# Patient Record
Sex: FEMALE | Race: BLACK OR AFRICAN AMERICAN | NOT HISPANIC OR LATINO | URBAN - METROPOLITAN AREA
[De-identification: names, ages, dates, MRNs, and addresses within clinical notes are randomized per-mention and may not be internally consistent; named-entity substitution may affect disease eponyms.]

---

## 2018-11-14 ENCOUNTER — EMERGENCY (EMERGENCY)
Facility: HOSPITAL | Age: 53
LOS: 1 days | Discharge: HOME | End: 2018-11-14
Attending: EMERGENCY MEDICINE
Payer: SELF-PAY

## 2018-11-14 VITALS
OXYGEN SATURATION: 100 % | TEMPERATURE: 98 F | SYSTOLIC BLOOD PRESSURE: 137 MMHG | RESPIRATION RATE: 17 BRPM | DIASTOLIC BLOOD PRESSURE: 83 MMHG | HEART RATE: 81 BPM

## 2018-11-14 VITALS
SYSTOLIC BLOOD PRESSURE: 128 MMHG | OXYGEN SATURATION: 100 % | RESPIRATION RATE: 20 BRPM | WEIGHT: 201.06 LBS | DIASTOLIC BLOOD PRESSURE: 78 MMHG | HEART RATE: 72 BPM | TEMPERATURE: 98 F

## 2018-11-14 PROCEDURE — 73610 X-RAY EXAM OF ANKLE: CPT | Mod: 26,RT

## 2018-11-14 PROCEDURE — 99283 EMERGENCY DEPT VISIT LOW MDM: CPT

## 2018-11-14 PROCEDURE — 73610 X-RAY EXAM OF ANKLE: CPT

## 2018-11-14 RX ORDER — IBUPROFEN 200 MG
600 TABLET ORAL ONCE
Qty: 0 | Refills: 0 | Status: COMPLETED | OUTPATIENT
Start: 2018-11-14 | End: 2018-11-14

## 2018-11-14 RX ADMIN — Medication 600 MILLIGRAM(S): at 17:17

## 2018-11-14 NOTE — ED PROVIDER NOTE - SKIN, MLM
Skin normal color for race, warm, dry and intact. No evidence of rash. Edema over right medial ankle

## 2018-11-14 NOTE — ED PROVIDER NOTE - CARE PLAN
Principal Discharge DX:	Ankle sprain  Assessment and plan of treatment:	You were seen in the Emergency Department for ankle pain. Your examination and xrays were reassuring. Use the brace and crutches as discussed. Please follow up with orthopedic surgery as soon as possible for further evaluation. Take motrin and tylenol as needed. Please return to the Emergency Department if you have any new concerning symptoms such as severe pain, weakness, or any other concerning symptoms. Principal Discharge DX:	Ankle sprain  Goal:	R lateral  Assessment and plan of treatment:	You were seen in the Emergency Department for ankle pain. Your examination and xrays were reassuring. Use the brace and crutches as discussed. Please follow up with orthopedic surgery as soon as possible for further evaluation. Take motrin and tylenol as needed. Please return to the Emergency Department if you have any new concerning symptoms such as severe pain, weakness, or any other concerning symptoms.

## 2018-11-14 NOTE — ED PROVIDER NOTE - PLAN OF CARE
You were seen in the Emergency Department for ankle pain. Your examination and xrays were reassuring. Use the brace and crutches as discussed. Please follow up with orthopedic surgery as soon as possible for further evaluation. Take motrin and tylenol as needed. Please return to the Emergency Department if you have any new concerning symptoms such as severe pain, weakness, or any other concerning symptoms. R lateral

## 2018-11-14 NOTE — ED ADULT NURSE NOTE - NSIMPLEMENTINTERV_GEN_ALL_ED
Implemented All Universal Safety Interventions:  [place] to call system. Call bell, personal items and telephone within reach. Instruct patient to call for assistance. Room bathroom lighting operational. Non-slip footwear when patient is off stretcher. Physically safe environment: no spills, clutter or unnecessary equipment. Stretcher in lowest position, wheels locked, appropriate side rails in place.
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
## 2018-11-14 NOTE — ED PROVIDER NOTE - MUSCULOSKELETAL MINIMAL EXAM
tenderness to palpation right posterior lateral malleolus, ATFL, no tenderness over base of 5th, midfoot, medial ankle
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
## 2020-06-16 ENCOUNTER — TRANSCRIPTION ENCOUNTER (OUTPATIENT)
Age: 55
End: 2020-06-16

## 2023-07-25 ENCOUNTER — INPATIENT (INPATIENT)
Facility: HOSPITAL | Age: 58
LOS: 9 days | Discharge: HOME | End: 2023-08-04
Attending: INTERNAL MEDICINE | Admitting: INTERNAL MEDICINE
Payer: COMMERCIAL

## 2023-07-25 VITALS
TEMPERATURE: 98 F | SYSTOLIC BLOOD PRESSURE: 103 MMHG | OXYGEN SATURATION: 98 % | RESPIRATION RATE: 18 BRPM | DIASTOLIC BLOOD PRESSURE: 66 MMHG | HEART RATE: 90 BPM

## 2023-07-25 LAB
ALBUMIN SERPL ELPH-MCNC: 5 G/DL — SIGNIFICANT CHANGE UP (ref 3.3–5)
ALP SERPL-CCNC: 88 U/L — SIGNIFICANT CHANGE UP (ref 40–120)
ALT FLD-CCNC: 12 U/L — SIGNIFICANT CHANGE UP (ref 4–33)
ANION GAP SERPL CALC-SCNC: 13 MMOL/L — SIGNIFICANT CHANGE UP (ref 7–14)
APPEARANCE UR: ABNORMAL
APTT BLD: 22.1 SEC — LOW (ref 24.5–35.6)
AST SERPL-CCNC: 14 U/L — SIGNIFICANT CHANGE UP (ref 4–32)
B PERT DNA SPEC QL NAA+PROBE: SIGNIFICANT CHANGE UP
B PERT+PARAPERT DNA PNL SPEC NAA+PROBE: SIGNIFICANT CHANGE UP
BACTERIA # UR AUTO: ABNORMAL /HPF
BASE EXCESS BLDV CALC-SCNC: 1.1 MMOL/L — SIGNIFICANT CHANGE UP (ref -2–3)
BASOPHILS # BLD AUTO: 0.03 K/UL — SIGNIFICANT CHANGE UP (ref 0–0.2)
BASOPHILS NFR BLD AUTO: 0.2 % — SIGNIFICANT CHANGE UP (ref 0–2)
BILIRUB SERPL-MCNC: 0.5 MG/DL — SIGNIFICANT CHANGE UP (ref 0.2–1.2)
BILIRUB UR-MCNC: ABNORMAL
BLOOD GAS VENOUS COMPREHENSIVE RESULT: SIGNIFICANT CHANGE UP
BORDETELLA PARAPERTUSSIS (RAPRVP): SIGNIFICANT CHANGE UP
BUN SERPL-MCNC: 20 MG/DL — SIGNIFICANT CHANGE UP (ref 7–23)
C PNEUM DNA SPEC QL NAA+PROBE: SIGNIFICANT CHANGE UP
CALCIUM SERPL-MCNC: 10.6 MG/DL — HIGH (ref 8.4–10.5)
CAST: 5 /LPF — SIGNIFICANT CHANGE UP (ref 0–4)
CHLORIDE BLDV-SCNC: 103 MMOL/L — SIGNIFICANT CHANGE UP (ref 96–108)
CHLORIDE SERPL-SCNC: 101 MMOL/L — SIGNIFICANT CHANGE UP (ref 98–107)
CO2 BLDV-SCNC: 29.1 MMOL/L — HIGH (ref 22–26)
CO2 SERPL-SCNC: 23 MMOL/L — SIGNIFICANT CHANGE UP (ref 22–31)
COD CRY URNS QL: PRESENT
COLOR SPEC: SIGNIFICANT CHANGE UP
CREAT SERPL-MCNC: 3.81 MG/DL — HIGH (ref 0.5–1.3)
DIFF PNL FLD: ABNORMAL
EGFR: 13 ML/MIN/1.73M2 — LOW
EOSINOPHIL # BLD AUTO: 0 K/UL — SIGNIFICANT CHANGE UP (ref 0–0.5)
EOSINOPHIL NFR BLD AUTO: 0 % — SIGNIFICANT CHANGE UP (ref 0–6)
FLUAV SUBTYP SPEC NAA+PROBE: SIGNIFICANT CHANGE UP
FLUBV RNA SPEC QL NAA+PROBE: SIGNIFICANT CHANGE UP
GAS PNL BLDV: 135 MMOL/L — LOW (ref 136–145)
GLUCOSE BLDV-MCNC: 180 MG/DL — HIGH (ref 70–99)
GLUCOSE SERPL-MCNC: 168 MG/DL — HIGH (ref 70–99)
GLUCOSE UR QL: NEGATIVE MG/DL — SIGNIFICANT CHANGE UP
HADV DNA SPEC QL NAA+PROBE: SIGNIFICANT CHANGE UP
HCO3 BLDV-SCNC: 28 MMOL/L — SIGNIFICANT CHANGE UP (ref 22–29)
HCOV 229E RNA SPEC QL NAA+PROBE: SIGNIFICANT CHANGE UP
HCOV HKU1 RNA SPEC QL NAA+PROBE: SIGNIFICANT CHANGE UP
HCOV NL63 RNA SPEC QL NAA+PROBE: SIGNIFICANT CHANGE UP
HCOV OC43 RNA SPEC QL NAA+PROBE: SIGNIFICANT CHANGE UP
HCT VFR BLD CALC: 44.8 % — SIGNIFICANT CHANGE UP (ref 34.5–45)
HCT VFR BLDA CALC: 44 % — SIGNIFICANT CHANGE UP (ref 34.5–46.5)
HGB BLD CALC-MCNC: 14.5 G/DL — SIGNIFICANT CHANGE UP (ref 11.7–16.1)
HGB BLD-MCNC: 14.1 G/DL — SIGNIFICANT CHANGE UP (ref 11.5–15.5)
HMPV RNA SPEC QL NAA+PROBE: SIGNIFICANT CHANGE UP
HPIV1 RNA SPEC QL NAA+PROBE: SIGNIFICANT CHANGE UP
HPIV2 RNA SPEC QL NAA+PROBE: SIGNIFICANT CHANGE UP
HPIV3 RNA SPEC QL NAA+PROBE: SIGNIFICANT CHANGE UP
HPIV4 RNA SPEC QL NAA+PROBE: SIGNIFICANT CHANGE UP
IANC: 10.65 K/UL — HIGH (ref 1.8–7.4)
IMM GRANULOCYTES NFR BLD AUTO: 2.7 % — HIGH (ref 0–0.9)
INR BLD: 1.1 RATIO — SIGNIFICANT CHANGE UP (ref 0.85–1.18)
KETONES UR-MCNC: 15 MG/DL
LACTATE BLDV-MCNC: 1.9 MMOL/L — SIGNIFICANT CHANGE UP (ref 0.5–2)
LEUKOCYTE ESTERASE UR-ACNC: ABNORMAL
LITHIUM SERPL-MCNC: 5.3 MMOL/L — CRITICAL HIGH (ref 0.6–1.2)
LYMPHOCYTES # BLD AUTO: 1.97 K/UL — SIGNIFICANT CHANGE UP (ref 1–3.3)
LYMPHOCYTES # BLD AUTO: 14 % — SIGNIFICANT CHANGE UP (ref 13–44)
M PNEUMO DNA SPEC QL NAA+PROBE: SIGNIFICANT CHANGE UP
MCHC RBC-ENTMCNC: 30.9 PG — SIGNIFICANT CHANGE UP (ref 27–34)
MCHC RBC-ENTMCNC: 31.5 GM/DL — LOW (ref 32–36)
MCV RBC AUTO: 98 FL — SIGNIFICANT CHANGE UP (ref 80–100)
MONOCYTES # BLD AUTO: 1 K/UL — HIGH (ref 0–0.9)
MONOCYTES NFR BLD AUTO: 7.1 % — SIGNIFICANT CHANGE UP (ref 2–14)
NEUTROPHILS # BLD AUTO: 10.65 K/UL — HIGH (ref 1.8–7.4)
NEUTROPHILS NFR BLD AUTO: 76 % — SIGNIFICANT CHANGE UP (ref 43–77)
NITRITE UR-MCNC: NEGATIVE — SIGNIFICANT CHANGE UP
NRBC # BLD: 0 /100 WBCS — SIGNIFICANT CHANGE UP (ref 0–0)
NRBC # FLD: 0 K/UL — SIGNIFICANT CHANGE UP (ref 0–0)
PCO2 BLDV: 50 MMHG — SIGNIFICANT CHANGE UP (ref 39–52)
PH BLDV: 7.35 — SIGNIFICANT CHANGE UP (ref 7.32–7.43)
PH UR: 5.5 — SIGNIFICANT CHANGE UP (ref 5–8)
PLATELET # BLD AUTO: 368 K/UL — SIGNIFICANT CHANGE UP (ref 150–400)
PO2 BLDV: 29 MMHG — SIGNIFICANT CHANGE UP (ref 25–45)
POTASSIUM BLDV-SCNC: 3.5 MMOL/L — SIGNIFICANT CHANGE UP (ref 3.5–5.1)
POTASSIUM SERPL-MCNC: 3.5 MMOL/L — SIGNIFICANT CHANGE UP (ref 3.5–5.3)
POTASSIUM SERPL-SCNC: 3.5 MMOL/L — SIGNIFICANT CHANGE UP (ref 3.5–5.3)
PROT SERPL-MCNC: 8.2 G/DL — SIGNIFICANT CHANGE UP (ref 6–8.3)
PROT UR-MCNC: 100 MG/DL
PROTHROM AB SERPL-ACNC: 12.3 SEC — SIGNIFICANT CHANGE UP (ref 9.5–13)
RAPID RVP RESULT: SIGNIFICANT CHANGE UP
RBC # BLD: 4.57 M/UL — SIGNIFICANT CHANGE UP (ref 3.8–5.2)
RBC # FLD: 14.7 % — HIGH (ref 10.3–14.5)
RBC CASTS # UR COMP ASSIST: 24 /HPF — HIGH (ref 0–4)
REVIEW: SIGNIFICANT CHANGE UP
RSV RNA SPEC QL NAA+PROBE: SIGNIFICANT CHANGE UP
RV+EV RNA SPEC QL NAA+PROBE: SIGNIFICANT CHANGE UP
SAO2 % BLDV: 55.3 % — LOW (ref 67–88)
SARS-COV-2 RNA SPEC QL NAA+PROBE: SIGNIFICANT CHANGE UP
SODIUM SERPL-SCNC: 137 MMOL/L — SIGNIFICANT CHANGE UP (ref 135–145)
SP GR SPEC: 1.03 — SIGNIFICANT CHANGE UP (ref 1–1.03)
SQUAMOUS # UR AUTO: 44 /HPF — HIGH (ref 0–5)
UROBILINOGEN FLD QL: 1 MG/DL — SIGNIFICANT CHANGE UP (ref 0.2–1)
WBC # BLD: 14.03 K/UL — HIGH (ref 3.8–10.5)
WBC # FLD AUTO: 14.03 K/UL — HIGH (ref 3.8–10.5)
WBC UR QL: 62 /HPF — HIGH (ref 0–5)

## 2023-07-25 PROCEDURE — 99285 EMERGENCY DEPT VISIT HI MDM: CPT

## 2023-07-25 PROCEDURE — 70450 CT HEAD/BRAIN W/O DYE: CPT | Mod: 26,MA

## 2023-07-25 PROCEDURE — 71045 X-RAY EXAM CHEST 1 VIEW: CPT | Mod: 26

## 2023-07-25 RX ORDER — SODIUM CHLORIDE 9 MG/ML
1000 INJECTION INTRAMUSCULAR; INTRAVENOUS; SUBCUTANEOUS ONCE
Refills: 0 | Status: COMPLETED | OUTPATIENT
Start: 2023-07-25 | End: 2023-07-25

## 2023-07-25 RX ADMIN — SODIUM CHLORIDE 1000 MILLILITER(S): 9 INJECTION INTRAMUSCULAR; INTRAVENOUS; SUBCUTANEOUS at 23:45

## 2023-07-25 RX ADMIN — SODIUM CHLORIDE 1000 MILLILITER(S): 9 INJECTION INTRAMUSCULAR; INTRAVENOUS; SUBCUTANEOUS at 23:56

## 2023-07-25 RX ADMIN — SODIUM CHLORIDE 1000 MILLILITER(S): 9 INJECTION INTRAMUSCULAR; INTRAVENOUS; SUBCUTANEOUS at 17:15

## 2023-07-25 NOTE — ED ADULT NURSE NOTE - CHIEF COMPLAINT QUOTE
Pt arrives in wheelchair to triage from [facility] outpatient office, sent by NP for N/V since Sunday. Pt appears lethargic in triage, minimally verbal. Pt hasn't been able to take meds since Sunday. PMHx: Bipolar disorder 1.

## 2023-07-25 NOTE — ED PROVIDER NOTE - PHYSICAL EXAMINATION
Physical Exam:  Gen: NAD, AO2  Head: NCAT  HEENT: EOMI, PEERLA, normal conjunctiva, tongue midline, oral mucosa moist  Lung: CTAB, no respiratory distress, no wheezes/rhonchi/rales B/L, speaking in full sentences  CV: RRR  Abd: soft, NT, ND, no guarding, no rigidity, no rebound tenderness, no CVA tenderness   MSK: no visible deformities, ROM normal in UE/LE, no back pain  Neuro: No focal sensory or motor deficits  Skin: Warm, well perfused, no rash, no leg swelling  Psych: normal affect, calm

## 2023-07-25 NOTE — ED PROVIDER NOTE - OBJECTIVE STATEMENT
Patient is a 58-year-old female with a past medical history of bipolar on lithium who presents emergency department for 1 week of lethargy and fatigue..  Per patient's daughter who is bedside, the patient has not been responsive or talking to her since earlier today.  Patient went to outpatient [facility] and was referred here to get evaluated.  Per patient's family member, the the patient has not taken her medication since Sunday due to nausea, vomiting.  Patient's family member states she has been nauseous and having multiple episodes of diarrhea for the last week.  Patient has not had a p.o. intake in over 24 hours.  Patient denies any other recent illnesses.

## 2023-07-25 NOTE — ED ADULT NURSE NOTE - OBJECTIVE STATEMENT
58 yr old female aox2 pmh bipolar 1, referred from [facility] clinic for evaluation of worsening lethargy. daughter present at bedside states pt has been under a lot of stress lately and began to enter a "manic" phase not being able to eat or sleep more than 30 mins at a time since sunday. when awake pt states she has been having N/V/D since yesterday. denies fever, chills, urinary sx, syncope, falls, sick contact/ travel. pt unable to stay awake for more than 2 mins at a time during assessment. Lungs clear, +BSX4 soft nontender, +radial pulse equal BL, no injuries noted, placed on cardiac monitor, labs sent, NS bolus infusing, in no acute distress

## 2023-07-25 NOTE — ED PROVIDER NOTE - CLINICAL SUMMARY MEDICAL DECISION MAKING FREE TEXT BOX
Patient presents emergency department complaining of lethargy and fatigue.  Patient is hemodynamically stable and afebrile on presentation.  However patient altered and able to.  This is a change from her baseline we will obtain infectious etiology work-up.  We will also obtain CT head to evaluate.  Pending labs and imaging for further disposition. Patient presents emergency department complaining of lethargy and fatigue.  Patient is hemodynamically stable and afebrile on presentation.  However patient altered and able to.  This is a change from her baseline we will obtain infectious etiology work-up.  We will also obtain CT head to evaluate.  Pending labs and imaging for further disposition.    saeed:Patient is a 58-year-old woman who is being assessed at [facility] outpatient was found to be lethargic so the patient was sent to the emergency department.  She has had nausea and vomiting the last 3 days..  Here patient is mildly lethargic.  As per the daughter she does not believe he has been able to take her medication over the last couple days.  She is supposed to take lithium.  Abdomen is soft no rebound no guarding patient is mildly lethargic vital signs are reasonable although a temp of 99.2    Labs reveal a white count of 14,000 a creatinine which is a new bump of 3.81 with a glucose of 168 calcium of 10.6 the creatinine is of a significant bump up from May 2023 when it was normal BNP is 366 her urine reveals moderate leuk esterase but negative nitrates with red cells of 62 and white cells of 60 to with 44 epithelial cells her RVP is negative chest x-ray does not reveal any pneumonia we await a lithium and the CT of her head

## 2023-07-25 NOTE — ED ADULT NURSE NOTE - NSFALLRISKINTERV_ED_ALL_ED
Assistance OOB with selected safe patient handling equipment if applicable/Assistance with ambulation/Communicate fall risk and risk factors to all staff, patient, and family/Monitor gait and stability/Monitor for mental status changes and reorient to person, place, and time, as needed/Move patient closer to nursing station/within visual sight of ED staff/Provide visual cue: yellow wristband, yellow gown, etc/Reinforce activity limits and safety measures with patient and family/Toileting schedule using arm’s reach rule for commode and bathroom/Use of alarms - bed, stretcher, chair and/or video monitoring/Call bell, personal items and telephone in reach/Instruct patient to call for assistance before getting out of bed/chair/stretcher/Non-slip footwear applied when patient is off stretcher/[place] to call system/Physically safe environment - no spills, clutter or unnecessary equipment/Purposeful Proactive Rounding/Room/bathroom lighting operational, light cord in reach

## 2023-07-25 NOTE — ED PROVIDER NOTE - PROGRESS NOTE DETAILS
Drew Lynn MD (PGY3): Lithium level 5, creatinine elevated, baseline ~1. c/f acute on chronic lihtium tox. will give extra 2L NS bolus. will place sierra. tox paged. Drew Lynn MD (PGY3): tox and nephrology conulted. will set up for shiley placement. micu to be consulted. Drew Lynn MD (PGY3): micu conulted, aware Drew Lynn MD (PGY3): patient admitted to MICU.

## 2023-07-25 NOTE — ED PROVIDER NOTE - ATTENDING CONTRIBUTION TO CARE
saeed:Patient is a 58-year-old woman who is being assessed at [facility] outpatient was found to be lethargic so the patient was sent to the emergency department.  She has had nausea and vomiting the last 3 days..  Here patient is mildly lethargic.  As per the daughter she does not believe he has been able to take her medication over the last couple days.  She is supposed to take lithium.  Abdomen is soft no rebound no guarding patient is mildly lethargic vital signs are reasonable although a temp of 99.2    Labs reveal a white count of 14,000 a creatinine which is a new bump of 3.81 with a glucose of 168 calcium of 10.6 the creatinine is of a significant bump up from May 2023 when it was normal BNP is 366 her urine reveals moderate leuk esterase but negative nitrates with red cells of 62 and white cells of 60 to with 44 epithelial cells her RVP is negative chest x-ray does not reveal any pneumonia we await a lithium and the CT of her head    I performed a history and physical exam of the patient and discussed their management with the resident and /or advanced care provider. I reviewed the resident and /or ACP's note and agree with the documented findings and plan of care. My medical decison making and observations are found above.

## 2023-07-25 NOTE — ED ADULT NURSE REASSESSMENT NOTE - NS ED NURSE REASSESS COMMENT FT1
Pt admitted to MICU. MD Jean-Baptiste at pt bedside to place shiley for HD. Pt A&Ox2, alert but minimal responses. Pt able to follow some commands at this time, repeats name, unsure of location, lifts upper extremities. Pt b/l upper extremities sensory/motor intact, complete ROM. Right 20g placed, additional labs collected and sent. Pt pending transfer to ICU. Safety maintained.

## 2023-07-25 NOTE — ED ADULT NURSE REASSESSMENT NOTE - NS ED NURSE REASSESS COMMENT FT1
Received report from kareem Manrique. Pt A&Ox4, ambulatory at baseline, on room air coming to ER for lip laceration. Pt upper lip swollen, painful. Airway patent, pt speaking in full sentences, no drooling, no s/s of respiratory distress. Pt currently denies chest pain, HA, SOB, dizziness, vision changes. NSR on cardiac monitor. Respirations even and unlabored, chest rise and fall equal b/l. Abdomen soft and nontender. Right 20g patent. Pt pending CT. Safety maintained. Received report from kareem Manrique. Pt A&Ox4, ambulatory at baseline, on room air coming to ER for N/V/D, poor PO intake. Pt history of bipolar. Pt not responding to questions. Pt arouses when spoken to. Pt  with no s/s of distress, respiratory distress, pain. NSR on cardiac monitor. Respirations even and unlabored, chest rise and fall equal b/l. Abdomen soft and nontender. Right 20g patent. Pt pending CT. Safety maintained.

## 2023-07-25 NOTE — ED ADULT TRIAGE NOTE - CHIEF COMPLAINT QUOTE
Pt arrives in wheelchair to triage from [facility] outpatient office, sent by NP for N/V since Sunday. Pt appears lethargic in triage, minimally verbal. Pt hasn't been able to take meds since Sunday. PMHx: Bipolar disorder 1.

## 2023-07-26 DIAGNOSIS — T56.891A TOXIC EFFECT OF OTHER METALS, ACCIDENTAL (UNINTENTIONAL), INITIAL ENCOUNTER: ICD-10-CM

## 2023-07-26 DIAGNOSIS — N17.9 ACUTE KIDNEY FAILURE, UNSPECIFIED: ICD-10-CM

## 2023-07-26 LAB
AMMONIA BLD-MCNC: 23 UMOL/L — SIGNIFICANT CHANGE UP (ref 11–55)
AMPHET UR-MCNC: NEGATIVE — SIGNIFICANT CHANGE UP
ANION GAP SERPL CALC-SCNC: 10 MMOL/L — SIGNIFICANT CHANGE UP (ref 7–14)
ANION GAP SERPL CALC-SCNC: 11 MMOL/L — SIGNIFICANT CHANGE UP (ref 7–14)
ANION GAP SERPL CALC-SCNC: 11 MMOL/L — SIGNIFICANT CHANGE UP (ref 7–14)
ANION GAP SERPL CALC-SCNC: 12 MMOL/L — SIGNIFICANT CHANGE UP (ref 7–14)
APAP SERPL-MCNC: <10 UG/ML — LOW (ref 15–25)
APPEARANCE UR: ABNORMAL
BACTERIA # UR AUTO: ABNORMAL /HPF
BARBITURATES UR SCN-MCNC: NEGATIVE — SIGNIFICANT CHANGE UP
BENZODIAZ UR-MCNC: NEGATIVE — SIGNIFICANT CHANGE UP
BILIRUB UR-MCNC: NEGATIVE — SIGNIFICANT CHANGE UP
BUN SERPL-MCNC: 17 MG/DL — SIGNIFICANT CHANGE UP (ref 7–23)
BUN SERPL-MCNC: 18 MG/DL — SIGNIFICANT CHANGE UP (ref 7–23)
BUN SERPL-MCNC: 22 MG/DL — SIGNIFICANT CHANGE UP (ref 7–23)
BUN SERPL-MCNC: 25 MG/DL — HIGH (ref 7–23)
CALCIUM SERPL-MCNC: 8.3 MG/DL — LOW (ref 8.4–10.5)
CALCIUM SERPL-MCNC: 8.4 MG/DL — SIGNIFICANT CHANGE UP (ref 8.4–10.5)
CALCIUM SERPL-MCNC: 8.6 MG/DL — SIGNIFICANT CHANGE UP (ref 8.4–10.5)
CALCIUM SERPL-MCNC: 9 MG/DL — SIGNIFICANT CHANGE UP (ref 8.4–10.5)
CAST: 178 /LPF — HIGH (ref 0–4)
CHLORIDE SERPL-SCNC: 105 MMOL/L — SIGNIFICANT CHANGE UP (ref 98–107)
CHLORIDE SERPL-SCNC: 107 MMOL/L — SIGNIFICANT CHANGE UP (ref 98–107)
CHLORIDE SERPL-SCNC: 107 MMOL/L — SIGNIFICANT CHANGE UP (ref 98–107)
CHLORIDE SERPL-SCNC: 108 MMOL/L — HIGH (ref 98–107)
CO2 SERPL-SCNC: 19 MMOL/L — LOW (ref 22–31)
CO2 SERPL-SCNC: 23 MMOL/L — SIGNIFICANT CHANGE UP (ref 22–31)
CO2 SERPL-SCNC: 23 MMOL/L — SIGNIFICANT CHANGE UP (ref 22–31)
CO2 SERPL-SCNC: 24 MMOL/L — SIGNIFICANT CHANGE UP (ref 22–31)
COCAINE METAB.OTHER UR-MCNC: NEGATIVE — SIGNIFICANT CHANGE UP
COLOR SPEC: SIGNIFICANT CHANGE UP
CREAT SERPL-MCNC: 2.09 MG/DL — HIGH (ref 0.5–1.3)
CREAT SERPL-MCNC: 2.16 MG/DL — HIGH (ref 0.5–1.3)
CREAT SERPL-MCNC: 2.19 MG/DL — HIGH (ref 0.5–1.3)
CREAT SERPL-MCNC: 3.6 MG/DL — HIGH (ref 0.5–1.3)
CREATININE URINE RESULT, DAU: 235 MG/DL — SIGNIFICANT CHANGE UP
DIALYSIS INSTRUMENT RESULT - HEPATITIS B SURFACE ANTIGEN: NEGATIVE — SIGNIFICANT CHANGE UP
DIFF PNL FLD: ABNORMAL
EGFR: 14 ML/MIN/1.73M2 — LOW
EGFR: 25 ML/MIN/1.73M2 — LOW
EGFR: 26 ML/MIN/1.73M2 — LOW
EGFR: 27 ML/MIN/1.73M2 — LOW
GLUCOSE SERPL-MCNC: 102 MG/DL — HIGH (ref 70–99)
GLUCOSE SERPL-MCNC: 113 MG/DL — HIGH (ref 70–99)
GLUCOSE SERPL-MCNC: 114 MG/DL — HIGH (ref 70–99)
GLUCOSE SERPL-MCNC: 116 MG/DL — HIGH (ref 70–99)
GLUCOSE UR QL: NEGATIVE MG/DL — SIGNIFICANT CHANGE UP
HBV CORE AB SER-ACNC: SIGNIFICANT CHANGE UP
HBV CORE IGM SER-ACNC: SIGNIFICANT CHANGE UP
HBV SURFACE AB SER-ACNC: 17.2 MIU/ML — SIGNIFICANT CHANGE UP
HBV SURFACE AG SER-ACNC: SIGNIFICANT CHANGE UP
HYALINE CASTS # UR AUTO: PRESENT
KETONES UR-MCNC: 15 MG/DL
LEUKOCYTE ESTERASE UR-ACNC: ABNORMAL
LITHIUM SERPL-MCNC: 3.2 MMOL/L — CRITICAL HIGH (ref 0.6–1.2)
LITHIUM SERPL-MCNC: 3.5 MMOL/L — CRITICAL HIGH (ref 0.6–1.2)
LITHIUM SERPL-MCNC: 3.5 MMOL/L — CRITICAL HIGH (ref 0.6–1.2)
LITHIUM SERPL-MCNC: 5.2 MMOL/L — CRITICAL HIGH (ref 0.6–1.2)
MAGNESIUM SERPL-MCNC: 1.8 MG/DL — SIGNIFICANT CHANGE UP (ref 1.6–2.6)
MAGNESIUM SERPL-MCNC: 1.8 MG/DL — SIGNIFICANT CHANGE UP (ref 1.6–2.6)
MAGNESIUM SERPL-MCNC: 2.5 MG/DL — SIGNIFICANT CHANGE UP (ref 1.6–2.6)
MAGNESIUM SERPL-MCNC: 3.1 MG/DL — HIGH (ref 1.6–2.6)
METHADONE UR-MCNC: NEGATIVE — SIGNIFICANT CHANGE UP
MRSA PCR RESULT.: SIGNIFICANT CHANGE UP
NITRITE UR-MCNC: NEGATIVE — SIGNIFICANT CHANGE UP
OPIATES UR-MCNC: NEGATIVE — SIGNIFICANT CHANGE UP
OXYCODONE UR-MCNC: NEGATIVE — SIGNIFICANT CHANGE UP
PCP SPEC-MCNC: SIGNIFICANT CHANGE UP
PCP UR-MCNC: NEGATIVE — SIGNIFICANT CHANGE UP
PH UR: 5 — SIGNIFICANT CHANGE UP (ref 5–8)
PHOSPHATE SERPL-MCNC: 1.4 MG/DL — LOW (ref 2.5–4.5)
PHOSPHATE SERPL-MCNC: 1.8 MG/DL — LOW (ref 2.5–4.5)
PHOSPHATE SERPL-MCNC: 2 MG/DL — LOW (ref 2.5–4.5)
PHOSPHATE SERPL-MCNC: 2.9 MG/DL — SIGNIFICANT CHANGE UP (ref 2.5–4.5)
POTASSIUM SERPL-MCNC: 2.9 MMOL/L — CRITICAL LOW (ref 3.5–5.3)
POTASSIUM SERPL-MCNC: 2.9 MMOL/L — CRITICAL LOW (ref 3.5–5.3)
POTASSIUM SERPL-MCNC: 3.2 MMOL/L — LOW (ref 3.5–5.3)
POTASSIUM SERPL-MCNC: 4 MMOL/L — SIGNIFICANT CHANGE UP (ref 3.5–5.3)
POTASSIUM SERPL-SCNC: 2.9 MMOL/L — CRITICAL LOW (ref 3.5–5.3)
POTASSIUM SERPL-SCNC: 2.9 MMOL/L — CRITICAL LOW (ref 3.5–5.3)
POTASSIUM SERPL-SCNC: 3.2 MMOL/L — LOW (ref 3.5–5.3)
POTASSIUM SERPL-SCNC: 4 MMOL/L — SIGNIFICANT CHANGE UP (ref 3.5–5.3)
PROT UR-MCNC: 30 MG/DL
RBC CASTS # UR COMP ASSIST: 8 /HPF — HIGH (ref 0–4)
REVIEW: SIGNIFICANT CHANGE UP
S AUREUS DNA NOSE QL NAA+PROBE: SIGNIFICANT CHANGE UP
SALICYLATES SERPL-MCNC: <0.3 MG/DL — LOW (ref 15–30)
SODIUM SERPL-SCNC: 137 MMOL/L — SIGNIFICANT CHANGE UP (ref 135–145)
SODIUM SERPL-SCNC: 140 MMOL/L — SIGNIFICANT CHANGE UP (ref 135–145)
SODIUM SERPL-SCNC: 141 MMOL/L — SIGNIFICANT CHANGE UP (ref 135–145)
SODIUM SERPL-SCNC: 142 MMOL/L — SIGNIFICANT CHANGE UP (ref 135–145)
SP GR SPEC: 1.02 — SIGNIFICANT CHANGE UP (ref 1–1.03)
SQUAMOUS # UR AUTO: 22 /HPF — HIGH (ref 0–5)
THC UR QL: NEGATIVE — SIGNIFICANT CHANGE UP
UROBILINOGEN FLD QL: 1 MG/DL — SIGNIFICANT CHANGE UP (ref 0.2–1)
WBC UR QL: 89 /HPF — HIGH (ref 0–5)

## 2023-07-26 PROCEDURE — 99254 IP/OBS CNSLTJ NEW/EST MOD 60: CPT | Mod: GC

## 2023-07-26 PROCEDURE — 76770 US EXAM ABDO BACK WALL COMP: CPT | Mod: 26

## 2023-07-26 PROCEDURE — 76604 US EXAM CHEST: CPT | Mod: 26,GC

## 2023-07-26 PROCEDURE — 99291 CRITICAL CARE FIRST HOUR: CPT | Mod: GC

## 2023-07-26 PROCEDURE — 99255 IP/OBS CONSLTJ NEW/EST HI 80: CPT

## 2023-07-26 PROCEDURE — 99292 CRITICAL CARE ADDL 30 MIN: CPT | Mod: GC

## 2023-07-26 PROCEDURE — 93308 TTE F-UP OR LMTD: CPT | Mod: 26,GC

## 2023-07-26 PROCEDURE — 71045 X-RAY EXAM CHEST 1 VIEW: CPT | Mod: 26

## 2023-07-26 RX ORDER — HALOPERIDOL DECANOATE 100 MG/ML
5 INJECTION INTRAMUSCULAR EVERY 6 HOURS
Refills: 0 | Status: DISCONTINUED | OUTPATIENT
Start: 2023-07-26 | End: 2023-07-26

## 2023-07-26 RX ORDER — POTASSIUM PHOSPHATE, MONOBASIC POTASSIUM PHOSPHATE, DIBASIC 236; 224 MG/ML; MG/ML
15 INJECTION, SOLUTION INTRAVENOUS ONCE
Refills: 0 | Status: COMPLETED | OUTPATIENT
Start: 2023-07-26 | End: 2023-07-26

## 2023-07-26 RX ORDER — HEPARIN SODIUM 5000 [USP'U]/ML
5000 INJECTION INTRAVENOUS; SUBCUTANEOUS EVERY 12 HOURS
Refills: 0 | Status: DISCONTINUED | OUTPATIENT
Start: 2023-07-26 | End: 2023-07-26

## 2023-07-26 RX ORDER — HALOPERIDOL DECANOATE 100 MG/ML
2.5 INJECTION INTRAMUSCULAR EVERY 6 HOURS
Refills: 0 | Status: DISCONTINUED | OUTPATIENT
Start: 2023-07-26 | End: 2023-07-28

## 2023-07-26 RX ORDER — SODIUM CHLORIDE 9 MG/ML
1000 INJECTION, SOLUTION INTRAVENOUS ONCE
Refills: 0 | Status: COMPLETED | OUTPATIENT
Start: 2023-07-26 | End: 2023-07-26

## 2023-07-26 RX ORDER — CEFTRIAXONE 500 MG/1
1000 INJECTION, POWDER, FOR SOLUTION INTRAMUSCULAR; INTRAVENOUS EVERY 24 HOURS
Refills: 0 | Status: COMPLETED | OUTPATIENT
Start: 2023-07-26 | End: 2023-07-28

## 2023-07-26 RX ORDER — HEPARIN SODIUM 5000 [USP'U]/ML
5000 INJECTION INTRAVENOUS; SUBCUTANEOUS EVERY 8 HOURS
Refills: 0 | Status: DISCONTINUED | OUTPATIENT
Start: 2023-07-26 | End: 2023-08-04

## 2023-07-26 RX ORDER — CHLORHEXIDINE GLUCONATE 213 G/1000ML
1 SOLUTION TOPICAL
Refills: 0 | Status: DISCONTINUED | OUTPATIENT
Start: 2023-07-26 | End: 2023-07-26

## 2023-07-26 RX ORDER — TRAZODONE HCL 50 MG
0 TABLET ORAL
Qty: 0 | Refills: 0 | DISCHARGE

## 2023-07-26 RX ORDER — NOREPINEPHRINE BITARTRATE/D5W 8 MG/250ML
0.05 PLASTIC BAG, INJECTION (ML) INTRAVENOUS
Qty: 8 | Refills: 0 | Status: DISCONTINUED | OUTPATIENT
Start: 2023-07-26 | End: 2023-07-27

## 2023-07-26 RX ORDER — HALOPERIDOL DECANOATE 100 MG/ML
2.5 INJECTION INTRAMUSCULAR ONCE
Refills: 0 | Status: COMPLETED | OUTPATIENT
Start: 2023-07-26 | End: 2023-07-26

## 2023-07-26 RX ORDER — CHLORHEXIDINE GLUCONATE 213 G/1000ML
1 SOLUTION TOPICAL DAILY
Refills: 0 | Status: DISCONTINUED | OUTPATIENT
Start: 2023-07-26 | End: 2023-08-04

## 2023-07-26 RX ORDER — HALOPERIDOL DECANOATE 100 MG/ML
5 INJECTION INTRAMUSCULAR ONCE
Refills: 0 | Status: DISCONTINUED | OUTPATIENT
Start: 2023-07-26 | End: 2023-07-26

## 2023-07-26 RX ORDER — POTASSIUM CHLORIDE 20 MEQ
10 PACKET (EA) ORAL
Refills: 0 | Status: COMPLETED | OUTPATIENT
Start: 2023-07-26 | End: 2023-07-26

## 2023-07-26 RX ORDER — MAGNESIUM SULFATE 500 MG/ML
2 VIAL (ML) INJECTION ONCE
Refills: 0 | Status: COMPLETED | OUTPATIENT
Start: 2023-07-26 | End: 2023-07-26

## 2023-07-26 RX ORDER — DEXMEDETOMIDINE HYDROCHLORIDE IN 0.9% SODIUM CHLORIDE 4 UG/ML
0.2 INJECTION INTRAVENOUS
Qty: 400 | Refills: 0 | Status: DISCONTINUED | OUTPATIENT
Start: 2023-07-26 | End: 2023-07-27

## 2023-07-26 RX ORDER — SODIUM CHLORIDE 9 MG/ML
1000 INJECTION INTRAMUSCULAR; INTRAVENOUS; SUBCUTANEOUS
Refills: 0 | Status: DISCONTINUED | OUTPATIENT
Start: 2023-07-26 | End: 2023-07-28

## 2023-07-26 RX ADMIN — CHLORHEXIDINE GLUCONATE 1 APPLICATION(S): 213 SOLUTION TOPICAL at 12:33

## 2023-07-26 RX ADMIN — SODIUM CHLORIDE 1000 MILLILITER(S): 9 INJECTION, SOLUTION INTRAVENOUS at 19:30

## 2023-07-26 RX ADMIN — HEPARIN SODIUM 5000 UNIT(S): 5000 INJECTION INTRAVENOUS; SUBCUTANEOUS at 21:35

## 2023-07-26 RX ADMIN — HEPARIN SODIUM 5000 UNIT(S): 5000 INJECTION INTRAVENOUS; SUBCUTANEOUS at 06:34

## 2023-07-26 RX ADMIN — POTASSIUM PHOSPHATE, MONOBASIC POTASSIUM PHOSPHATE, DIBASIC 62.5 MILLIMOLE(S): 236; 224 INJECTION, SOLUTION INTRAVENOUS at 14:43

## 2023-07-26 RX ADMIN — SODIUM CHLORIDE 100 MILLILITER(S): 9 INJECTION INTRAMUSCULAR; INTRAVENOUS; SUBCUTANEOUS at 09:32

## 2023-07-26 RX ADMIN — DEXMEDETOMIDINE HYDROCHLORIDE IN 0.9% SODIUM CHLORIDE 3.87 MICROGRAM(S)/KG/HR: 4 INJECTION INTRAVENOUS at 20:21

## 2023-07-26 RX ADMIN — HEPARIN SODIUM 5000 UNIT(S): 5000 INJECTION INTRAVENOUS; SUBCUTANEOUS at 14:13

## 2023-07-26 RX ADMIN — Medication 25 GRAM(S): at 10:53

## 2023-07-26 RX ADMIN — CEFTRIAXONE 100 MILLIGRAM(S): 500 INJECTION, POWDER, FOR SOLUTION INTRAMUSCULAR; INTRAVENOUS at 06:33

## 2023-07-26 RX ADMIN — SODIUM CHLORIDE 1000 MILLILITER(S): 9 INJECTION, SOLUTION INTRAVENOUS at 18:45

## 2023-07-26 RX ADMIN — Medication 7.25 MICROGRAM(S)/KG/MIN: at 20:21

## 2023-07-26 RX ADMIN — Medication 100 MILLIEQUIVALENT(S): at 12:35

## 2023-07-26 NOTE — CONSULT NOTE ADULT - ATTENDING COMMENTS
ALICJA  Lithium toxicity  AMS    Pt with AMS in the setting of high supratherapeutic lithium levels, again elevated on repeat  Will plan for another dialysis today with larger dialyzer and UF  Pt with less urine output now, will cont to monitor labs and Is/Os closely

## 2023-07-26 NOTE — H&P ADULT - HISTORY OF PRESENT ILLNESS
Dede Flores is a 58-year-old female with a past medical history of bipolar on lithium who presents emergency department on 7/25 for 1 week of lethargy and fatigue..  Per patient's daughter who is bedside, the patient has not been responsive or talking to her since earlier in the day. Patient went to outpatient [facility] and was referred to [facility] for evaluation. Per patient's family member, the the patient has not taken her medications since two days prior to admission due to nausea, vomiting.  Patient's family member states she has been nauseous and having multiple episodes of diarrhea for the last week.  Patient has not had a p.o. intake in over 24 hours.  Patient denies any other recent illnesses.  Blood work at ED significant for Lithium level 5.3 (0.6-1.2 normal), Cr. 3.81, WBC 14, blood glucose 168 UA showing leukocytes, LE, Nitrites negative, Bacteria, Ketones, Calcium Oxalate Crystals.  CT head and CXR WNL.   Admit to MICU for urgent hemodialysis

## 2023-07-26 NOTE — PROVIDER CONTACT NOTE (OTHER) - BACKGROUND
cont..- subsequently rinsed back once blood pressure hit 79/40 with multiple fluid boluses given prior to termination of dialysis treatment. See provider handoff.

## 2023-07-26 NOTE — CONSULT NOTE ADULT - ASSESSMENT
Assessment	  Concern for Lithium toxicity    Toxicologic Context: Lithium is a monovalent cation used as a mood stabilizer. Toxicity can be from acute ingestion, acute-on-chronic, or chronic. Lithium is mainly a neurotoxin. Mild-moderate symptoms include lethargy, weakness, slurred speech, incoordination, tremors, myoclonic jerks, rigidity, and/or extrapyramidal effects. Severe symptoms include agitated delirium, coma, seizures, and hyperthermia Recovery may be slow, and patients may develop delayed neurological sequale of persistent cerebellar and cognitive dysfunction (sometimes referred to as SILENT or syndrome of irreversible lithium-effectuated neurotoxicity). Sometimes, dysrhythmias and leukocytosis may be seen. Chronic toxicity may cause nephrogenic diabetes insipidus, hyperparaythyroidism and/or hypothyroidism/hyperthyroidism.    The patient is presenting with significant AMS, nausea, vomiting, speech latency, ataxia, and myoclonus with elevated creatinine and a lithium level >5, definitively a candidate for dialysis to expedite removal of Lithium      Recommendations:  Treatment:   1)  Fluid rehydration with NS, no acute ingestion per discussion with patient and daughter, likely in setting of dehydration precipitating lithium toxicity, patient clinically dry on exam, complete rehydration  2)  Urgent nephrology consultation for dialysis, Shiley placement per primary team for dialysis  3)  ECG monitoring in setting of QTC prolongation, improve with fluid rehydration if continued or worsening give magnesium and treat with dialysis  4)  Further primary assessment for precipitating factors    All plans discussed with primary managing team.    Thank you for the consultation. Please reach out via Teams with any questions.  Estiven Rivera MD, Medical Toxicology Fellow   Assessment	  Concern for Lithium toxicity    Toxicologic Context: Lithium is a monovalent cation used as a mood stabilizer. Toxicity can be from acute ingestion, acute-on-chronic, or chronic. Lithium is mainly a neurotoxin. Mild-moderate symptoms include lethargy, weakness, slurred speech, incoordination, tremors, myoclonic jerks, rigidity, and/or extrapyramidal effects. Severe symptoms include agitated delirium, coma, seizures, and hyperthermia Recovery may be slow, and patients may develop delayed neurological sequale of persistent cerebellar and cognitive dysfunction (sometimes referred to as SILENT or syndrome of irreversible lithium-effectuated neurotoxicity). Sometimes, dysrhythmias and leukocytosis may be seen. Chronic toxicity may cause nephrogenic diabetes insipidus, hyperparaythyroidism and/or hypothyroidism/hyperthyroidism.    The patient is presenting with significant AMS, nausea, vomiting, speech latency, ataxia, and myoclonus with elevated creatinine and a lithium level >5, definitively a candidate for dialysis to expedite removal of Lithium      Recommendations:  Treatment:   1)  Fluid rehydration with NS, no acute ingestion per discussion with patient and daughter, likely in setting of dehydration precipitating lithium toxicity, patient clinically dry on exam, complete rehydration  2)  Urgent nephrology consultation for dialysis, Shiley placement per primary team for dialysis  3)  ECG monitoring in setting of QTC prolongation, improve with fluid rehydration if continued or worsening give magnesium and treat with dialysis  4)  Further primary assessment for precipitating factors    All plans discussed with primary managing team.    Thank you for the consultation. Please reach out via Teams with any questions.  Estiven Rivera MD, Medical Toxicology Fellow    Attending Marissa:  58-year-old female history of bipolar disorder on lithium, presents to the emergency department with 1 week of fatigue and lethargy.  Patient has had decreased responsiveness over the past day.  She has not taken any medications anything p.o. in the past 24 hours.  She has been having multiple episodes of nausea and diarrhea.  There is no known report of overdose on lithium.  Patient's vitals have been stable.  As per report patient is alert and oriented x3.  No focal deficits.  Follow determining if having any findings consistent such as nystagmus, dysmetria dysarthria, ataxia.  Patient found to have elevated creatinine of 3.  81 which is up from baseline of 1.2 on prior.  She was also found to have a lithium level of 5.3.  CT head was negative.  Given the degree of altered mental status, elevated creatinine and elevated lithium level this patient can be consistent with lithium toxicity and would be considered a severe case.   Would recommend urgent nephrology consult as well as placing a dialysis catheter and dialysis.   Needs acetaminophen and salicylate level  Monitor for seizures and worsening encephalopathy   Assessment	  Concern for Lithium toxicity    Toxicologic Context: Lithium is a monovalent cation used as a mood stabilizer. Toxicity can be from acute ingestion, acute-on-chronic, or chronic. Lithium is mainly a neurotoxin. Mild-moderate symptoms include lethargy, weakness, slurred speech, incoordination, tremors, myoclonic jerks, rigidity, and/or extrapyramidal effects. Severe symptoms include agitated delirium, coma, seizures, and hyperthermia Recovery may be slow, and patients may develop delayed neurological sequale of persistent cerebellar and cognitive dysfunction (sometimes referred to as SILENT or syndrome of irreversible lithium-effectuated neurotoxicity). Sometimes, dysrhythmias and leukocytosis may be seen. Chronic toxicity may cause nephrogenic diabetes insipidus, hyperparaythyroidism and/or hypothyroidism/hyperthyroidism.    The patient is presenting with significant AMS, nausea, vomiting, speech latency, ataxia, and myoclonus with elevated creatinine and a lithium level >5, definitively a candidate for dialysis to expedite removal of Lithium      Recommendations:  Treatment:   1)  Fluid rehydration with NS, no acute ingestion per discussion with patient and daughter, likely in setting of dehydration precipitating lithium toxicity, patient clinically dry on exam, complete rehydration  2)  Urgent nephrology consultation for Hemodialysis, Shiley placement per primary team for dialysis  3)  ECG monitoring in setting of QTC prolongation, improve with fluid rehydration if continued or worsening give magnesium and treat with dialysis  4)  Further primary assessment for precipitating factors  5) Symptomatic management for AMS, airway, seizures.    All plans discussed with primary managing team.    Thank you for the consultation. Please reach out via Teams with any questions.  Estiven Rivera MD, Medical Toxicology Fellow    Attending Marissa:  58-year-old female history of bipolar disorder on lithium, presents to the emergency department with 1 week of fatigue and lethargy.  Patient has had decreased responsiveness over the past day.  She has not taken any medications anything p.o. in the past 24 hours.  She has been having multiple episodes of nausea and diarrhea.  There is no known report of overdose on lithium.  Patient's vitals have been stable.  As per report patient is alert and oriented x3.  No focal deficits.  Follow determining if having any findings consistent such as nystagmus, dysmetria dysarthria, ataxia.  Patient found to have elevated creatinine of 3.  81 which is up from baseline of 1.2 on prior.  She was also found to have a lithium level of 5.3.  CT head was negative.  Given the degree of altered mental status, elevated creatinine and elevated lithium level this patient can be consistent with lithium toxicity and would be considered a severe case.   Would recommend urgent nephrology consult as well as placing a dialysis catheter and dialysis.   Needs acetaminophen and salicylate level  Monitor for seizures and worsening encephalopathy   Assessment	  Concern for Lithium toxicity    Toxicologic Context: Lithium is a monovalent cation used as a mood stabilizer. Toxicity can be from acute ingestion, acute-on-chronic, or chronic. Lithium is mainly a neurotoxin. Mild-moderate symptoms include lethargy, weakness, slurred speech, incoordination, tremors, myoclonic jerks, rigidity, and/or extrapyramidal effects. Severe symptoms include agitated delirium, coma, seizures, and hyperthermia Recovery may be slow, and patients may develop delayed neurological sequale of persistent cerebellar and cognitive dysfunction (sometimes referred to as SILENT or syndrome of irreversible lithium-effectuated neurotoxicity). Sometimes, dysrhythmias and leukocytosis may be seen. Chronic toxicity may cause nephrogenic diabetes insipidus, hyperparaythyroidism and/or hypothyroidism/hyperthyroidism.    The patient is presenting with significant AMS, nausea, vomiting, speech latency, ataxia, and myoclonus with elevated creatinine and a lithium level >5, definitively a candidate for dialysis to expedite removal of Lithium      Recommendations:  Treatment:   1)  Fluid rehydration with NS, no acute ingestion per discussion with patient and daughter, likely in setting of dehydration precipitating lithium toxicity, patient clinically dry on exam, complete rehydration  2)  Urgent nephrology consultation for Hemodialysis, Shiley placement per primary team for dialysis  3)  ECG monitoring in setting of QTC prolongation, improve with fluid rehydration if continued or worsening give magnesium and treat with dialysis  4)  Further primary assessment for precipitating factors  5) Symptomatic management for AMS, airway, seizures.    All plans discussed with primary managing team.    Thank you for the consultation. Please reach out via Teams with any questions.  Estiven Rivera MD, Medical Toxicology Fellow    Attending Marissa:  58-year-old female history of bipolar disorder on lithium, presents to the emergency department with 1 week of fatigue and lethargy.  Patient has had decreased responsiveness over the past day.  She has not taken any medications anything p.o. in the past 24 hours.  She has been having multiple episodes of nausea and diarrhea.  There is no known report of overdose on lithium.  Patient's vitals have been stable.  As per report patient is alert and oriented x3.  No focal deficits.  On fellow evaluation pt with truncal ataxia, speech latency, nystagmus, tremors.  Patient found to have elevated creatinine of 3.  81 which is up from baseline of 1.2 on prior.  She was also found to have a lithium level of 5.3.  CT head was negative.  Given the degree of altered mental status, elevated creatinine and elevated lithium level this patient can be consistent with lithium toxicity and would be considered a moderate case.   Would recommend urgent nephrology consult as well as placing a dialysis catheter and dialysis.   Fluid hydration  Needs acetaminophen and salicylate level  Monitor for seizures and worsening encephalopathy

## 2023-07-26 NOTE — BH CONSULTATION LIAISON ASSESSMENT NOTE - RISK ASSESSMENT
Pt has the following risk factors for suicide: hx of bipolar d/o and medication non-compliance. Acute risk factors include: insomnia. Protective factors include ability to care for self, no psychosis/depression/montserrat/anxiety, no suicide attempt hx, compliant with medications and follow up, caring for self, employment, having dependents, and supportive family.   At this time pt is not an acute danger to herself or others. She has not voiced any SI/HI and patient's daughter also does not believe there are any safety concerns. Pt has the following risk factors for suicide: hx of bipolar, increased stress  Protective factors include ability to care for self, no psychosis/depression/montserrat/anxiety, no suicide attempt hx, compliant with medications and follow up, caring for self, employment, having dependents, and supportive family.

## 2023-07-26 NOTE — H&P ADULT - ASSESSMENT
BERTA FLORIAN is a 57yo Female with a PMH significant for Bipolar 1 disorder who is here for urgent HD for [place] toxicity.     Neuro   -    CV    Resp    GI    Metabolic    Renal  -    Endocrine    Heme    ID    Ethics  - Full code BERTA FLORIAN is a 59yo Female with a PMH significant for Bipolar 1 disorder who is here for urgent HD for Lithium toxicity.       ==========================================================  ==========================================================  ============INCOMPLETE NOTE; PATIENT TO BE SEEN TODAY=============  ==========================================================  ==========================================================      Neuro   #Bipolar disorder  - Obtunded, baseline AOx4   - Lithium and haldol on hold; awaiting Hemodialysis    CV  - stable, s/p 3L bolus fluids  - Fluid resuscitation as needed  - Monitor ekg     Resp  - sat >98% on RA    GI  - Diarrhea    Metabolic  - monitor electrolytes and replenish    Renal  - shiley placed at ED, start HD for Lithium tox   - Cr. 3.8, BUN 20     Endocrine  - Blood Glucose 168  - TSH pending     Heme  - DVT Prophylaxis Heparin 5000U     ID  # Bacteruria  - UA positive for bacteruria LE positive nitrite negative  - Leukocytosis on admission 14.03  - Cr. elevated to 3.8, BUN 20  - Ucx Bcx pending  - Ceftriaxone 1g Daily 7/16-    Ethics  - Full code BERTA FLORIAN is a 59yo Female with a PMH significant for Bipolar 1 disorder who is here for urgent HD for Lithium toxicity.       ==========================================================  ==========================================================  ============INCOMPLETE NOTE; PATIENT TO BE SEEN TODAY=============  ==========================================================  ==========================================================      Neuro   #Bipolar disorder  #Lithium Toxicity  - Obtunded, baseline AOx4   - Lithium and haldol on hold; awaiting Hemodialysis  - C    CV  - stable, s/p 3L bolus fluids  - Fluid resuscitation as needed  - Monitor ekg     Resp  - sat >98% on RA    GI  - Diarrhea    Metabolic  - monitor electrolytes and replenish    Renal  - shiley placed at ED, start HD for Lithium tox   - Cr. 3.8, BUN 20     Endocrine  - Blood Glucose 168  - TSH pending     Heme  - DVT Prophylaxis Heparin 5000U     ID  # Bacteruria  - UA positive for bacteruria LE positive nitrite negative  - Leukocytosis on admission 14.03  - Cr. elevated to 3.8, BUN 20  - Ucx Bcx pending  - Ceftriaxone 1g Daily 7/16-    Ethics  - Full code Continue Regimen: Over the counter benzoyl peroxide wash BERTA FLORIAN is a 59yo Female with a PMH significant for Bipolar 1 disorder who is here for urgent HD for [place] toxicity.     Neuro   #Bipolar disorder  #Lithium Toxicity  - Obtunded AOx1 only to person, baseline AOx4   - Lithium and haldol on hold; awaiting Hemodialysis  - Continue w/ Neuro checks    CV  - stable, s/p 3L bolus fluids  - Fluid resuscitation as needed  - Monitor on tele    Resp  - sat >98% on RA    GI  - Diarrhea, nausea and vomiting in setting of Lithium toxicity     Metabolic  - monitor electrolytes and replenish    Renal  - ICU team consulted Nephrology for urgent dialysis  - Cr. 3.8, BUN 20, producing 25cc/hr via sierra  - shiley placed at ED, start HD for Lithium tox     Endocrine  - Blood Glucose 168  - TSH pending     Heme  - DVT Prophylaxis Heparin 5000U     ID  # UTI  - Patient endorses buring w/ urination  - UA positive for bacteruria LE positive nitrite negative  - Leukocytosis on admission 14.03  - Cr. elevated to 3.8, BUN 20  - Ucx Bcx pending  - Ceftriaxone 1g Daily 7/16-    Ethics  - Full code Detail Level: Simple BERTA FLORIAN is a 57yo Female with a PMH significant for Bipolar 1 disorder who is here for urgent HD for [place] toxicity.     Neuro   #Bipolar disorder  #Lithium Toxicity  - Obtunded AOx1 only to person, baseline AOx4   - Lithium and haldol on hold; awaiting Hemodialysis  - Continue w/ Neuro checks for lithium toxicity    CV  - stable, s/p 3L bolus fluids  - Fluid resuscitation as needed  - Monitor on tele    Resp  - sat >98% on RA    GI  - Diarrhea, nausea and vomiting in setting of Lithium toxicity   - NPO    Metabolic  - monitor electrolytes and replenish after Dialysis   - Obtain BMP Mg Phos immediately after dialysis and then 6-hours post-dialysis     Renal  - ICU team consulted Nephrology for urgent dialysis  - Cr. 3.8, BUN 20, producing 25cc/hr via sierra  - shiley placed at ED, start HD for Lithium tox   - NS 100mL/hr until HD  - Obtain Blood Lithium levels immediately post dialysis and then 6-hours post dialysis    Endocrine  - Blood Glucose 168  - TSH pending     Heme  - DVT Prophylaxis Heparin 5000U     ID  # UTI  - Patient endorses buring w/ urination  - UA positive for bacteruria LE positive nitrite negative  - Leukocytosis on admission 14.03  - Cr. elevated to 3.8, BUN 20  - Ucx Bcx pending  - Ceftriaxone 1g Daily 7/16-    Ethics  - Full code

## 2023-07-26 NOTE — BH CONSULTATION LIAISON ASSESSMENT NOTE - HPI (INCLUDE ILLNESS QUALITY, SEVERITY, DURATION, TIMING, CONTEXT, MODIFYING FACTORS, ASSOCIATED SIGNS AND SYMPTOMS)
Patient is a 58F w Bipolar 1 who is here for urgent HD for Lithium toxicity a/w ALICJA/ATN c/b possible UTI. Patient is  but in a relationship, domiciled w/  daughter, her boyfriend, employed as a transport dispatch at [employer], w/ PPHx of bipolar d/o followed in [facility], w/ 4 prior psychiatric hospitalizations most recent one in 2016 at [facility] for "psychotic break" as per daughter, no known suicide attempt or violence hx, no known legal hx, no known substance abuse hx.   Spoke with daughter at bedside. Patient is sedated in ICU.  As per daughter, patient has a dx of bipolar disorder. she has a hx of inpatient admissions but has been doing well since 2016. Patient has been following outpatient with BLAYNE Castorena and compliant with medications.  Patient was recently on haldol and tapered off ( not for s/e , rather not longer psychotic). Has a previous bad s/e from a different antipsychotic but daughter cannot recall name. Daughter states patient has been stressed lately with her other daughter and having the responsibility of taking care of her grandchild, and still working. States patient is independent at baseline.     As per ICu collateral note:   "Spoke with outpatient psych BLAYNE Castorena and confirmed patient has hx of bipolar montserrat with psychosis. Patient was stable on 600 mg of lithium until May 2023 where she came to an appointment in a hypomanic state. At the time, patient complained of headaches and was taking Advil (unsure how much). Lithium levels drawn were 0.2. Lithium levels were rechecked on 5/26 and found to be 0.7. Patient reported dry mouth and no other complaints at the time. Patient was prescribed haldol 2.5 mg for paranoia.    Patient returned to clinic on 6/6 and was noted to be anxious. Denied any other sx's besides dry mouth and was observed to have returned to baseline. Lithium dosing was unchanged. Haldol dosing decreased to 1.25 mg daily.     Per patient's daughter, patient was not taking any medications besides the lithium, flovent, and albuterol. Patient stopped taking haldol about 3 months ago."

## 2023-07-26 NOTE — BH CONSULTATION LIAISON ASSESSMENT NOTE - NSBHCHARTREVIEWVS_PSY_A_CORE FT
Vital Signs Last 24 Hrs  T(C): 35.8 (26 Jul 2023 16:00), Max: 37.6 (26 Jul 2023 01:34)  T(F): 96.5 (26 Jul 2023 16:00), Max: 99.7 (26 Jul 2023 01:34)  HR: 82 (26 Jul 2023 17:00) (78 - 102)  BP: 111/54 (26 Jul 2023 17:00) (102/49 - 129/62)  BP(mean): 61 (26 Jul 2023 17:00) (59 - 93)  RR: 25 (26 Jul 2023 17:00) (13 - 30)  SpO2: 97% (26 Jul 2023 17:00) (88% - 100%)    Parameters below as of 26 Jul 2023 16:00  Patient On (Oxygen Delivery Method): nasal cannula

## 2023-07-26 NOTE — PATIENT PROFILE ADULT - NSFALLSECTIONLABEL_GEN_A_CORE
At [facility], one important tool we use to improve our patient services is our Patient Survey.  Following your visit you may receive our survey in the mail.    Please take the time to complete the survey.    If your visit with us was great, we want to hear about it.    If we can improve, please let us know how.     Medicare Wellness Visit  Plan for Preventive Care    A good way for you to stay healthy is to use preventive care.  Medicare covers many services that can help you stay healthy.* The goal of these services is to find any health problems as quickly as possible. Finding problems early can help make them easier to treat.  Your personal plan below lists the services you may need and when they are due.     Health Maintenance Summary     Topic Due On Due Status Completed On    MAMMOGRAM - BREAST CANCER SCREENING Sep 29, 2018 Not Due Sep 29, 2016    Colorectal Cancer Screening - Colonoscopy Dec 5, 2021 Not Due Dec 5, 2011    Osteoporosis Screening  Completed Nov 16, 2010    Immunization-Zoster  Completed Nov 16, 2010    Immunization - Pneumococcal  Completed Feb 10, 2016    Diabetes Eye Exam Mar 21, 2016 Overdue Mar 21, 2015    Glycohemoglobin A1C  (Diabetes Sugar)  Oct 28, 2017 Due Soon Apr 28, 2017    GFR  (Kidney Function Test)  Apr 28, 2018 Not Due Apr 28, 2017    Immunization - TDAP Pregnancy  Hidden     Diabetes Foot Exam  Mar 18, 2017 Overdue Mar 18, 2016    Medicare Wellness Visit Feb 16, 2018 Due Soon Feb 16, 2017    IMMUNIZATION - DTaP/Tdap/Td Mar 16, 2027 Not Due Mar 16, 2017    Immunization-Influenza Sep 1, 2017 Due On Sep 23, 2016           Preventive Care for Women and Men    Heart Screenings (Cardiovascular):  · Blood tests are used to check your cholesterol, lipid and triglyceride levels. High levels can increase your risk for heart disease and stroke. High levels can be treated with medications, diet and exercise. Lowering your levels can help keep your heart and blood vessels  Please schedule surgery when possible healthy.  Your provider will order these tests if they are needed.    · An ultrasound is done to see if you have an abdominal aortic aneurysm (AAA).  This is an enlargement of one of the main blood vessels that delivers blood to the body.   In the [place], 9,000 deaths are caused by AAA.  You may not even know you have this problem and as many as 1 in 3 people will have a serious problem if it is not treated.  Early diagnosis allows for more effective treatment and cure.  If you have a family history of AAA or are a male age 65-75 who has smoked, you are at higher risk of an AAA.  Your provider can order this test, if needed.    Colorectal Screening:  · There are many tests that are used to check for cancer of your colon and rectum. You and your provider should discuss what test is best for you and when to have it done.  Options include:  · Screening Colonoscopy: exam of the entire colon, seen through a flexible lighted tube.  · Flexible Sigmoidoscopy: exam of the last third (sigmoid portion) of the colon and rectum, seen through a flexible lighted tube.  · Cologuard DNA stool test: a sample of your stool is used to screen for cancer and unseen blood in your stool.  · Fecal Occult Blood Test: a sample of your stool is studied to find any unseen blood    Flu Shot:  · An immunization that helps to prevent influenza (the flu). You should get this every year. The best time to get the shot is in the fall.    Pneumococcal Shot:  • Vaccines are available that can help prevent pneumococcal disease, which is any type of infection caused by Streptococcus pneumoniae bacteria.   Their use can prevent some cases of pneumonia, meningitis, and sepsis. There are two types of pneumococcal vaccines:   o Conjugate vaccines (PCV-13 or Prevnar 13®) - helps protect against the 13 types of pneumococcal bacteria that are the most common causes of serious infections in children and adults.    o Polysaccharide vaccine (PPSV23 or  . Kepigqesw14®) - helps protect against 23 types of pneumococcal bacteria for patients who are recommended to get it.  These vaccines should be given at least 12 months apart.  A booster is usually not needed.     Hepatitis B Shot:  · An immunization that helps to protect people from getting Hepatitis B. Hepatitis B is a virus that spreads through contact with infected blood or body fluids. Many people with the virus do not have symptoms.  The virus can lead to serious problems, such as liver disease. Some people are at higher risk than others. Your doctor will tell you if you need this shot.     Diabetes Screening:  · A test to measure sugar (glucose) in your blood is called a fasting blood sugar. Fasting means you cannot have food or drink for at least 8 hours before the test. This test can detect diabetes long before you may notice symptoms.    Glaucoma Screening:  · Glaucoma screening is performed by your eye doctor. The test measures the fluid pressure inside your eyes to determine if you have glaucoma.     Hepatitis C Screening:  · A blood test to see if you have the hepatitis C virus.  Hepatitis C attacks the liver and is a major cause of chronic liver disease.  Medicare will cover a single screening for all adults born between 1945 & 1965, or high risk patients (people who have injected illegal drugs or people who have had blood transfusions).  High risk patients who continue to inject illegal drugs can be screened for Hepatitis C every year.    Smoking and Tobacco-Use Cessation Counseling:  · Tobacco is the single greatest cause of disease and early death in our country today. Medication and counseling together can increase a person’s chance of quitting for good.   · Medicare covers two quitting attempts per year, with four counseling sessions per attempt (eight sessions in a 12 month period)    Preventive Screening tests for Women    Screening Mammograms and Breast Exams:  · An x-ray of your breasts to check  for breast cancer before you or your doctor may be able to feel it.  If breast cancer is found early it can usually be treated with success.    Pelvic Exams and Pap Tests:  · An exam to check for cervical and vaginal cancer. A Pap test is a lab test in which cells are taken from your cervix and sent to the lab to look for signs of cervical cancer. If cancer of the cervix is found early, chances for a cure are good. Testing can generally end at age 65, or if a woman has a hysterectomy for a benign condition. Your provider may recommend more frequent testing if certain abnormal results are found.    Bone Mass Measurements:  · A painless x-ray of your bone density to see if you are at risk for a broken bone. Bone density refers to the thickness of bones or how tightly the bone tissue is packed.    Preventive Screening tests for Men    Prostate Screening:  · PSA - Prostate Cancer blood test.  Experts do not recommend routine screening of healthy men with no signs or symptoms of prostate disease.  However, men should not ignore urinary symptoms, and should discuss their family history with their doctor.    *Medicare pays for many preventive services to keep you healthy. For some of these services, you might have to pay a deductible, coinsurance, and / or copayment.  The amounts vary depending on the type of services you need and the kind of Medicare health plan you have.

## 2023-07-26 NOTE — CONSULT NOTE ADULT - SUBJECTIVE AND OBJECTIVE BOX
**********NOTE INCOMPLETE AT THIS TIME**********   MEDICAL TOXICOLOGY CONSULT    HPI:  Pt is 58 Y f H/O Bipolar on chronic lithium, p/w lethargy, weakness, N/V/AMS. Per daughter states for the past 1 week patient has had new weakness, N/V, over the past 3 days experiencing new AMs, confusion. Last night developed speech latency and myoclonic jerking. States significant dehydration with decreased PO intake. Evaluation in ED positive for new ALICJA with Cr >3 baseline ~1, elevated Lithium level at 5..3      ONSET / TIME of exposure(s): Chronic    QUANTITY of exposure(s): Consistent 600 mg QD    ROUTE of exposure:  Ingestion    CONTEXT of exposure: Brought in by daughter from psych office    ASSOCIATED symptoms: N/V/confusion, weakness    PAST MEDICAL & SURGICAL HISTORY:  Bipolar 1 disorder      No significant past surgical history          MEDICATION HISTORY:  heparin   Injectable 5000 Unit(s) SubCutaneous every 12 hours      FAMILY HISTORY:  No pertinent family history in first degree relatives        REVIEW OF SYSTEMS:  GENERAL: No fever or chills  EYES: No change in vision  HEENT: No trouble swallowing or speaking  CARDIAC: No chest pain  PULMONARY: No cough or SOB  GI: No abdominal pain, + nausea + vomiting, no diarrhea or constipation  SKIN: No rashes  NEURO: + confusion, + myoclonic jerking motions  Otherwise as HPI or negative.        Vital Signs Last 24 Hrs  T(C): 36.6 (2023 20:50), Max: 37.3 (2023 18:43)  T(F): 97.8 (2023 20:50), Max: 99.2 (2023 18:43)  HR: 89 (2023 23:50) (82 - 95)  BP: 129/62 (2023 23:50) (103/66 - 129/62)  BP(mean): --  RR: 16 (2023 23:50) (16 - 20)  SpO2: 100% (2023 23:50) (96% - 100%)    Parameters below as of 2023 23:50  Patient On (Oxygen Delivery Method): room air    Physical Exam:  General: NAD, Conversive with prolonged episodes of speech latency  Eyes: EOMI, Conjunctiva and sclera clear, pupils b/L reactive 3-4 mm, + R hernandez nystagmus.  Neck: No JVD  Lungs: Clear to auscultation bilaterally, no wheeze, no rhonchi  Heart: Normal S1, S2, no murmurs  Abdomen: Soft, nontender, nondistended  Extremities: 2+ peripheral pulses, no edema, + episodes of myoclonus  Psych: AAO X3  Neurologic: + central ataxia with disdiadokinesia of UE, tongue tremors      SIGNIFICANT LABORATORY STUDIES:                        14.1   14.03 )-----------( 368      ( 2023 17:53 )             44.8           137  |  107  |  25<H>  ----------------------------<  116<H>  4.0   |  19<L>  |  3.60<H>    Ca    9.0      2023 00:13  Phos  2.0       Mg     1.80         TPro  8.2  /  Alb  5.0  /  TBili  0.5  /  DBili  x   /  AST  14  /  ALT  12  /  AlkPhos  88        PT/INR - ( 2023 18:00 )   PT: 12.3 sec;   INR: 1.10 ratio         PTT - ( 2023 18:00 )  PTT:22.1 sec    Urinalysis Basic - ( 2023 00:13 )    Color: x / Appearance: x / SG: x / pH: x  Gluc: 116 mg/dL / Ketone: x  / Bili: x / Urobili: x   Blood: x / Protein: x / Nitrite: x   Leuk Esterase: x / RBC: x / WBC x   Sq Epi: x / Non Sq Epi: x / Bacteria: x        Anion Gap: 11  @ 00:13  Anion Gap: 13  @ 17:53  VB   @ 17:53  Lithium level 5.3  APAP, ASA, negative

## 2023-07-26 NOTE — H&P ADULT - ATTENDING COMMENTS
pt is a 59 yo female with hx bipolar disorder , [place] [facility] [place]  the last 10 years, presents with weakness, lethargy  nausea and vomiting, per daughter, no hx cough, sob, chest pain,  headache,  pt noted to have lithium level 5.  seen by toxicology for  urgent hemodialysis.    PE neuro, opens eyes,   bp 130/76 rr 18 heent no jvd, lungs clear  abdomen nontender ext no edema    labs as above   wbc 14 hgb14  hct 44  bicarb 19 cr 3.60    A/P  59 yo female with lithium toxicity, will need urgent hemodialysis  -monitor urine output  -f/u lithium level  -serum tox  -continue ivf   -psych follow up  -panculture, blood,urine  -check urine osm, serum osm  -dvt prophylaxis  -check echo to evaluate lv function  -critically ill with lithium toxicity

## 2023-07-26 NOTE — PROGRESS NOTE ADULT - ASSESSMENT
Assessment	  Severe lithium toxicity    Toxicologic Context: Lithium is a monovalent cation used as a mood stabilizer. Toxicity can be from acute ingestion, acute-on-chronic, or chronic. Lithium is mainly a neurotoxin. Mild-moderate symptoms include lethargy, weakness, slurred speech, incoordination, tremors, myoclonic jerks, rigidity, and/or extrapyramidal effects. Severe symptoms include agitated delirium, coma, seizures, and hyperthermia Recovery may be slow, and patients may develop delayed neurological sequale of persistent cerebellar and cognitive dysfunction (sometimes referred to as SILENT or syndrome of irreversible lithium-effectuated neurotoxicity). Sometimes, dysrhythmias and leukocytosis may be seen. Chronic toxicity may cause nephrogenic diabetes insipidus, hyperparaythyroidism and/or hypothyroidism/hyperthyroidism.    The patient is presenting with significant AMS, nausea, vomiting, speech latency, ataxia, and myoclonus with elevated creatinine and a lithium level >5, definitively a candidate for dialysis to expedite removal of Lithium.    The patient remains significantly symptomatic for lithium toxicity. Initially experienced 2 hr Dialysis session, with inadequate removal of lithium. Pt remains with significant CNS toxicity      Recommendations:  Treatment:   1)  Fluid rehydration with LR, no acute ingestion per discussion with patient and daughter, likely in setting of dehydration precipitating lithium toxicity, patient clinically dry on exam, complete rehydration  2)  Inadequate clearance of Lithium with 2 hr session of HD, While attempting second session patient became significantly hypotensive, with some fluid responsiveness. After discussion with nephrology likely 2/2 HD set with fluid removal in the setting of significant hypovolemic status and dehydration.  3) ESSENTIAL to continue HD, please continue fluid resuscitation, administor vasopressor agents such as norepinephrine to sustain BP during euvolemic HD. If unable to tolerate HD, can convert to CRRT; however HD will be significantly better for clearence.  4) After HD session (Ideally 6 hr) please recheck lithium level 1-2 hours after session's completion.  5) Symptomatic management for AMS, airway, seizures.    6) Discussed with Nephrology fellow, Patient is currently (2300) 3 hours into HD session, plan for continued 6 hour Dialysis session please draw lithium level 1-2 hours post completion of HD. Will review results and provide recommendations after repeat level.    All plans discussed with primary managing team.    Thank you for the consultation. Please reach out via Teams with any questions.  Estiven Rivera MD, Medical Toxicology Fellow

## 2023-07-26 NOTE — PROGRESS NOTE ADULT - ASSESSMENT
BERTA FLORIAN is a 57yo Female with a PMH significant for Bipolar 1 disorder who is here for urgent HD for [place] toxicity.     Neuro   #Bipolar disorder  #Lithium Toxicity  - Obtunded AOx1 only to person, baseline AOx4   - Lithium and haldol on hold; awaiting Hemodialysis  - Continue w/ Neuro checks for lithium toxicity    CV  - stable, s/p 3L bolus fluids  - Fluid resuscitation as needed  - Monitor on tele    Resp  - sat >98% on RA    GI  - Diarrhea, nausea and vomiting in setting of Lithium toxicity   - NPO    Metabolic  - monitor electrolytes and replenish after Dialysis   - Obtain BMP Mg Phos immediately after dialysis and then 6-hours post-dialysis     Renal  - ICU team consulted Nephrology for urgent dialysis  - Cr. 3.8, BUN 20, producing 25cc/hr via sierra  - shiley placed at ED, start HD for Lithium tox   - NS 100mL/hr until HD  - Obtain Blood Lithium levels immediately post dialysis and then 6-hours post dialysis    Endocrine  - Blood Glucose 168  - TSH pending     Heme  - DVT Prophylaxis Heparin 5000U     ID  # UTI  - Patient endorses buring w/ urination  - UA positive for bacteruria LE positive nitrite negative  - Leukocytosis on admission 14.03  - Cr. elevated to 3.8, BUN 20  - Ucx Bcx pending  - Ceftriaxone 1g Daily 7/16-    Ethics  - Full code BERTA FLORIAN is a 57yo Female with a PMH significant for Bipolar 1 disorder who is here for urgent HD for [place] toxicity.     Neuro   #Bipolar disorder  #Lithium Toxicity  - Obtunded AOx1 only to person, baseline AOx4   - Lithium and haldol on hold; awaiting Hemodialysis  - Continue w/ Neuro checks for lithium toxicity    CV  - stable, s/p 3L bolus fluids  - Fluid resuscitation as needed  - Monitor on tele    Resp  - sat >98% on RA    GI  - Diarrhea, nausea and vomiting in setting of Lithium toxicity   - NPO    Metabolic  - monitor electrolytes and replenish after Dialysis   - Obtain BMP Mg Phos immediately after dialysis and then 6-hours post-dialysis     Renal  - ICU team consulted Nephrology for urgent dialysis  - Cr. 3.8, BUN 20, producing 25cc/hr via sierra  - shiley placed at ED  - NS 100mL/hr until HD  - repeat lithium 3.2 post-dialysis  - per nephro and toxicology will plan for dialysis at 3:30 PM today    Endocrine  - Blood Glucose 168  - TSH pending     Heme  - DVT Prophylaxis Heparin 5000U     ID  # UTI  - Patient endorses buring w/ urination  - UA positive for bacteruria LE positive nitrite negative  - Leukocytosis on admission 14.03  - Cr. elevated to 3.8, BUN 20  - Ucx Bcx pending  - Ceftriaxone 1g Daily 7/16-    Ethics  - Full code

## 2023-07-26 NOTE — H&P ADULT - NSHPLABSRESULTS_GEN_ALL_CORE
ICU Vital Signs Last 24 Hrs  T(C): 37.6 (26 Jul 2023 01:34), Max: 37.6 (26 Jul 2023 01:34)  T(F): 99.7 (26 Jul 2023 01:34), Max: 99.7 (26 Jul 2023 01:34)  HR: 89 (26 Jul 2023 02:00) (82 - 95)  BP: 120/56 (26 Jul 2023 02:00) (103/66 - 129/62)  BP(mean): 70 (26 Jul 2023 02:00) (70 - 93)  ABP: --  ABP(mean): --  RR: 16 (26 Jul 2023 02:00) (15 - 20)  SpO2: 99% (26 Jul 2023 02:00) (96% - 100%)    O2 Parameters below as of 26 Jul 2023 02:00  Patient On (Oxygen Delivery Method): room air                          14.1   14.03 )-----------( 368      ( 25 Jul 2023 17:53 )             44.8     137  |  107  |  25<H>  ----------------------------<  116<H>  4.0   |  19<L>  |  3.60<H>    Ca    9.0      26 Jul 2023 00:13  Phos  2.0     07-26  Mg     1.80     07-26    TPro  8.2  /  Alb  5.0  /  TBili  0.5  /  DBili  x   /  AST  14  /  ALT  12  /  AlkPhos  88  07-25    Lithium Level, Serum: 5.2
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
## 2023-07-26 NOTE — PROGRESS NOTE ADULT - SUBJECTIVE AND OBJECTIVE BOX
MEDICAL TOXICOLOGY PROGRESS NOTE    Overnight events: The patient remains significantly symptomatic for lithium toxicity with AMS, disdiadokinesia, myoclonus. Initially experienced 2 hr Dialysis session, with inadequate removal of lithium. Pt remains with significant CNS toxicity, s/p 2nd dialysis session which lasted for ~40 minutes with cessation due to hypotension.    MEDICATIONS  (STANDING):  cefTRIAXone   IVPB 1000 milliGRAM(s) IV Intermittent every 24 hours  chlorhexidine 2% Cloths 1 Application(s) Topical daily  dexMEDEtomidine Infusion 0.2 MICROgram(s)/kG/Hr (3.87 mL/Hr) IV Continuous <Continuous>  heparin   Injectable 5000 Unit(s) SubCutaneous every 8 hours  norepinephrine Infusion 0.05 MICROgram(s)/kG/Min (7.25 mL/Hr) IV Continuous <Continuous>  sodium chloride 0.9%. 1000 milliLiter(s) (100 mL/Hr) IV Continuous <Continuous>    MEDICATIONS  (PRN):  haloperidol    Injectable 2.5 milliGRAM(s) IntraMuscular every 6 hours PRN Agitation      Vital Signs Last 24 Hrs  T(C): 35.6 (26 Jul 2023 20:40), Max: 37.6 (26 Jul 2023 01:34)  T(F): 96 (26 Jul 2023 20:40), Max: 99.7 (26 Jul 2023 01:34)  HR: 69 (26 Jul 2023 22:00) (67 - 102)  BP: 119/55 (26 Jul 2023 22:00) (82/52 - 139/68)  BP(mean): 70 (26 Jul 2023 22:00) (53 - 93)  RR: 15 (26 Jul 2023 22:00) (11 - 30)  SpO2: 98% (26 Jul 2023 22:00) (88% - 100%)    Parameters below as of 26 Jul 2023 16:00  Patient On (Oxygen Delivery Method): nasal cannula        Allergies    penicillins (Unknown)  amoxicillin (Unknown)  penicillin (Hives)    Intolerances          REVIEW OF SYSTEMS:   Primary ROS complaint of dizziness, confusion, unable to interact with ROS furterh     PHYSICAL EXAM  Vital Signs Last 24 Hrs  T(C): 35.6 (26 Jul 2023 20:40), Max: 37.6 (26 Jul 2023 01:34)  T(F): 96 (26 Jul 2023 20:40), Max: 99.7 (26 Jul 2023 01:34)  HR: 69 (26 Jul 2023 22:00) (67 - 102)  BP: 119/55 (26 Jul 2023 22:00) (82/52 - 139/68)  BP(mean): 70 (26 Jul 2023 22:00) (53 - 93)  RR: 15 (26 Jul 2023 22:00) (11 - 30)  SpO2: 98% (26 Jul 2023 22:00) (88% - 100%)    Parameters below as of 26 Jul 2023 16:00  Patient On (Oxygen Delivery Method): nasal cannula      General:    Exam at 1300  Physical Exam:  General: NAD, Conversive with minimal speech latency  Eyes: EOMI, Conjunctia and sclera clear, mild nystagmus R. hernandez, improved from 13 hours prior  Neck: No JVD  Lungs: Breathing well on RA  Heart: Normal S1, S2, no murmurs  Abdomen: Soft, nontender, nondistended, no CVA tenderness  Extremities: 2+ peripheral pulses, no edema, + inducible myoclonus on UE and LE  Psych: AAO X1  Neurologic: + central ataxia with disdiadokinesia of UE, mild tongue tremors      SIGNIFICANT LABORATORY STUDIES:                        14.1   14.03 )-----------( 368      ( 25 Jul 2023 17:53 )             44.8       07-26    142  |  108<H>  |  22  ----------------------------<  114<H>  3.2<L>   |  23  |  2.16<H>    Ca    8.4      26 Jul 2023 17:10  Phos  2.9     07-26  Mg     2.50     07-26    TPro  8.2  /  Alb  5.0  /  TBili  0.5  /  DBili  x   /  AST  14  /  ALT  12  /  AlkPhos  88  07-25      PT/INR - ( 25 Jul 2023 18:00 )   PT: 12.3 sec;   INR: 1.10 ratio         PTT - ( 25 Jul 2023 18:00 )  PTT:22.1 sec    Urinalysis Basic - ( 26 Jul 2023 17:10 )    Color: x / Appearance: x / SG: x / pH: x  Gluc: 114 mg/dL / Ketone: x  / Bili: x / Urobili: x   Blood: x / Protein: x / Nitrite: x   Leuk Esterase: x / RBC: x / WBC x   Sq Epi: x / Non Sq Epi: x / Bacteria: x        Anion Gap: 11 07-26 @ 17:10  Anion Gap: 10 07-26 @ 13:14  CK: -- 07-26 @ 13:14  Anion Gap: 12 07-26 @ 09:22  Aspirin Level: <0.3<L>  07-26 @ 09:22  Acetaminophen Level:  <10<L>  07-26 @ 09:22  Anion Gap: 11 07-26 @ 00:13  Anion Gap: 13 07-25 @ 17:53    Lithium level 00:15 5.3  Lithium level 09:22 3.2  Lithium level 13:14 3.5  Lithium level 17:10 3.5

## 2023-07-26 NOTE — PROVIDER CONTACT NOTE (OTHER) - SITUATION
Early termination of dialysis treatment due to hypotension. ( see dialysis flowsheet in patients chart for blood pressure readings) Patient didn't have another line for pressors to be started, Patient

## 2023-07-26 NOTE — BH CONSULTATION LIAISON ASSESSMENT NOTE - SUMMARY
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, w/ PPHx of bipolar d/o followed in [facility], w/ 4 prior psychiatric hospitalizations most recent one 7/30-8/4 at [facility], denied suicide attempt or violence hx, denied legal hx, denied substance abuse hx, denied PMHx BIB daughter for insomnia since discharge from hospital and hours of pacing throughout the day.     Patient denies feeling depressed or struggling anhedonia, anxious or having any psychotic or montserrat sx.  Patient's daughter confirmed this.  No safety concerns are voiced.  No hx of suicide attempts.  Patient seems to be struggling with insomnia and EPS from Haldol, likely akathisia.  We gave Ativan 1mg and Benadryl 50mg in the ER.      Patient does not meet criteria for inpatient treatment as patient does not appear to be a danger to herself or others and patient is continuing to care for herself. She has an appt with her psychiatrist, Dr. Garcia, on 8/16/16 (Tuesday) at 3:30 pm who she reports she has a good relationship with. Patient and daughter are agreeable to discharage home with starting Benadryl 50mg at bedtime for EPS/insomnia, MDD2 and seeing Dr. Garcia on Tuesday for further med management.  They understand to get this over the counter. Patient is a 58F w Bipolar 1 who is here for urgent HD for Lithium toxicity a/w ALICJA/ATN c/b possible UTI. 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   Spoke with daughter at bedside. Patient is sedated in ICU.  As per daughter, patient has a dx of bipolar disorder. she has a hx of inpatient admissions but has been doing well since 2016. Patient has been following outpatient with BLAYNE Castorena and compliant with medications.  Patient was recently on haldol and tapered off ( not for s/e , rather not longer psychotic). Has a previous bad s/e from a different antipsychotic but daughter cannot recall name. Daughter states patient has been stressed lately with her other daughter and having the responsibility of taking care of her grandchild, and still working. States patient is independent at baseline.     PLAN  - in icu on precedex  - defer level of observation to primary team  - EKG  - agree with primary and tox - HD in progress - monitor lithium levels, hydration   - HOLD Psychotropic medications  - CL will follow Patient is a 58F w Bipolar 1 who is here for urgent HD for Lithium toxicity a/w ALICJA/ATN c/b possible UTI. 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 [place] for "psychotic break" as per daughter, no known suicide attempt or violence hx, no known legal hx, no known substance abuse hx.   Spoke with daughter at bedside. Patient is sedated in ICU.  As per daughter, patient has a dx of bipolar disorder. she has a hx of inpatient admissions but has been doing well since 2016. Patient has been following outpatient with BLAYNE Castorena and compliant with medications.  Patient was recently on haldol and tapered off ( not for s/e , rather not longer psychotic). Has a previous bad s/e from a different antipsychotic but daughter cannot recall name. Daughter states patient has been stressed lately with her other daughter and having the responsibility of taking care of her grandchild, and still working. States patient is independent at baseline.     PLAN  - in icu on precedex  - defer level of observation to primary team  - EKG  - agree with primary and tox - HD in progress - monitor lithium levels, hydration   - HOLD Psychotropic medications  -May continue Haldol PRN as written if qtc <500  -Management of Lithium toxicity as per MICU team   - CL will follow

## 2023-07-26 NOTE — CONSULT NOTE ADULT - SUBJECTIVE AND OBJECTIVE BOX
[facility] DIVISION OF KIDNEY DISEASES AND HYPERTENSION -- [phone]  -- INITIAL CONSULT NOTE  --------------------------------------------------------------------------------  HPI: 57 yo F with h/o bipolar disorder (on lithium) presenting with lethargy for 1 week with nausea, vomiting, diarrhea, and decreased PO intake. Nephrology was consulted for ALICJA and elevated lithium level. On review of [place], Scr was WNL at 1.02 on 5/26/23. Scr was elevated to 3.81 on ER labs performed on 7/24. Pt. received IVF. Scr remains elevated at 3.60 today. Lithium level was elevated to 5.3 on admission (7/25), and remains elevated at 5.2 today despite IV fluids. RIJ non-tunneled HD catheter was placed in the ER.    Pt. was seen and evaluated in the MICU this morning. Pt. with AMS, non-conversive. History was obtained from the chart. Unable to obtain ROS.    PAST HISTORY  --------------------------------------------------------------------------------  PAST MEDICAL & SURGICAL HISTORY:  Bipolar 1 disorder      No significant past surgical history        FAMILY HISTORY:    PAST SOCIAL HISTORY:    ALLERGIES & MEDICATIONS  --------------------------------------------------------------------------------  Allergies    penicillins (Unknown)  amoxicillin (Unknown)  penicillin (Hives)    Intolerances      Standing Inpatient Medications  cefTRIAXone   IVPB 1000 milliGRAM(s) IV Intermittent every 24 hours  chlorhexidine 4% Liquid 1 Application(s) Topical <User Schedule>  heparin   Injectable 5000 Unit(s) SubCutaneous every 12 hours  sodium chloride 0.9%. 1000 milliLiter(s) IV Continuous <Continuous>    PRN Inpatient Medications      REVIEW OF SYSTEMS  --------------------------------------------------------------------------------  See HPI    VITALS/PHYSICAL EXAM  --------------------------------------------------------------------------------  T(C): 37.6 (07-26-23 @ 01:34), Max: 37.6 (07-26-23 @ 01:34)  HR: 89 (07-26-23 @ 02:00) (82 - 95)  BP: 120/56 (07-26-23 @ 02:00) (103/66 - 129/62)  RR: 16 (07-26-23 @ 02:00) (15 - 20)  SpO2: 99% (07-26-23 @ 02:00) (96% - 100%)  Wt(kg): --  Height (cm): 175.3 (07-26-23 @ 01:34)  Weight (kg): 77.3 (07-26-23 @ 01:34)  BMI (kg/m2): 25.2 (07-26-23 @ 01:34)  BSA (m2): 1.93 (07-26-23 @ 01:34)      Physical Exam:  Gen: AMS, non-conversive  HEENT: MMM  Pulm: CTA B/L  CV: S1S2  Abd: Soft, +BS   Ext: No LE edema B/L  Neuro: AMS  Skin: Warm and dry  Vascular access: +R IJ non-tunneled HD catheter    LABS/STUDIES  --------------------------------------------------------------------------------              14.1   14.03 >-----------<  368      [07-25-23 @ 17:53]              44.8     137  |  107  |  25  ----------------------------<  116      [07-26-23 @ 00:13]  4.0   |  19  |  3.60        Ca     9.0     [07-26-23 @ 00:13]      Mg     1.80     [07-26-23 @ 00:13]      Phos  2.0     [07-26-23 @ 00:13]    TPro  8.2  /  Alb  5.0  /  TBili  0.5  /  DBili  x   /  AST  14  /  ALT  12  /  AlkPhos  88  [07-25-23 @ 17:53]    PT/INR: PT 12.3 , INR 1.10       [07-25-23 @ 18:00]  PTT: 22.1       [07-25-23 @ 18:00]    Creatinine Trend:  SCr 3.60 [07-26 @ 00:13]  SCr 3.81 [07-25 @ 17:53]    HbA1c 5.6      [05-16-19 @ 12:00]  TSH 0.48      [07-25-23 @ 17:53]  Lipid: chol 159, , HDL 33, LDL --      [05-26-23 @ 13:30] [facility] DIVISION OF KIDNEY DISEASES AND HYPERTENSION -- [phone]  -- INITIAL CONSULT NOTE  --------------------------------------------------------------------------------  HPI: 57 yo F with h/o bipolar disorder (on lithium) presenting with lethargy for 1 week with nausea, vomiting, diarrhea, and decreased PO intake. Nephrology was consulted for ALICJA and elevated lithium level. On review of [place], Scr was WNL at 1.02 on 5/26/23. Scr was elevated to 3.81 on ER labs performed on 7/24. Pt. received IVF. Scr remains elevated at 3.60 today. Lithium level was elevated to 5.3 on admission (7/25), and remains elevated at 5.2 today despite IV fluids. RIJ non-tunneled HD catheter was placed in the ER.    Pt. was seen and evaluated in the MICU this morning. Pt. with AMS, non-conversive. History was obtained from the chart. Unable to obtain ROS.    PAST HISTORY  --------------------------------------------------------------------------------  PAST MEDICAL & SURGICAL HISTORY:  Bipolar 1 disorder      No significant past surgical history        FAMILY HISTORY: Unable to attain    PAST SOCIAL HISTORY: Unable to attain    ALLERGIES & MEDICATIONS  --------------------------------------------------------------------------------  Allergies    penicillins (Unknown)  amoxicillin (Unknown)  penicillin (Hives)    Intolerances      Standing Inpatient Medications  cefTRIAXone   IVPB 1000 milliGRAM(s) IV Intermittent every 24 hours  chlorhexidine 4% Liquid 1 Application(s) Topical <User Schedule>  heparin   Injectable 5000 Unit(s) SubCutaneous every 12 hours  sodium chloride 0.9%. 1000 milliLiter(s) IV Continuous <Continuous>    PRN Inpatient Medications      REVIEW OF SYSTEMS  --------------------------------------------------------------------------------  See HPI    VITALS/PHYSICAL EXAM  --------------------------------------------------------------------------------  T(C): 37.6 (07-26-23 @ 01:34), Max: 37.6 (07-26-23 @ 01:34)  HR: 89 (07-26-23 @ 02:00) (82 - 95)  BP: 120/56 (07-26-23 @ 02:00) (103/66 - 129/62)  RR: 16 (07-26-23 @ 02:00) (15 - 20)  SpO2: 99% (07-26-23 @ 02:00) (96% - 100%)  Wt(kg): --  Height (cm): 175.3 (07-26-23 @ 01:34)  Weight (kg): 77.3 (07-26-23 @ 01:34)  BMI (kg/m2): 25.2 (07-26-23 @ 01:34)  BSA (m2): 1.93 (07-26-23 @ 01:34)      Physical Exam:  Gen: AMS, non-conversive  HEENT: MMM  Pulm: CTA B/L  CV: S1S2  Abd: Soft, +BS   Ext: No LE edema B/L  Neuro: AMS  Skin: Warm and dry  Vascular access: +R IJ non-tunneled HD catheter    LABS/STUDIES  --------------------------------------------------------------------------------              14.1   14.03 >-----------<  368      [07-25-23 @ 17:53]              44.8     137  |  107  |  25  ----------------------------<  116      [07-26-23 @ 00:13]  4.0   |  19  |  3.60        Ca     9.0     [07-26-23 @ 00:13]      Mg     1.80     [07-26-23 @ 00:13]      Phos  2.0     [07-26-23 @ 00:13]    TPro  8.2  /  Alb  5.0  /  TBili  0.5  /  DBili  x   /  AST  14  /  ALT  12  /  AlkPhos  88  [07-25-23 @ 17:53]    PT/INR: PT 12.3 , INR 1.10       [07-25-23 @ 18:00]  PTT: 22.1       [07-25-23 @ 18:00]    Creatinine Trend:  SCr 3.60 [07-26 @ 00:13]  SCr 3.81 [07-25 @ 17:53]    HbA1c 5.6      [05-16-19 @ 12:00]  TSH 0.48      [07-25-23 @ 17:53]  Lipid: chol 159, , HDL 33, LDL --      [05-26-23 @ 13:30]

## 2023-07-26 NOTE — BH CONSULTATION LIAISON ASSESSMENT NOTE - CURRENT MEDICATION
MEDICATIONS  (STANDING):  cefTRIAXone   IVPB 1000 milliGRAM(s) IV Intermittent every 24 hours  chlorhexidine 2% Cloths 1 Application(s) Topical daily  dexMEDEtomidine Infusion 0.2 MICROgram(s)/kG/Hr (3.87 mL/Hr) IV Continuous <Continuous>  heparin   Injectable 5000 Unit(s) SubCutaneous every 8 hours  lactated ringers Bolus 1000 milliLiter(s) IV Bolus once  sodium chloride 0.9%. 1000 milliLiter(s) (100 mL/Hr) IV Continuous <Continuous>    MEDICATIONS  (PRN):  haloperidol    Injectable 2.5 milliGRAM(s) IntraMuscular every 6 hours PRN Agitation

## 2023-07-26 NOTE — BH CONSULTATION LIAISON ASSESSMENT NOTE - NSBHCHARTREVIEWLAB_PSY_A_CORE FT
14.1   14.03 )-----------( 368      ( 25 Jul 2023 17:53 )             44.8   07-26    142  |  108<H>  |  22  ----------------------------<  114<H>  3.2<L>   |  23  |  2.16<H>    Ca    8.4      26 Jul 2023 17:10  Phos  2.9     07-26  Mg     2.50     07-26    TPro  8.2  /  Alb  5.0  /  TBili  0.5  /  DBili  x   /  AST  14  /  ALT  12  /  AlkPhos  88  07-25    lithium level trending - on HD - 3.6

## 2023-07-26 NOTE — PROGRESS NOTE ADULT - SUBJECTIVE AND OBJECTIVE BOX
INTERVAL HPI/OVERNIGHT EVENTS:  No significant overnight events.    SUBJECTIVE:   Patient seen and examined at bedside.       VITAL SIGNS:  ICU Vital Signs Last 24 Hrs  T(C): 37 (26 Jul 2023 07:34), Max: 37.6 (26 Jul 2023 01:34)  T(F): 98.6 (26 Jul 2023 07:34), Max: 99.7 (26 Jul 2023 01:34)  HR: 92 (26 Jul 2023 07:34) (82 - 95)  BP: 105/57 (26 Jul 2023 07:34) (103/66 - 129/62)  BP(mean): 72 (26 Jul 2023 07:00) (66 - 93)  ABP: --  ABP(mean): --  RR: 16 (26 Jul 2023 07:34) (15 - 20)  SpO2: 98% (26 Jul 2023 07:00) (94% - 100%)    O2 Parameters below as of 26 Jul 2023 07:34  Patient On (Oxygen Delivery Method): room air            Plateau pressure:   P/F ratio:     07-25 @ 07:01  -  07-26 @ 07:00  --------------------------------------------------------  IN: 150 mL / OUT: 250 mL / NET: -100 mL    07-26 @ 07:01  -  07-26 @ 08:09  --------------------------------------------------------  IN: 400 mL / OUT: 400 mL / NET: 0 mL      CAPILLARY BLOOD GLUCOSE      POCT Blood Glucose.: 189 mg/dL (25 Jul 2023 15:52)    ECG:    PHYSICAL EXAM:  General: NAD  HEENT: Atraumatic, normocephalic  Neck: Supple, trachea midline  Respiratory: CTAB, no wheezes/rales/rhonchi  Cardiovascular: RRR, normal S1/S2, no murmurs/rubs/gallops  Abdomen: Soft, nontender, nondistended; bowel sounds present  Extremities: 2+ pulses b/l radial and dorsalis pedis; no clubbing/cyanosis/edema  SKIN: Warm, dry, normal color; no rashes or abnormal lesions   Neurological: AOx3. No FND’s. CN’s 2-12 grossly intact. Strength and sensation grossly intact.    MEDICATIONS:  MEDICATIONS  (STANDING):  cefTRIAXone   IVPB 1000 milliGRAM(s) IV Intermittent every 24 hours  chlorhexidine 2% Cloths 1 Application(s) Topical daily  heparin   Injectable 5000 Unit(s) SubCutaneous every 8 hours  sodium chloride 0.9%. 1000 milliLiter(s) (100 mL/Hr) IV Continuous <Continuous>    MEDICATIONS  (PRN):      ALLERGIES:  Allergies    penicillins (Unknown)  amoxicillin (Unknown)  penicillin (Hives)    Intolerances        LABS:                        14.1   14.03 )-----------( 368      ( 25 Jul 2023 17:53 )             44.8     07-26    137  |  107  |  25<H>  ----------------------------<  116<H>  4.0   |  19<L>  |  3.60<H>    Ca    9.0      26 Jul 2023 00:13  Phos  2.0     07-26  Mg     1.80     07-26    TPro  8.2  /  Alb  5.0  /  TBili  0.5  /  DBili  x   /  AST  14  /  ALT  12  /  AlkPhos  88  07-25    PT/INR - ( 25 Jul 2023 18:00 )   PT: 12.3 sec;   INR: 1.10 ratio         PTT - ( 25 Jul 2023 18:00 )  PTT:22.1 sec  Urinalysis Basic - ( 26 Jul 2023 00:13 )    Color: x / Appearance: x / SG: x / pH: x  Gluc: 116 mg/dL / Ketone: x  / Bili: x / Urobili: x   Blood: x / Protein: x / Nitrite: x   Leuk Esterase: x / RBC: x / WBC x   Sq Epi: x / Non Sq Epi: x / Bacteria: x        RADIOLOGY & ADDITIONAL TESTS: Reviewed. INTERVAL HPI/OVERNIGHT EVENTS:  Patient received HD overnight. Lithium levels pending. Toxicology is following.    SUBJECTIVE:   Patient seen and examined at bedside. She is obtunded and only able to answer her name.     During rounds, patient got up from her bed and pulled out her lines. Patient was oriented back to bed and did not require sedation. Peripheral lines were replaced.       VITAL SIGNS:  ICU Vital Signs Last 24 Hrs  T(C): 37 (26 Jul 2023 07:34), Max: 37.6 (26 Jul 2023 01:34)  T(F): 98.6 (26 Jul 2023 07:34), Max: 99.7 (26 Jul 2023 01:34)  HR: 92 (26 Jul 2023 07:34) (82 - 95)  BP: 105/57 (26 Jul 2023 07:34) (103/66 - 129/62)  BP(mean): 72 (26 Jul 2023 07:00) (66 - 93)  ABP: --  ABP(mean): --  RR: 16 (26 Jul 2023 07:34) (15 - 20)  SpO2: 98% (26 Jul 2023 07:00) (94% - 100%)    O2 Parameters below as of 26 Jul 2023 07:34  Patient On (Oxygen Delivery Method): room air            Plateau pressure:   P/F ratio:     07-25 @ 07:01  -  07-26 @ 07:00  --------------------------------------------------------  IN: 150 mL / OUT: 250 mL / NET: -100 mL    07-26 @ 07:01  -  07-26 @ 08:09  --------------------------------------------------------  IN: 400 mL / OUT: 400 mL / NET: 0 mL      CAPILLARY BLOOD GLUCOSE      POCT Blood Glucose.: 189 mg/dL (25 Jul 2023 15:52)    ECG:    PHYSICAL EXAM:  General: NAD  HEENT: Atraumatic, normocephalic  Neck: Supple, trachea midline  Respiratory: CTAB, no wheezes/rales/rhonchi  Cardiovascular: RRR, normal S1/S2, no murmurs/rubs/gallops  Abdomen: Soft, nontender, nondistended; bowel sounds present  Extremities: 2+ pulses b/l radial and dorsalis pedis; no clubbing/cyanosis/edema  SKIN: Warm, dry, normal color; no rashes or abnormal lesions   Neurological: AOx1    MEDICATIONS:  MEDICATIONS  (STANDING):  cefTRIAXone   IVPB 1000 milliGRAM(s) IV Intermittent every 24 hours  chlorhexidine 2% Cloths 1 Application(s) Topical daily  heparin   Injectable 5000 Unit(s) SubCutaneous every 8 hours  sodium chloride 0.9%. 1000 milliLiter(s) (100 mL/Hr) IV Continuous <Continuous>    MEDICATIONS  (PRN):      ALLERGIES:  Allergies    penicillins (Unknown)  amoxicillin (Unknown)  penicillin (Hives)    Intolerances        LABS:                        14.1   14.03 )-----------( 368      ( 25 Jul 2023 17:53 )             44.8     07-26    137  |  107  |  25<H>  ----------------------------<  116<H>  4.0   |  19<L>  |  3.60<H>    Ca    9.0      26 Jul 2023 00:13  Phos  2.0     07-26  Mg     1.80     07-26    TPro  8.2  /  Alb  5.0  /  TBili  0.5  /  DBili  x   /  AST  14  /  ALT  12  /  AlkPhos  88  07-25    PT/INR - ( 25 Jul 2023 18:00 )   PT: 12.3 sec;   INR: 1.10 ratio         PTT - ( 25 Jul 2023 18:00 )  PTT:22.1 sec  Urinalysis Basic - ( 26 Jul 2023 00:13 )    Color: x / Appearance: x / SG: x / pH: x  Gluc: 116 mg/dL / Ketone: x  / Bili: x / Urobili: x   Blood: x / Protein: x / Nitrite: x   Leuk Esterase: x / RBC: x / WBC x   Sq Epi: x / Non Sq Epi: x / Bacteria: x        RADIOLOGY & ADDITIONAL TESTS: Reviewed.

## 2023-07-26 NOTE — CONSULT NOTE ADULT - PROBLEM SELECTOR RECOMMENDATION 2
Pt. with h/o bipolar disorder on lithium. Lithium level was elevated to 5.3 on admission (7/25), and remains elevated at 5.2 today despite IV fluids. Plan for urgent HD, as outlined above. Continue with IVFs. Trend lithium level.    If you have any questions, please feel free to contact me  Michi Beltran  Nephrology Fellow  [phone] / [employer] Teams(Preferred)  (After 5pm or on weekends please page the on-call fellow)

## 2023-07-26 NOTE — H&P ADULT - NSHPPHYSICALEXAM_GEN_ALL_CORE
General: Patient in NAD  HEENT: NCAT, EOMI, PERRL, no oral lesions  CV: S1+S2, no m/r/g appreciated   Lungs: No respiratory distress, CTAB  Abd: Soft, nontender, no guarding, no rebound tenderness, + bowel sounds   : No suprapubic tenderness  Neuro: Alert and oriented to time, place and person. No focal deficits noted.   Ext: No cyanosis, no edema  Skin: No rash, no phlebitis General: Obtunded AOx1 to self  HEENT: NCAT, EOMI, PERRL, no oral lesions  CV: S1+S2, no m/r/g appreciated   Lungs: No respiratory distress, CTAB  Abd: Soft, nontender, no guarding, no rebound tenderness, + bowel sounds   : No suprapubic tenderness  Neuro: Alert and oriented to time, place and person. No focal deficits noted.   Ext: No cyanosis, no edema  Skin: No rash, no phlebitis

## 2023-07-26 NOTE — BH CONSULTATION LIAISON ASSESSMENT NOTE - ATTENDING COMMENTS
Chart reviewed, pt. seen/evaluated on 7/27, I agree with above assessment/plan. Patient alert, but non verbal and seems confused, unable to engage in interview. Care coordinated with pt.'s daughter Rozina at bedside, she reports that pt. overall was doing well from psychiatric perspective, however due to recent multiple stressors patient has been decompensating, pt. sees  NP Julia Castorena, compliant with medications and follow ups. She thinks patient took too much Lithium by mistake, she does not think it was a SA. Patient with no h/o SA, last inpatient admission was in 2016. She thinks patient will benefit from inpatient psych admission once she is medically optimized. Plan as above, case d/w MICU team in person. Will follow

## 2023-07-26 NOTE — ED PROCEDURE NOTE - NS ED PROC PERFORMED BY1 FT
[facility] Dr Vincent Linton.
Efrain

## 2023-07-26 NOTE — PROVIDER CONTACT NOTE (OTHER) - ASSESSMENT
Pt is obtunded, responsive to repeated stimuli briefly then goes back to appearing sleeping. Patient is making urine.

## 2023-07-26 NOTE — BH CONSULTATION LIAISON ASSESSMENT NOTE - DESCRIPTION
Pt is living w/ 2 daughters, 2 grandkids, boyfriend, and daughter's boyfriend in a 2 family home. 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 She has a live in boyfriend. Family and patient report she has a good support system.

## 2023-07-26 NOTE — ED PROCEDURE NOTE - ATTENDING CONTRIBUTION TO CARE
Dr. Mesa: I personally supervised this procedure performed by the resident and I agree with the above documentation.

## 2023-07-26 NOTE — CHART NOTE - NSCHARTNOTEFT_GEN_A_CORE
Spoke with outpatient psych NP Rfafaele and confirmed patient has hx of bipolar montserrat with psychosis. Patient was stable on 600 mg of lithium until May 2023 where she came to an appointment in a hypomanic state. At the time, patient complained of headaches and was taking Advil (unsure how much). Lithium levels drawn were 0.2. Lithium levels were rechecked on 5/26 and found to be 0.7. Patient reported dry mouth and no other complaints at the time. Patient was prescribed haldol Spoke with outpatient psych NP Raffaele and confirmed patient has hx of bipolar montserrat with psychosis. Patient was stable on 600 mg of lithium until May 2023 where she came to an appointment in a hypomanic state. At the time, patient complained of headaches and was taking Advil (unsure how much). Lithium levels drawn were 0.2. Lithium levels were rechecked on 5/26 and found to be 0.7. Patient reported dry mouth and no other complaints at the time. Patient was prescribed haldol 2.5 mg for paranoia.    Patient returned to clinic on 6/6 and was noted to be anxious. Denied any other sx's besides dry mouth and was observed to have returned to baseline. Lithium dosing was unchanged. Haldol dosing decreased to 1.25 mg daily.     ---    Per patient's daughter, patient was not taking any medications besides the lithium, flovent, and albuterol. Patient stopped taking haldol about 3 months ago.

## 2023-07-27 DIAGNOSIS — F05 DELIRIUM DUE TO KNOWN PHYSIOLOGICAL CONDITION: ICD-10-CM

## 2023-07-27 LAB
ALBUMIN SERPL ELPH-MCNC: 2.7 G/DL — LOW (ref 3.3–5)
ALP SERPL-CCNC: 69 U/L — SIGNIFICANT CHANGE UP (ref 40–120)
ALT FLD-CCNC: 12 U/L — SIGNIFICANT CHANGE UP (ref 4–33)
ANION GAP SERPL CALC-SCNC: 12 MMOL/L — SIGNIFICANT CHANGE UP (ref 7–14)
ANION GAP SERPL CALC-SCNC: 12 MMOL/L — SIGNIFICANT CHANGE UP (ref 7–14)
ANION GAP SERPL CALC-SCNC: 15 MMOL/L — HIGH (ref 7–14)
ANION GAP SERPL CALC-SCNC: 9 MMOL/L — SIGNIFICANT CHANGE UP (ref 7–14)
AST SERPL-CCNC: 15 U/L — SIGNIFICANT CHANGE UP (ref 4–32)
BILIRUB SERPL-MCNC: 0.3 MG/DL — SIGNIFICANT CHANGE UP (ref 0.2–1.2)
BLD GP AB SCN SERPL QL: NEGATIVE — SIGNIFICANT CHANGE UP
BUN SERPL-MCNC: 10 MG/DL — SIGNIFICANT CHANGE UP (ref 7–23)
BUN SERPL-MCNC: 10 MG/DL — SIGNIFICANT CHANGE UP (ref 7–23)
BUN SERPL-MCNC: 6 MG/DL — LOW (ref 7–23)
BUN SERPL-MCNC: 9 MG/DL — SIGNIFICANT CHANGE UP (ref 7–23)
CA-I BLD-SCNC: 0.97 MMOL/L — LOW (ref 1.15–1.29)
CALCIUM SERPL-MCNC: 6.8 MG/DL — LOW (ref 8.4–10.5)
CALCIUM SERPL-MCNC: 7.4 MG/DL — LOW (ref 8.4–10.5)
CALCIUM SERPL-MCNC: 8 MG/DL — LOW (ref 8.4–10.5)
CALCIUM SERPL-MCNC: 8.7 MG/DL — SIGNIFICANT CHANGE UP (ref 8.4–10.5)
CHLORIDE SERPL-SCNC: 107 MMOL/L — SIGNIFICANT CHANGE UP (ref 98–107)
CHLORIDE SERPL-SCNC: 108 MMOL/L — HIGH (ref 98–107)
CHLORIDE SERPL-SCNC: 110 MMOL/L — HIGH (ref 98–107)
CHLORIDE SERPL-SCNC: 112 MMOL/L — HIGH (ref 98–107)
CO2 SERPL-SCNC: 20 MMOL/L — LOW (ref 22–31)
CO2 SERPL-SCNC: 21 MMOL/L — LOW (ref 22–31)
CO2 SERPL-SCNC: 21 MMOL/L — LOW (ref 22–31)
CO2 SERPL-SCNC: 25 MMOL/L — SIGNIFICANT CHANGE UP (ref 22–31)
CREAT SERPL-MCNC: 0.72 MG/DL — SIGNIFICANT CHANGE UP (ref 0.5–1.3)
CREAT SERPL-MCNC: 0.85 MG/DL — SIGNIFICANT CHANGE UP (ref 0.5–1.3)
CREAT SERPL-MCNC: 1 MG/DL — SIGNIFICANT CHANGE UP (ref 0.5–1.3)
CREAT SERPL-MCNC: 1.01 MG/DL — SIGNIFICANT CHANGE UP (ref 0.5–1.3)
EGFR: 64 ML/MIN/1.73M2 — SIGNIFICANT CHANGE UP
EGFR: 64 ML/MIN/1.73M2 — SIGNIFICANT CHANGE UP
EGFR: 78 ML/MIN/1.73M2 — SIGNIFICANT CHANGE UP
EGFR: 96 ML/MIN/1.73M2 — SIGNIFICANT CHANGE UP
GLUCOSE SERPL-MCNC: 110 MG/DL — HIGH (ref 70–99)
GLUCOSE SERPL-MCNC: 110 MG/DL — HIGH (ref 70–99)
GLUCOSE SERPL-MCNC: 118 MG/DL — HIGH (ref 70–99)
GLUCOSE SERPL-MCNC: 93 MG/DL — SIGNIFICANT CHANGE UP (ref 70–99)
HCT VFR BLD CALC: 29.3 % — LOW (ref 34.5–45)
HCT VFR BLD CALC: 30.6 % — LOW (ref 34.5–45)
HGB BLD-MCNC: 10 G/DL — LOW (ref 11.5–15.5)
HGB BLD-MCNC: 9.2 G/DL — LOW (ref 11.5–15.5)
LITHIUM SERPL-MCNC: 0.9 MMOL/L — SIGNIFICANT CHANGE UP (ref 0.6–1.2)
LITHIUM SERPL-MCNC: 1.1 MMOL/L — SIGNIFICANT CHANGE UP (ref 0.6–1.2)
LITHIUM SERPL-MCNC: 1.3 MMOL/L — HIGH (ref 0.6–1.2)
MAGNESIUM SERPL-MCNC: 1.6 MG/DL — SIGNIFICANT CHANGE UP (ref 1.6–2.6)
MAGNESIUM SERPL-MCNC: 2.2 MG/DL — SIGNIFICANT CHANGE UP (ref 1.6–2.6)
MAGNESIUM SERPL-MCNC: 2.3 MG/DL — SIGNIFICANT CHANGE UP (ref 1.6–2.6)
MAGNESIUM SERPL-MCNC: 2.7 MG/DL — HIGH (ref 1.6–2.6)
MCHC RBC-ENTMCNC: 31.4 GM/DL — LOW (ref 32–36)
MCHC RBC-ENTMCNC: 31.6 PG — SIGNIFICANT CHANGE UP (ref 27–34)
MCHC RBC-ENTMCNC: 31.9 PG — SIGNIFICANT CHANGE UP (ref 27–34)
MCHC RBC-ENTMCNC: 32.7 GM/DL — SIGNIFICANT CHANGE UP (ref 32–36)
MCV RBC AUTO: 100.7 FL — HIGH (ref 80–100)
MCV RBC AUTO: 97.8 FL — SIGNIFICANT CHANGE UP (ref 80–100)
NRBC # BLD: 0 /100 WBCS — SIGNIFICANT CHANGE UP (ref 0–0)
NRBC # BLD: 0 /100 WBCS — SIGNIFICANT CHANGE UP (ref 0–0)
NRBC # FLD: 0 K/UL — SIGNIFICANT CHANGE UP (ref 0–0)
NRBC # FLD: 0 K/UL — SIGNIFICANT CHANGE UP (ref 0–0)
PHOSPHATE SERPL-MCNC: 1.5 MG/DL — LOW (ref 2.5–4.5)
PHOSPHATE SERPL-MCNC: 1.9 MG/DL — LOW (ref 2.5–4.5)
PHOSPHATE SERPL-MCNC: 2.5 MG/DL — SIGNIFICANT CHANGE UP (ref 2.5–4.5)
PHOSPHATE SERPL-MCNC: 2.6 MG/DL — SIGNIFICANT CHANGE UP (ref 2.5–4.5)
PLATELET # BLD AUTO: 146 K/UL — LOW (ref 150–400)
PLATELET # BLD AUTO: 150 K/UL — SIGNIFICANT CHANGE UP (ref 150–400)
POTASSIUM SERPL-MCNC: 3 MMOL/L — LOW (ref 3.5–5.3)
POTASSIUM SERPL-MCNC: 3.5 MMOL/L — SIGNIFICANT CHANGE UP (ref 3.5–5.3)
POTASSIUM SERPL-MCNC: 4 MMOL/L — SIGNIFICANT CHANGE UP (ref 3.5–5.3)
POTASSIUM SERPL-MCNC: 7.7 MMOL/L — CRITICAL HIGH (ref 3.5–5.3)
POTASSIUM SERPL-SCNC: 3 MMOL/L — LOW (ref 3.5–5.3)
POTASSIUM SERPL-SCNC: 3.5 MMOL/L — SIGNIFICANT CHANGE UP (ref 3.5–5.3)
POTASSIUM SERPL-SCNC: 4 MMOL/L — SIGNIFICANT CHANGE UP (ref 3.5–5.3)
POTASSIUM SERPL-SCNC: 7.7 MMOL/L — CRITICAL HIGH (ref 3.5–5.3)
PROT SERPL-MCNC: 5 G/DL — LOW (ref 6–8.3)
RBC # BLD: 2.91 M/UL — LOW (ref 3.8–5.2)
RBC # BLD: 3.13 M/UL — LOW (ref 3.8–5.2)
RBC # FLD: 14.6 % — HIGH (ref 10.3–14.5)
RBC # FLD: 14.6 % — HIGH (ref 10.3–14.5)
RH IG SCN BLD-IMP: POSITIVE — SIGNIFICANT CHANGE UP
RH IG SCN BLD-IMP: POSITIVE — SIGNIFICANT CHANGE UP
SODIUM SERPL-SCNC: 138 MMOL/L — SIGNIFICANT CHANGE UP (ref 135–145)
SODIUM SERPL-SCNC: 142 MMOL/L — SIGNIFICANT CHANGE UP (ref 135–145)
SODIUM SERPL-SCNC: 144 MMOL/L — SIGNIFICANT CHANGE UP (ref 135–145)
SODIUM SERPL-SCNC: 148 MMOL/L — HIGH (ref 135–145)
WBC # BLD: 14.68 K/UL — HIGH (ref 3.8–10.5)
WBC # BLD: 14.91 K/UL — HIGH (ref 3.8–10.5)
WBC # FLD AUTO: 14.68 K/UL — HIGH (ref 3.8–10.5)
WBC # FLD AUTO: 14.91 K/UL — HIGH (ref 3.8–10.5)

## 2023-07-27 PROCEDURE — 99223 1ST HOSP IP/OBS HIGH 75: CPT

## 2023-07-27 PROCEDURE — 99291 CRITICAL CARE FIRST HOUR: CPT | Mod: GC

## 2023-07-27 PROCEDURE — 99233 SBSQ HOSP IP/OBS HIGH 50: CPT | Mod: GC

## 2023-07-27 RX ORDER — CALCIUM GLUCONATE 100 MG/ML
2 VIAL (ML) INTRAVENOUS ONCE
Refills: 0 | Status: COMPLETED | OUTPATIENT
Start: 2023-07-27 | End: 2023-07-27

## 2023-07-27 RX ORDER — MAGNESIUM SULFATE 500 MG/ML
2 VIAL (ML) INJECTION ONCE
Refills: 0 | Status: COMPLETED | OUTPATIENT
Start: 2023-07-27 | End: 2023-07-27

## 2023-07-27 RX ORDER — ALBUTEROL 90 UG/1
10 AEROSOL, METERED ORAL ONCE
Refills: 0 | Status: DISCONTINUED | OUTPATIENT
Start: 2023-07-27 | End: 2023-07-27

## 2023-07-27 RX ORDER — POTASSIUM CHLORIDE 20 MEQ
10 PACKET (EA) ORAL
Refills: 0 | Status: COMPLETED | OUTPATIENT
Start: 2023-07-27 | End: 2023-07-27

## 2023-07-27 RX ORDER — SODIUM BICARBONATE 1 MEQ/ML
0.48 SYRINGE (ML) INTRAVENOUS
Qty: 150 | Refills: 0 | Status: DISCONTINUED | OUTPATIENT
Start: 2023-07-27 | End: 2023-07-27

## 2023-07-27 RX ORDER — POTASSIUM PHOSPHATE, MONOBASIC POTASSIUM PHOSPHATE, DIBASIC 236; 224 MG/ML; MG/ML
15 INJECTION, SOLUTION INTRAVENOUS ONCE
Refills: 0 | Status: COMPLETED | OUTPATIENT
Start: 2023-07-27 | End: 2023-07-27

## 2023-07-27 RX ORDER — DEXTROSE 50 % IN WATER 50 %
50 SYRINGE (ML) INTRAVENOUS ONCE
Refills: 0 | Status: DISCONTINUED | OUTPATIENT
Start: 2023-07-27 | End: 2023-07-27

## 2023-07-27 RX ORDER — DEXMEDETOMIDINE HYDROCHLORIDE IN 0.9% SODIUM CHLORIDE 4 UG/ML
0.2 INJECTION INTRAVENOUS
Qty: 400 | Refills: 0 | Status: DISCONTINUED | OUTPATIENT
Start: 2023-07-27 | End: 2023-07-31

## 2023-07-27 RX ADMIN — CEFTRIAXONE 100 MILLIGRAM(S): 500 INJECTION, POWDER, FOR SOLUTION INTRAMUSCULAR; INTRAVENOUS at 05:46

## 2023-07-27 RX ADMIN — Medication 100 MILLIEQUIVALENT(S): at 08:50

## 2023-07-27 RX ADMIN — HALOPERIDOL DECANOATE 2.5 MILLIGRAM(S): 100 INJECTION INTRAMUSCULAR at 18:00

## 2023-07-27 RX ADMIN — Medication 1 MILLIGRAM(S): at 19:42

## 2023-07-27 RX ADMIN — SODIUM CHLORIDE 100 MILLILITER(S): 9 INJECTION INTRAMUSCULAR; INTRAVENOUS; SUBCUTANEOUS at 05:46

## 2023-07-27 RX ADMIN — Medication 100 MILLIEQUIVALENT(S): at 06:32

## 2023-07-27 RX ADMIN — Medication 200 GRAM(S): at 11:45

## 2023-07-27 RX ADMIN — Medication 100 MILLIEQUIVALENT(S): at 07:43

## 2023-07-27 RX ADMIN — DEXMEDETOMIDINE HYDROCHLORIDE IN 0.9% SODIUM CHLORIDE 3.87 MICROGRAM(S)/KG/HR: 4 INJECTION INTRAVENOUS at 07:43

## 2023-07-27 RX ADMIN — SODIUM CHLORIDE 100 MILLILITER(S): 9 INJECTION INTRAMUSCULAR; INTRAVENOUS; SUBCUTANEOUS at 07:43

## 2023-07-27 RX ADMIN — HEPARIN SODIUM 5000 UNIT(S): 5000 INJECTION INTRAVENOUS; SUBCUTANEOUS at 05:09

## 2023-07-27 RX ADMIN — HEPARIN SODIUM 5000 UNIT(S): 5000 INJECTION INTRAVENOUS; SUBCUTANEOUS at 21:10

## 2023-07-27 RX ADMIN — Medication 25 GRAM(S): at 07:43

## 2023-07-27 RX ADMIN — DEXMEDETOMIDINE HYDROCHLORIDE IN 0.9% SODIUM CHLORIDE 3.87 MICROGRAM(S)/KG/HR: 4 INJECTION INTRAVENOUS at 05:46

## 2023-07-27 RX ADMIN — CHLORHEXIDINE GLUCONATE 1 APPLICATION(S): 213 SOLUTION TOPICAL at 11:12

## 2023-07-27 RX ADMIN — POTASSIUM PHOSPHATE, MONOBASIC POTASSIUM PHOSPHATE, DIBASIC 62.5 MILLIMOLE(S): 236; 224 INJECTION, SOLUTION INTRAVENOUS at 11:45

## 2023-07-27 RX ADMIN — HEPARIN SODIUM 5000 UNIT(S): 5000 INJECTION INTRAVENOUS; SUBCUTANEOUS at 13:53

## 2023-07-27 NOTE — DIETITIAN INITIAL EVALUATION ADULT - PERTINENT LABORATORY DATA
07-27    144  |  107  |  10  ----------------------------<  118<H>  4.0   |  25  |  1.01    Ca    8.0<L>      27 Jul 2023 09:58  Phos  2.5     07-27  Mg     2.70     07-27    TPro  5.0<L>  /  Alb  2.7<L>  /  TBili  0.3  /  DBili  x   /  AST  15  /  ALT  12  /  AlkPhos  69  07-27  A1C with Estimated Average Glucose Result: 5.6 % (09-22-22 @ 11:35)

## 2023-07-27 NOTE — DIETITIAN INITIAL EVALUATION ADULT - PERTINENT MEDS FT
MEDICATIONS  (STANDING):  cefTRIAXone   IVPB 1000 milliGRAM(s) IV Intermittent every 24 hours  chlorhexidine 2% Cloths 1 Application(s) Topical daily  dexMEDEtomidine Infusion 0.2 MICROgram(s)/kG/Hr (3.87 mL/Hr) IV Continuous <Continuous>  heparin   Injectable 5000 Unit(s) SubCutaneous every 8 hours  norepinephrine Infusion 0.05 MICROgram(s)/kG/Min (7.25 mL/Hr) IV Continuous <Continuous>  sodium chloride 0.9%. 1000 milliLiter(s) (100 mL/Hr) IV Continuous <Continuous>    MEDICATIONS  (PRN):  haloperidol    Injectable 2.5 milliGRAM(s) IntraMuscular every 6 hours PRN Agitation

## 2023-07-27 NOTE — DIETITIAN INITIAL EVALUATION ADULT - OTHER INFO
A&Ox1; obtunded. Currently NPO 2/2 mental status. No reported GI issues (nausea/vomiting/diarrhea/constipation.) NKFA. Unable to obtain UBW; HIE weight data unavailable.  A&Ox1; obtunded. Currently NPO 2/2 mental status. No reported GI issues (nausea/vomiting/diarrhea/constipation.) NKFA. Unable to obtain UBW; recent HIE weight data unavailable.

## 2023-07-27 NOTE — PROGRESS NOTE ADULT - SUBJECTIVE AND OBJECTIVE BOX
INTERVAL HPI/OVERNIGHT EVENTS:  No significant overnight events.    SUBJECTIVE:   Patient seen and examined at bedside.       VITAL SIGNS:  ICU Vital Signs Last 24 Hrs  T(C): 36.3 (27 Jul 2023 00:00), Max: 37.1 (26 Jul 2023 12:00)  T(F): 97.4 (27 Jul 2023 00:00), Max: 98.8 (26 Jul 2023 12:00)  HR: 65 (27 Jul 2023 07:00) (62 - 102)  BP: 153/77 (27 Jul 2023 07:00) (82/52 - 153/77)  BP(mean): 95 (27 Jul 2023 07:00) (53 - 97)  ABP: --  ABP(mean): --  RR: 14 (27 Jul 2023 07:00) (11 - 30)  SpO2: 99% (27 Jul 2023 07:00) (88% - 100%)    O2 Parameters below as of 27 Jul 2023 06:00  Patient On (Oxygen Delivery Method): room air            Plateau pressure:   P/F ratio:     07-26 @ 07:01  -  07-27 @ 07:00  --------------------------------------------------------  IN: 6610.1 mL / OUT: 2248 mL / NET: 4362.1 mL      CAPILLARY BLOOD GLUCOSE      POCT Blood Glucose.: 189 mg/dL (25 Jul 2023 15:52)    ECG:    PHYSICAL EXAM:  General: NAD  HEENT: Atraumatic, normocephalic  Neck: Supple, trachea midline  Respiratory: CTAB, no wheezes/rales/rhonchi  Cardiovascular: RRR, normal S1/S2, no murmurs/rubs/gallops  Abdomen: Soft, nontender, nondistended; bowel sounds present  Extremities: 2+ pulses b/l radial and dorsalis pedis; no clubbing/cyanosis/edema  SKIN: Warm, dry, normal color; no rashes or abnormal lesions   Neurological: AOx3. No FND’s. CN’s 2-12 grossly intact. Strength and sensation grossly intact.    MEDICATIONS:  MEDICATIONS  (STANDING):  cefTRIAXone   IVPB 1000 milliGRAM(s) IV Intermittent every 24 hours  chlorhexidine 2% Cloths 1 Application(s) Topical daily  dexMEDEtomidine Infusion 0.2 MICROgram(s)/kG/Hr (3.87 mL/Hr) IV Continuous <Continuous>  heparin   Injectable 5000 Unit(s) SubCutaneous every 8 hours  magnesium sulfate  IVPB 2 Gram(s) IV Intermittent once  norepinephrine Infusion 0.05 MICROgram(s)/kG/Min (7.25 mL/Hr) IV Continuous <Continuous>  potassium chloride  10 mEq/100 mL IVPB 10 milliEquivalent(s) IV Intermittent every 1 hour  sodium chloride 0.9%. 1000 milliLiter(s) (100 mL/Hr) IV Continuous <Continuous>    MEDICATIONS  (PRN):  haloperidol    Injectable 2.5 milliGRAM(s) IntraMuscular every 6 hours PRN Agitation      ALLERGIES:  Allergies    penicillins (Unknown)  amoxicillin (Unknown)  penicillin (Hives)    Intolerances        LABS:                        10.0   14.91 )-----------( 150      ( 27 Jul 2023 05:46 )             30.6     07-27    142  |  110<H>  |  6<L>  ----------------------------<  93  3.0<L>   |  20<L>  |  0.72    Ca    6.8<L>      27 Jul 2023 04:40  Phos  1.5     07-27  Mg     1.60     07-27    TPro  5.0<L>  /  Alb  2.7<L>  /  TBili  0.3  /  DBili  x   /  AST  15  /  ALT  12  /  AlkPhos  69  07-27    PT/INR - ( 25 Jul 2023 18:00 )   PT: 12.3 sec;   INR: 1.10 ratio         PTT - ( 25 Jul 2023 18:00 )  PTT:22.1 sec  Urinalysis Basic - ( 27 Jul 2023 04:40 )    Color: x / Appearance: x / SG: x / pH: x  Gluc: 93 mg/dL / Ketone: x  / Bili: x / Urobili: x   Blood: x / Protein: x / Nitrite: x   Leuk Esterase: x / RBC: x / WBC x   Sq Epi: x / Non Sq Epi: x / Bacteria: x        RADIOLOGY & ADDITIONAL TESTS: Reviewed. INTERVAL HPI/OVERNIGHT EVENTS:  Patient was started on precedex d/t agitation. Patient was hypotensive yesterday PM during HD (only received 40 min). Lithium persistently high to 3.5. HD was stopped and patient was resuscitated with 2 L LR bolus. Started on levophed and resumed HD x 6 hours ON. Most likely hypotensive d/t vasoplegic shock from precedex and hypovolemia.     SUBJECTIVE:   Patient seen and examined at bedside. She was easily ar      VITAL SIGNS:  ICU Vital Signs Last 24 Hrs  T(C): 36.3 (27 Jul 2023 00:00), Max: 37.1 (26 Jul 2023 12:00)  T(F): 97.4 (27 Jul 2023 00:00), Max: 98.8 (26 Jul 2023 12:00)  HR: 65 (27 Jul 2023 07:00) (62 - 102)  BP: 153/77 (27 Jul 2023 07:00) (82/52 - 153/77)  BP(mean): 95 (27 Jul 2023 07:00) (53 - 97)  ABP: --  ABP(mean): --  RR: 14 (27 Jul 2023 07:00) (11 - 30)  SpO2: 99% (27 Jul 2023 07:00) (88% - 100%)    O2 Parameters below as of 27 Jul 2023 06:00  Patient On (Oxygen Delivery Method): room air            Plateau pressure:   P/F ratio:     07-26 @ 07:01  -  07-27 @ 07:00  --------------------------------------------------------  IN: 6610.1 mL / OUT: 2248 mL / NET: 4362.1 mL      CAPILLARY BLOOD GLUCOSE      POCT Blood Glucose.: 189 mg/dL (25 Jul 2023 15:52)    ECG:    PHYSICAL EXAM:  General: NAD  HEENT: Atraumatic, normocephalic  Neck: Supple, trachea midline  Respiratory: CTAB, no wheezes/rales/rhonchi  Cardiovascular: RRR, normal S1/S2, no murmurs/rubs/gallops  Abdomen: Soft, nontender, nondistended; bowel sounds present  Extremities: 2+ pulses b/l radial and dorsalis pedis; no clubbing/cyanosis/edema  SKIN: Warm, dry, normal color; no rashes or abnormal lesions   Neurological: AOx3. No FND’s. CN’s 2-12 grossly intact. Strength and sensation grossly intact.    MEDICATIONS:  MEDICATIONS  (STANDING):  cefTRIAXone   IVPB 1000 milliGRAM(s) IV Intermittent every 24 hours  chlorhexidine 2% Cloths 1 Application(s) Topical daily  dexMEDEtomidine Infusion 0.2 MICROgram(s)/kG/Hr (3.87 mL/Hr) IV Continuous <Continuous>  heparin   Injectable 5000 Unit(s) SubCutaneous every 8 hours  magnesium sulfate  IVPB 2 Gram(s) IV Intermittent once  norepinephrine Infusion 0.05 MICROgram(s)/kG/Min (7.25 mL/Hr) IV Continuous <Continuous>  potassium chloride  10 mEq/100 mL IVPB 10 milliEquivalent(s) IV Intermittent every 1 hour  sodium chloride 0.9%. 1000 milliLiter(s) (100 mL/Hr) IV Continuous <Continuous>    MEDICATIONS  (PRN):  haloperidol    Injectable 2.5 milliGRAM(s) IntraMuscular every 6 hours PRN Agitation      ALLERGIES:  Allergies    penicillins (Unknown)  amoxicillin (Unknown)  penicillin (Hives)    Intolerances        LABS:                        10.0   14.91 )-----------( 150      ( 27 Jul 2023 05:46 )             30.6     07-27    142  |  110<H>  |  6<L>  ----------------------------<  93  3.0<L>   |  20<L>  |  0.72    Ca    6.8<L>      27 Jul 2023 04:40  Phos  1.5     07-27  Mg     1.60     07-27    TPro  5.0<L>  /  Alb  2.7<L>  /  TBili  0.3  /  DBili  x   /  AST  15  /  ALT  12  /  AlkPhos  69  07-27    PT/INR - ( 25 Jul 2023 18:00 )   PT: 12.3 sec;   INR: 1.10 ratio         PTT - ( 25 Jul 2023 18:00 )  PTT:22.1 sec  Urinalysis Basic - ( 27 Jul 2023 04:40 )    Color: x / Appearance: x / SG: x / pH: x  Gluc: 93 mg/dL / Ketone: x  / Bili: x / Urobili: x   Blood: x / Protein: x / Nitrite: x   Leuk Esterase: x / RBC: x / WBC x   Sq Epi: x / Non Sq Epi: x / Bacteria: x        RADIOLOGY & ADDITIONAL TESTS: Reviewed. INTERVAL HPI/OVERNIGHT EVENTS:  Patient was started on precedex d/t agitation. Patient was hypotensive yesterday PM during HD (only received 40 min). Lithium persistently high to 3.5. HD was stopped and patient was resuscitated with 2 L LR bolus. Started on levophed and resumed HD x 6 hours ON. Most likely hypotensive d/t vasoplegic shock from precedex and hypovolemia.     SUBJECTIVE:   Patient seen and examined at bedside. She was easily aroused from sleep but did not want to answer questions. She shook her head when prompted when asked about pain or discomfort.       VITAL SIGNS:  ICU Vital Signs Last 24 Hrs  T(C): 36.3 (27 Jul 2023 00:00), Max: 37.1 (26 Jul 2023 12:00)  T(F): 97.4 (27 Jul 2023 00:00), Max: 98.8 (26 Jul 2023 12:00)  HR: 65 (27 Jul 2023 07:00) (62 - 102)  BP: 153/77 (27 Jul 2023 07:00) (82/52 - 153/77)  BP(mean): 95 (27 Jul 2023 07:00) (53 - 97)  ABP: --  ABP(mean): --  RR: 14 (27 Jul 2023 07:00) (11 - 30)  SpO2: 99% (27 Jul 2023 07:00) (88% - 100%)    O2 Parameters below as of 27 Jul 2023 06:00  Patient On (Oxygen Delivery Method): room air            Plateau pressure:   P/F ratio:     07-26 @ 07:01  -  07-27 @ 07:00  --------------------------------------------------------  IN: 6610.1 mL / OUT: 2248 mL / NET: 4362.1 mL      CAPILLARY BLOOD GLUCOSE      POCT Blood Glucose.: 189 mg/dL (25 Jul 2023 15:52)    ECG:    PHYSICAL EXAM:  General: NAD  HEENT: Atraumatic, normocephalic  Neck: Supple, trachea midline  Respiratory: CTAB, no wheezes/rales/rhonchi  Cardiovascular: RRR, normal S1/S2, no murmurs/rubs/gallops  Abdomen: Soft, nontender, nondistended; bowel sounds present  Extremities: 2+ pulses b/l radial and dorsalis pedis; no clubbing/cyanosis/edema  SKIN: Warm, dry, normal color; no rashes or abnormal lesions   Neurological: AOx1; tremors in RUE resolved    MEDICATIONS:  MEDICATIONS  (STANDING):  cefTRIAXone   IVPB 1000 milliGRAM(s) IV Intermittent every 24 hours  chlorhexidine 2% Cloths 1 Application(s) Topical daily  dexMEDEtomidine Infusion 0.2 MICROgram(s)/kG/Hr (3.87 mL/Hr) IV Continuous <Continuous>  heparin   Injectable 5000 Unit(s) SubCutaneous every 8 hours  magnesium sulfate  IVPB 2 Gram(s) IV Intermittent once  norepinephrine Infusion 0.05 MICROgram(s)/kG/Min (7.25 mL/Hr) IV Continuous <Continuous>  potassium chloride  10 mEq/100 mL IVPB 10 milliEquivalent(s) IV Intermittent every 1 hour  sodium chloride 0.9%. 1000 milliLiter(s) (100 mL/Hr) IV Continuous <Continuous>    MEDICATIONS  (PRN):  haloperidol    Injectable 2.5 milliGRAM(s) IntraMuscular every 6 hours PRN Agitation      ALLERGIES:  Allergies    penicillins (Unknown)  amoxicillin (Unknown)  penicillin (Hives)    Intolerances        LABS:                        10.0   14.91 )-----------( 150      ( 27 Jul 2023 05:46 )             30.6     07-27    142  |  110<H>  |  6<L>  ----------------------------<  93  3.0<L>   |  20<L>  |  0.72    Ca    6.8<L>      27 Jul 2023 04:40  Phos  1.5     07-27  Mg     1.60     07-27    TPro  5.0<L>  /  Alb  2.7<L>  /  TBili  0.3  /  DBili  x   /  AST  15  /  ALT  12  /  AlkPhos  69  07-27    PT/INR - ( 25 Jul 2023 18:00 )   PT: 12.3 sec;   INR: 1.10 ratio         PTT - ( 25 Jul 2023 18:00 )  PTT:22.1 sec  Urinalysis Basic - ( 27 Jul 2023 04:40 )    Color: x / Appearance: x / SG: x / pH: x  Gluc: 93 mg/dL / Ketone: x  / Bili: x / Urobili: x   Blood: x / Protein: x / Nitrite: x   Leuk Esterase: x / RBC: x / WBC x   Sq Epi: x / Non Sq Epi: x / Bacteria: x        RADIOLOGY & ADDITIONAL TESTS: Reviewed.

## 2023-07-27 NOTE — PROGRESS NOTE ADULT - PROBLEM SELECTOR PLAN 2
Pt. with h/o bipolar disorder on lithium. Lithium level was elevated to 5.3 on admission (7/25), and decreased to 1.3 after multiple HD treatments on 7/26. Continue with IVFs. Trend lithium level. Will assess need for additional HD treatments pending lithium level.    If you have any questions, please feel free to contact me.  Florian Turner  Nephrology Fellow  [id number] / [phone] / [employer] Teams (Preferred)  (After 4pm or on weekends, please call the on-call Fellow)

## 2023-07-28 LAB
ALBUMIN SERPL ELPH-MCNC: 3 G/DL — LOW (ref 3.3–5)
ALBUMIN SERPL ELPH-MCNC: 3 G/DL — LOW (ref 3.3–5)
ALBUMIN SERPL ELPH-MCNC: 3.2 G/DL — LOW (ref 3.3–5)
ALBUMIN SERPL ELPH-MCNC: 3.4 G/DL — SIGNIFICANT CHANGE UP (ref 3.3–5)
ALP SERPL-CCNC: 75 U/L — SIGNIFICANT CHANGE UP (ref 40–120)
ALP SERPL-CCNC: 76 U/L — SIGNIFICANT CHANGE UP (ref 40–120)
ALP SERPL-CCNC: 79 U/L — SIGNIFICANT CHANGE UP (ref 40–120)
ALP SERPL-CCNC: 80 U/L — SIGNIFICANT CHANGE UP (ref 40–120)
ALT FLD-CCNC: 12 U/L — SIGNIFICANT CHANGE UP (ref 4–33)
ALT FLD-CCNC: 13 U/L — SIGNIFICANT CHANGE UP (ref 4–33)
ALT FLD-CCNC: 14 U/L — SIGNIFICANT CHANGE UP (ref 4–33)
ALT FLD-CCNC: 16 U/L — SIGNIFICANT CHANGE UP (ref 4–33)
ANION GAP SERPL CALC-SCNC: 10 MMOL/L — SIGNIFICANT CHANGE UP (ref 7–14)
ANION GAP SERPL CALC-SCNC: 10 MMOL/L — SIGNIFICANT CHANGE UP (ref 7–14)
ANION GAP SERPL CALC-SCNC: 11 MMOL/L — SIGNIFICANT CHANGE UP (ref 7–14)
ANION GAP SERPL CALC-SCNC: 12 MMOL/L — SIGNIFICANT CHANGE UP (ref 7–14)
ANION GAP SERPL CALC-SCNC: 7 MMOL/L — SIGNIFICANT CHANGE UP (ref 7–14)
AST SERPL-CCNC: 10 U/L — SIGNIFICANT CHANGE UP (ref 4–32)
AST SERPL-CCNC: 13 U/L — SIGNIFICANT CHANGE UP (ref 4–32)
AST SERPL-CCNC: 14 U/L — SIGNIFICANT CHANGE UP (ref 4–32)
AST SERPL-CCNC: 17 U/L — SIGNIFICANT CHANGE UP (ref 4–32)
BILIRUB SERPL-MCNC: 0.2 MG/DL — SIGNIFICANT CHANGE UP (ref 0.2–1.2)
BILIRUB SERPL-MCNC: 0.2 MG/DL — SIGNIFICANT CHANGE UP (ref 0.2–1.2)
BILIRUB SERPL-MCNC: 0.3 MG/DL — SIGNIFICANT CHANGE UP (ref 0.2–1.2)
BILIRUB SERPL-MCNC: 0.4 MG/DL — SIGNIFICANT CHANGE UP (ref 0.2–1.2)
BUN SERPL-MCNC: 10 MG/DL — SIGNIFICANT CHANGE UP (ref 7–23)
BUN SERPL-MCNC: 5 MG/DL — LOW (ref 7–23)
BUN SERPL-MCNC: 7 MG/DL — SIGNIFICANT CHANGE UP (ref 7–23)
BUN SERPL-MCNC: 8 MG/DL — SIGNIFICANT CHANGE UP (ref 7–23)
BUN SERPL-MCNC: 9 MG/DL — SIGNIFICANT CHANGE UP (ref 7–23)
CA-I BLD-SCNC: 1.16 MMOL/L — SIGNIFICANT CHANGE UP (ref 1.15–1.29)
CA-I BLD-SCNC: 1.24 MMOL/L — SIGNIFICANT CHANGE UP (ref 1.15–1.29)
CALCIUM SERPL-MCNC: 8.1 MG/DL — LOW (ref 8.4–10.5)
CALCIUM SERPL-MCNC: 8.2 MG/DL — LOW (ref 8.4–10.5)
CALCIUM SERPL-MCNC: 8.4 MG/DL — SIGNIFICANT CHANGE UP (ref 8.4–10.5)
CALCIUM SERPL-MCNC: 8.7 MG/DL — SIGNIFICANT CHANGE UP (ref 8.4–10.5)
CALCIUM SERPL-MCNC: 9.1 MG/DL — SIGNIFICANT CHANGE UP (ref 8.4–10.5)
CHLORIDE SERPL-SCNC: 109 MMOL/L — HIGH (ref 98–107)
CHLORIDE SERPL-SCNC: 110 MMOL/L — HIGH (ref 98–107)
CHLORIDE SERPL-SCNC: 113 MMOL/L — HIGH (ref 98–107)
CHLORIDE SERPL-SCNC: 114 MMOL/L — HIGH (ref 98–107)
CHLORIDE SERPL-SCNC: 120 MMOL/L — HIGH (ref 98–107)
CHLORIDE UR-SCNC: 37 MMOL/L — SIGNIFICANT CHANGE UP
CO2 SERPL-SCNC: 24 MMOL/L — SIGNIFICANT CHANGE UP (ref 22–31)
CO2 SERPL-SCNC: 24 MMOL/L — SIGNIFICANT CHANGE UP (ref 22–31)
CO2 SERPL-SCNC: 26 MMOL/L — SIGNIFICANT CHANGE UP (ref 22–31)
CREAT SERPL-MCNC: 0.81 MG/DL — SIGNIFICANT CHANGE UP (ref 0.5–1.3)
CREAT SERPL-MCNC: 0.94 MG/DL — SIGNIFICANT CHANGE UP (ref 0.5–1.3)
CREAT SERPL-MCNC: 1.08 MG/DL — SIGNIFICANT CHANGE UP (ref 0.5–1.3)
CREAT SERPL-MCNC: 1.08 MG/DL — SIGNIFICANT CHANGE UP (ref 0.5–1.3)
CREAT SERPL-MCNC: 1.17 MG/DL — SIGNIFICANT CHANGE UP (ref 0.5–1.3)
EGFR: 53 ML/MIN/1.73M2 — LOW
EGFR: 59 ML/MIN/1.73M2 — LOW
EGFR: 59 ML/MIN/1.73M2 — LOW
EGFR: 69 ML/MIN/1.73M2 — SIGNIFICANT CHANGE UP
EGFR: 83 ML/MIN/1.73M2 — SIGNIFICANT CHANGE UP
GLUCOSE SERPL-MCNC: 118 MG/DL — HIGH (ref 70–99)
GLUCOSE SERPL-MCNC: 119 MG/DL — HIGH (ref 70–99)
GLUCOSE SERPL-MCNC: 120 MG/DL — HIGH (ref 70–99)
GLUCOSE SERPL-MCNC: 138 MG/DL — HIGH (ref 70–99)
GLUCOSE SERPL-MCNC: 140 MG/DL — HIGH (ref 70–99)
HCT VFR BLD CALC: 29.3 % — LOW (ref 34.5–45)
HGB BLD-MCNC: 9.4 G/DL — LOW (ref 11.5–15.5)
LITHIUM SERPL-MCNC: 0.6 MMOL/L — SIGNIFICANT CHANGE UP (ref 0.6–1.2)
LITHIUM SERPL-MCNC: 0.7 MMOL/L — SIGNIFICANT CHANGE UP (ref 0.6–1.2)
LITHIUM SERPL-MCNC: 0.8 MMOL/L — SIGNIFICANT CHANGE UP (ref 0.6–1.2)
MAGNESIUM SERPL-MCNC: 2 MG/DL — SIGNIFICANT CHANGE UP (ref 1.6–2.6)
MAGNESIUM SERPL-MCNC: 2.1 MG/DL — SIGNIFICANT CHANGE UP (ref 1.6–2.6)
MAGNESIUM SERPL-MCNC: 2.2 MG/DL — SIGNIFICANT CHANGE UP (ref 1.6–2.6)
MAGNESIUM SERPL-MCNC: 2.3 MG/DL — SIGNIFICANT CHANGE UP (ref 1.6–2.6)
MAGNESIUM SERPL-MCNC: 2.3 MG/DL — SIGNIFICANT CHANGE UP (ref 1.6–2.6)
MCHC RBC-ENTMCNC: 31 PG — SIGNIFICANT CHANGE UP (ref 27–34)
MCHC RBC-ENTMCNC: 32.1 GM/DL — SIGNIFICANT CHANGE UP (ref 32–36)
MCV RBC AUTO: 96.7 FL — SIGNIFICANT CHANGE UP (ref 80–100)
NRBC # BLD: 0 /100 WBCS — SIGNIFICANT CHANGE UP (ref 0–0)
NRBC # FLD: 0 K/UL — SIGNIFICANT CHANGE UP (ref 0–0)
OSMOLALITY UR: 170 MOSM/KG — SIGNIFICANT CHANGE UP (ref 50–1200)
PHOSPHATE SERPL-MCNC: 2.2 MG/DL — LOW (ref 2.5–4.5)
PHOSPHATE SERPL-MCNC: 2.3 MG/DL — LOW (ref 2.5–4.5)
PHOSPHATE SERPL-MCNC: 2.4 MG/DL — LOW (ref 2.5–4.5)
PHOSPHATE SERPL-MCNC: 2.8 MG/DL — SIGNIFICANT CHANGE UP (ref 2.5–4.5)
PHOSPHATE SERPL-MCNC: 3.4 MG/DL — SIGNIFICANT CHANGE UP (ref 2.5–4.5)
PLATELET # BLD AUTO: 132 K/UL — LOW (ref 150–400)
POTASSIUM SERPL-MCNC: 3.6 MMOL/L — SIGNIFICANT CHANGE UP (ref 3.5–5.3)
POTASSIUM SERPL-MCNC: 3.7 MMOL/L — SIGNIFICANT CHANGE UP (ref 3.5–5.3)
POTASSIUM SERPL-MCNC: 3.7 MMOL/L — SIGNIFICANT CHANGE UP (ref 3.5–5.3)
POTASSIUM SERPL-MCNC: 3.8 MMOL/L — SIGNIFICANT CHANGE UP (ref 3.5–5.3)
POTASSIUM SERPL-MCNC: 3.9 MMOL/L — SIGNIFICANT CHANGE UP (ref 3.5–5.3)
POTASSIUM SERPL-SCNC: 3.6 MMOL/L — SIGNIFICANT CHANGE UP (ref 3.5–5.3)
POTASSIUM SERPL-SCNC: 3.7 MMOL/L — SIGNIFICANT CHANGE UP (ref 3.5–5.3)
POTASSIUM SERPL-SCNC: 3.7 MMOL/L — SIGNIFICANT CHANGE UP (ref 3.5–5.3)
POTASSIUM SERPL-SCNC: 3.8 MMOL/L — SIGNIFICANT CHANGE UP (ref 3.5–5.3)
POTASSIUM SERPL-SCNC: 3.9 MMOL/L — SIGNIFICANT CHANGE UP (ref 3.5–5.3)
POTASSIUM UR-SCNC: 9.4 MMOL/L — SIGNIFICANT CHANGE UP
PROT SERPL-MCNC: 5.7 G/DL — LOW (ref 6–8.3)
PROT SERPL-MCNC: 5.8 G/DL — LOW (ref 6–8.3)
PROT SERPL-MCNC: 6 G/DL — SIGNIFICANT CHANGE UP (ref 6–8.3)
PROT SERPL-MCNC: 6.3 G/DL — SIGNIFICANT CHANGE UP (ref 6–8.3)
RBC # BLD: 3.03 M/UL — LOW (ref 3.8–5.2)
RBC # FLD: 14.3 % — SIGNIFICANT CHANGE UP (ref 10.3–14.5)
SODIUM SERPL-SCNC: 145 MMOL/L — SIGNIFICANT CHANGE UP (ref 135–145)
SODIUM SERPL-SCNC: 147 MMOL/L — HIGH (ref 135–145)
SODIUM SERPL-SCNC: 149 MMOL/L — HIGH (ref 135–145)
SODIUM SERPL-SCNC: 150 MMOL/L — HIGH (ref 135–145)
SODIUM SERPL-SCNC: 151 MMOL/L — HIGH (ref 135–145)
SODIUM UR-SCNC: 61 MMOL/L — SIGNIFICANT CHANGE UP
WBC # BLD: 9.21 K/UL — SIGNIFICANT CHANGE UP (ref 3.8–10.5)
WBC # FLD AUTO: 9.21 K/UL — SIGNIFICANT CHANGE UP (ref 3.8–10.5)

## 2023-07-28 PROCEDURE — 99231 SBSQ HOSP IP/OBS SF/LOW 25: CPT

## 2023-07-28 PROCEDURE — 99233 SBSQ HOSP IP/OBS HIGH 50: CPT | Mod: GC

## 2023-07-28 PROCEDURE — 99291 CRITICAL CARE FIRST HOUR: CPT | Mod: GC

## 2023-07-28 RX ORDER — HALOPERIDOL DECANOATE 100 MG/ML
2.5 INJECTION INTRAMUSCULAR ONCE
Refills: 0 | Status: COMPLETED | OUTPATIENT
Start: 2023-07-28 | End: 2023-07-28

## 2023-07-28 RX ORDER — SODIUM CHLORIDE 9 MG/ML
1000 INJECTION, SOLUTION INTRAVENOUS
Refills: 0 | Status: DISCONTINUED | OUTPATIENT
Start: 2023-07-28 | End: 2023-07-28

## 2023-07-28 RX ORDER — HALOPERIDOL DECANOATE 100 MG/ML
2.5 INJECTION INTRAMUSCULAR THREE TIMES A DAY
Refills: 0 | Status: DISCONTINUED | OUTPATIENT
Start: 2023-07-28 | End: 2023-07-29

## 2023-07-28 RX ORDER — HALOPERIDOL DECANOATE 100 MG/ML
5 INJECTION INTRAMUSCULAR EVERY 6 HOURS
Refills: 0 | Status: DISCONTINUED | OUTPATIENT
Start: 2023-07-28 | End: 2023-08-04

## 2023-07-28 RX ORDER — POTASSIUM PHOSPHATE, MONOBASIC POTASSIUM PHOSPHATE, DIBASIC 236; 224 MG/ML; MG/ML
15 INJECTION, SOLUTION INTRAVENOUS ONCE
Refills: 0 | Status: COMPLETED | OUTPATIENT
Start: 2023-07-28 | End: 2023-07-28

## 2023-07-28 RX ORDER — SODIUM CHLORIDE 9 MG/ML
1000 INJECTION, SOLUTION INTRAVENOUS
Refills: 0 | Status: COMPLETED | OUTPATIENT
Start: 2023-07-28 | End: 2023-07-29

## 2023-07-28 RX ADMIN — SODIUM CHLORIDE 75 MILLILITER(S): 9 INJECTION, SOLUTION INTRAVENOUS at 11:22

## 2023-07-28 RX ADMIN — HEPARIN SODIUM 5000 UNIT(S): 5000 INJECTION INTRAVENOUS; SUBCUTANEOUS at 21:07

## 2023-07-28 RX ADMIN — DEXMEDETOMIDINE HYDROCHLORIDE IN 0.9% SODIUM CHLORIDE 3.87 MICROGRAM(S)/KG/HR: 4 INJECTION INTRAVENOUS at 07:19

## 2023-07-28 RX ADMIN — HALOPERIDOL DECANOATE 2.5 MILLIGRAM(S): 100 INJECTION INTRAMUSCULAR at 22:00

## 2023-07-28 RX ADMIN — SODIUM CHLORIDE 100 MILLILITER(S): 9 INJECTION INTRAMUSCULAR; INTRAVENOUS; SUBCUTANEOUS at 05:00

## 2023-07-28 RX ADMIN — CHLORHEXIDINE GLUCONATE 1 APPLICATION(S): 213 SOLUTION TOPICAL at 11:22

## 2023-07-28 RX ADMIN — HEPARIN SODIUM 5000 UNIT(S): 5000 INJECTION INTRAVENOUS; SUBCUTANEOUS at 05:01

## 2023-07-28 RX ADMIN — HALOPERIDOL DECANOATE 2.5 MILLIGRAM(S): 100 INJECTION INTRAMUSCULAR at 16:47

## 2023-07-28 RX ADMIN — SODIUM CHLORIDE 100 MILLILITER(S): 9 INJECTION, SOLUTION INTRAVENOUS at 17:53

## 2023-07-28 RX ADMIN — HALOPERIDOL DECANOATE 2.5 MILLIGRAM(S): 100 INJECTION INTRAMUSCULAR at 21:06

## 2023-07-28 RX ADMIN — HEPARIN SODIUM 5000 UNIT(S): 5000 INJECTION INTRAVENOUS; SUBCUTANEOUS at 14:40

## 2023-07-28 RX ADMIN — SODIUM CHLORIDE 100 MILLILITER(S): 9 INJECTION, SOLUTION INTRAVENOUS at 19:06

## 2023-07-28 RX ADMIN — CEFTRIAXONE 100 MILLIGRAM(S): 500 INJECTION, POWDER, FOR SOLUTION INTRAMUSCULAR; INTRAVENOUS at 06:30

## 2023-07-28 RX ADMIN — POTASSIUM PHOSPHATE, MONOBASIC POTASSIUM PHOSPHATE, DIBASIC 62.5 MILLIMOLE(S): 236; 224 INJECTION, SOLUTION INTRAVENOUS at 08:12

## 2023-07-28 RX ADMIN — SODIUM CHLORIDE 150 MILLILITER(S): 9 INJECTION, SOLUTION INTRAVENOUS at 23:41

## 2023-07-28 RX ADMIN — SODIUM CHLORIDE 100 MILLILITER(S): 9 INJECTION INTRAMUSCULAR; INTRAVENOUS; SUBCUTANEOUS at 07:19

## 2023-07-28 RX ADMIN — DEXMEDETOMIDINE HYDROCHLORIDE IN 0.9% SODIUM CHLORIDE 3.87 MICROGRAM(S)/KG/HR: 4 INJECTION INTRAVENOUS at 19:06

## 2023-07-28 NOTE — SWALLOW BEDSIDE ASSESSMENT ADULT - ADDITIONAL RECOMMENDATIONS
1. This service to follow up as schedule permits for possible diet advancement. 2. Reconsult as patient becomes medically optimized

## 2023-07-28 NOTE — BH CONSULTATION LIAISON PROGRESS NOTE - NSBHASSESSMENTFT_PSY_ALL_CORE
Patient is a 58F w Bipolar 1 who is here for urgent HD for Lithium toxicity a/w ALICJA/ATN c/b possible UTI. Patient is  but in a relationship, domiciled w/  daughter, her boyfriend, employed as a transport dispatch at [employer], w/ PPHx of bipolar d/o followed in [facility], w/ 4 prior psychiatric hospitalizations most recent one in 2016 at [facility] for "psychotic break" as per daughter, no known suicide attempt or violence hx, no known legal hx, no known substance abuse hx.   Spoke with daughter at bedside. Patient is sedated in ICU.  As per daughter, patient has a dx of bipolar disorder. she has a hx of inpatient admissions but has been doing well since 2016. Patient has been following outpatient with BLAYNE Castorena and compliant with medications.  Patient was recently on haldol and tapered off ( not for s/e , rather not longer psychotic). Has a previous bad s/e from a different antipsychotic but daughter cannot recall name. Daughter states patient has been stressed lately with her other daughter and having the responsibility of taking care of her grandchild, and still working. States patient is independent at baseline.     PLAN  - patient on CO for agitation - unable to assess SI/HI due to current state   - EKG - antipsychotics can only be given if qtc < 500   - agree with primary and tox - HD completed - lithium level 0.8  - continue to HOLD home lithium  - START standing Haldol 2.5mg TID for ongoing agitation and restlessness /delirium   - PRN haldol 5mg q6hrs for breakthrough agitation  - CL will follow

## 2023-07-28 NOTE — SWALLOW BEDSIDE ASSESSMENT ADULT - COMMENTS
MICU 7/28 "59yo Female with a PMHx of bipolar 1 disorder who is here for urgent HD for lithium toxicity. "    Patient seen at bedside this AM in the MICU for an initial assessment of the swallow function. RN present at bedside and reporting patient "spit out" water during dysphagia screen. Patient is with 1:1 PCA. Patient is able to gesture with head nodding wants/needs. Patient does not follow directives, though was receptive to PO trials. unable to adequately assess given patient accepted limited trials

## 2023-07-28 NOTE — PROGRESS NOTE ADULT - PROBLEM SELECTOR PLAN 2
Pt. with h/o bipolar disorder on lithium. Lithium level was elevated to 5.3 on admission (7/25), and decreased to 1.3 after multiple HD treatments on 7/26. Lithium level at 0.7 this AM. Continue with IVFs. Trend lithium level. No plan for additional HD treatments at this time.    If you have any questions, please feel free to contact me.  Florian Turner  Nephrology Fellow  [id number] / [phone] / [employer] Teams (Preferred)  (After 4pm or on weekends, please call the on-call Fellow)

## 2023-07-28 NOTE — PROGRESS NOTE ADULT - ASSESSMENT
BERTA FLORIAN is a 57yo Female with a PMHx of bipolar 1 disorder who is here for urgent HD for lithium toxicity.     NEURO:  #Mental status: altered likely 2/2 lithium toxicity.  #Bipolar disorder w/ montserrat   #[facility] Toxicity  - obtunded AOx1 only to person, baseline AOx4   - d/c precedex  - continue w/ Neuro checks for lithium toxicity  - psych consult pending     CV:  #Hemodynamically unstable:   - prev on levophed due to vasoplegic vs hypovolemic shock  - s/p 2 L LR bolus ON  - d/c levophed given HDS  - cont NS maintenance fluids    PULM:  - satting well on RA    RENAL:  #ALICJA  #Lithium toxicity  #Nephrogenic DI?  - s/p HD x 2 on 7/26 (2nd session net gain 500 mL)  - monitor I/O's  - rigo woo in place for possible HD    GI:  #Diet: NPO  - NPO for pending dysphagia screening for AMS  - has not eaten since Sunday  - consider NGT    ENDO:  - TSH wnl  - glucose 118    METABOLIC:  #Hypokalemia  - Trend electrolytes, replete if K > 4, Mg > 2, Phos > 3    HEMATOLOGIC:  #Anemia   - most likely dilutional   - no source of obvious bleed  #DVT prophylaxis with Heparin 5000U     ID:  # UTI  - UA positive for bacteruria LE positive nitrite negative  - Leukocytosis on admission 14.03  - Cr. elevated to 3.8, BUN 20  - Ucx Bcx pending  - Ceftriaxone 1g Daily 7/16-    SKIN:  #Lines: peripheral lines     Code Status: FULL CODE

## 2023-07-28 NOTE — PROGRESS NOTE ADULT - SUBJECTIVE AND OBJECTIVE BOX
[facility] Division of Kidney Diseases & Hypertension  FOLLOW UP NOTE  [phone]--------------------------------------------------------------------------------    HPI: 57 yo F with h/o bipolar disorder (on lithium) presenting with lethargy for 1 week with nausea, vomiting, diarrhea, and decreased PO intake. Nephrology was consulted for ALICJA and elevated lithium level. Lithium level was elevated to 5.3 on admission (7/25).    24 hour events/subjective: Pt. was seen and evaluated in the MICU this morning. Pt. more awake/alert but still with AMS, non-conversive. Unable to obtain ROS. Pt. had additional 4hr HD treatment as per Toxicology recommendations yesterday.    PAST HISTORY  --------------------------------------------------------------------------------  No significant changes to PMH, PSH, FHx, SHx, unless otherwise noted    ALLERGIES & MEDICATIONS  --------------------------------------------------------------------------------  Allergies  penicillins (Unknown)  amoxicillin (Unknown)  penicillin (Hives)    Intolerances    Standing Inpatient Medications  chlorhexidine 2% Cloths 1 Application(s) Topical daily  dexMEDEtomidine Infusion 0.2 MICROgram(s)/kG/Hr IV Continuous <Continuous>  heparin   Injectable 5000 Unit(s) SubCutaneous every 8 hours  potassium phosphate IVPB 15 milliMole(s) IV Intermittent once  sodium chloride 0.9%. 1000 milliLiter(s) IV Continuous <Continuous>    PRN Inpatient Medications  haloperidol    Injectable 2.5 milliGRAM(s) IntraMuscular every 6 hours PRN    REVIEW OF SYSTEMS  --------------------------------------------------------------------------------  Unable to obtain ROS due to clinical status.    VITALS/PHYSICAL EXAM  --------------------------------------------------------------------------------  T(C): 36.2 (07-28-23 @ 04:00), Max: 36.7 (07-27-23 @ 20:00)  HR: 67 (07-28-23 @ 07:00) (51 - 83)  BP: 151/68 (07-28-23 @ 07:00) (102/70 - 170/70)  RR: 16 (07-28-23 @ 07:00) (11 - 25)  SpO2: 100% (07-28-23 @ 07:00) (92% - 100%)  Wt(kg): --    07-27-23 @ 07:01  -  07-28-23 @ 07:00  --------------------------------------------------------  IN: 3974.1 mL / OUT: 4330 mL / NET: -355.9 mL    Physical Exam:  Gen: NAD, well-appearing  HEENT: NCAT, MMM  Pulm: CTA B/L  CV: S1S2, RRR  Abd: +BS, soft, nontender/nondistended  : No suprapubic tenderness, sierra  Extremities: no bilateral LE edema noted  Skin: Warm, without rashes    LABS/STUDIES  --------------------------------------------------------------------------------              9.4    9.21  >-----------<  132      [07-28-23 @ 00:25]              29.3     147  |  110  |  7   ----------------------------<  119      [07-28-23 @ 04:38]  3.7   |  26  |  0.94        Ca     8.2     [07-28-23 @ 04:38]      iCa    0.97     [07-27 @ 04:40]      Mg     2.10     [07-28-23 @ 04:38]      Phos  2.4     [07-28-23 @ 04:38]    TPro  5.8  /  Alb  3.0  /  TBili  0.3  /  DBili  x   /  AST  14  /  ALT  14  /  AlkPhos  76  [07-28-23 @ 04:38]    Creatinine Trend:  SCr 0.94 [07-28 @ 04:38]  SCr 0.81 [07-28 @ 00:25]  SCr 1.00 [07-27 @ 14:00]  SCr 1.01 [07-27 @ 09:58]  SCr 0.85 [07-27 @ 08:42]    Urinalysis - [07-28-23 @ 04:38]      Color  / Appearance  / SG  / pH       Gluc 119 / Ketone   / Bili  / Urobili        Blood  / Protein  / Leuk Est  / Nitrite       RBC  / WBC  / Hyaline  / Gran  / Sq Epi  / Non Sq Epi  / Bacteria     TSH 0.48      [07-25-23 @ 17:53]    HBsAb 17.2      [07-26-23 @ 05:30]  HBsAg Nonreact      [07-26-23 @ 05:30]  HBcAb Nonreact      [07-26-23 @ 05:30]

## 2023-07-28 NOTE — PROGRESS NOTE ADULT - SUBJECTIVE AND OBJECTIVE BOX
INTERVAL HPI/OVERNIGHT EVENTS:  No significant overnight events.    SUBJECTIVE:   Patient seen and examined at bedside.       VITAL SIGNS:  ICU Vital Signs Last 24 Hrs  T(C): 36.2 (28 Jul 2023 04:00), Max: 36.7 (27 Jul 2023 20:00)  T(F): 97.2 (28 Jul 2023 04:00), Max: 98 (27 Jul 2023 20:00)  HR: 58 (28 Jul 2023 06:00) (51 - 83)  BP: 139/69 (28 Jul 2023 06:00) (102/70 - 170/70)  BP(mean): 86 (28 Jul 2023 06:00) (58 - 108)  ABP: --  ABP(mean): --  RR: 13 (28 Jul 2023 06:00) (11 - 25)  SpO2: 100% (28 Jul 2023 06:00) (92% - 100%)    O2 Parameters below as of 28 Jul 2023 06:00  Patient On (Oxygen Delivery Method): room air            Plateau pressure:   P/F ratio:     07-27 @ 07:01  -  07-28 @ 07:00  --------------------------------------------------------  IN: 3863.5 mL / OUT: 4330 mL / NET: -466.5 mL      CAPILLARY BLOOD GLUCOSE        ECG:    PHYSICAL EXAM:  General: NAD  HEENT: Atraumatic, normocephalic  Neck: Supple, trachea midline  Respiratory: CTAB, no wheezes/rales/rhonchi  Cardiovascular: RRR, normal S1/S2, no murmurs/rubs/gallops  Abdomen: Soft, nontender, nondistended; bowel sounds present  Extremities: 2+ pulses b/l radial and dorsalis pedis; no clubbing/cyanosis/edema  SKIN: Warm, dry, normal color; no rashes or abnormal lesions   Neurological: AOx3. No FND’s. CN’s 2-12 grossly intact. Strength and sensation grossly intact.    MEDICATIONS:  MEDICATIONS  (STANDING):  chlorhexidine 2% Cloths 1 Application(s) Topical daily  dexMEDEtomidine Infusion 0.2 MICROgram(s)/kG/Hr (3.87 mL/Hr) IV Continuous <Continuous>  heparin   Injectable 5000 Unit(s) SubCutaneous every 8 hours  sodium chloride 0.9%. 1000 milliLiter(s) (100 mL/Hr) IV Continuous <Continuous>    MEDICATIONS  (PRN):  haloperidol    Injectable 2.5 milliGRAM(s) IntraMuscular every 6 hours PRN Agitation      ALLERGIES:  Allergies    penicillins (Unknown)  amoxicillin (Unknown)  penicillin (Hives)    Intolerances        LABS:                        9.4    9.21  )-----------( 132      ( 28 Jul 2023 00:25 )             29.3     07-28    147<H>  |  110<H>  |  7   ----------------------------<  119<H>  3.7   |  26  |  0.94    Ca    8.2<L>      28 Jul 2023 04:38  Phos  2.4     07-28  Mg     2.10     07-28    TPro  5.8<L>  /  Alb  3.0<L>  /  TBili  0.3  /  DBili  x   /  AST  14  /  ALT  14  /  AlkPhos  76  07-28      Urinalysis Basic - ( 28 Jul 2023 04:38 )    Color: x / Appearance: x / SG: x / pH: x  Gluc: 119 mg/dL / Ketone: x  / Bili: x / Urobili: x   Blood: x / Protein: x / Nitrite: x   Leuk Esterase: x / RBC: x / WBC x   Sq Epi: x / Non Sq Epi: x / Bacteria: x        RADIOLOGY & ADDITIONAL TESTS: Reviewed.

## 2023-07-29 LAB
-  AMIKACIN: SIGNIFICANT CHANGE UP
-  AMOXICILLIN/CLAVULANIC ACID: SIGNIFICANT CHANGE UP
-  AMPICILLIN/SULBACTAM: SIGNIFICANT CHANGE UP
-  AMPICILLIN: SIGNIFICANT CHANGE UP
-  AZTREONAM: SIGNIFICANT CHANGE UP
-  CEFAZOLIN: SIGNIFICANT CHANGE UP
-  CEFEPIME: SIGNIFICANT CHANGE UP
-  CEFOXITIN: SIGNIFICANT CHANGE UP
-  CEFTRIAXONE: SIGNIFICANT CHANGE UP
-  CEFUROXIME: SIGNIFICANT CHANGE UP
-  CIPROFLOXACIN: SIGNIFICANT CHANGE UP
-  ERTAPENEM: SIGNIFICANT CHANGE UP
-  GENTAMICIN: SIGNIFICANT CHANGE UP
-  IMIPENEM: SIGNIFICANT CHANGE UP
-  LEVOFLOXACIN: SIGNIFICANT CHANGE UP
-  MEROPENEM: SIGNIFICANT CHANGE UP
-  NITROFURANTOIN: SIGNIFICANT CHANGE UP
-  PIPERACILLIN/TAZOBACTAM: SIGNIFICANT CHANGE UP
-  TOBRAMYCIN: SIGNIFICANT CHANGE UP
-  TRIMETHOPRIM/SULFAMETHOXAZOLE: SIGNIFICANT CHANGE UP
ALBUMIN SERPL ELPH-MCNC: 3.1 G/DL — LOW (ref 3.3–5)
ALBUMIN SERPL ELPH-MCNC: 3.1 G/DL — LOW (ref 3.3–5)
ALBUMIN SERPL ELPH-MCNC: 3.3 G/DL — SIGNIFICANT CHANGE UP (ref 3.3–5)
ALP SERPL-CCNC: 73 U/L — SIGNIFICANT CHANGE UP (ref 40–120)
ALP SERPL-CCNC: 79 U/L — SIGNIFICANT CHANGE UP (ref 40–120)
ALP SERPL-CCNC: 83 U/L — SIGNIFICANT CHANGE UP (ref 40–120)
ALT FLD-CCNC: 16 U/L — SIGNIFICANT CHANGE UP (ref 4–33)
ALT FLD-CCNC: 20 U/L — SIGNIFICANT CHANGE UP (ref 4–33)
ALT FLD-CCNC: 7 U/L — SIGNIFICANT CHANGE UP (ref 4–33)
ANION GAP SERPL CALC-SCNC: 16 MMOL/L — HIGH (ref 7–14)
ANION GAP SERPL CALC-SCNC: 16 MMOL/L — HIGH (ref 7–14)
ANION GAP SERPL CALC-SCNC: 9 MMOL/L — SIGNIFICANT CHANGE UP (ref 7–14)
AST SERPL-CCNC: 12 U/L — SIGNIFICANT CHANGE UP (ref 4–32)
AST SERPL-CCNC: 17 U/L — SIGNIFICANT CHANGE UP (ref 4–32)
AST SERPL-CCNC: 21 U/L — SIGNIFICANT CHANGE UP (ref 4–32)
BILIRUB SERPL-MCNC: 0.2 MG/DL — SIGNIFICANT CHANGE UP (ref 0.2–1.2)
BILIRUB SERPL-MCNC: 0.2 MG/DL — SIGNIFICANT CHANGE UP (ref 0.2–1.2)
BILIRUB SERPL-MCNC: 0.3 MG/DL — SIGNIFICANT CHANGE UP (ref 0.2–1.2)
BUN SERPL-MCNC: 7 MG/DL — SIGNIFICANT CHANGE UP (ref 7–23)
BUN SERPL-MCNC: 7 MG/DL — SIGNIFICANT CHANGE UP (ref 7–23)
BUN SERPL-MCNC: 9 MG/DL — SIGNIFICANT CHANGE UP (ref 7–23)
CA-I BLD-SCNC: 1.2 MMOL/L — SIGNIFICANT CHANGE UP (ref 1.15–1.29)
CA-I BLD-SCNC: 1.24 MMOL/L — SIGNIFICANT CHANGE UP (ref 1.15–1.29)
CALCIUM SERPL-MCNC: 8.7 MG/DL — SIGNIFICANT CHANGE UP (ref 8.4–10.5)
CALCIUM SERPL-MCNC: 9 MG/DL — SIGNIFICANT CHANGE UP (ref 8.4–10.5)
CALCIUM SERPL-MCNC: 9.1 MG/DL — SIGNIFICANT CHANGE UP (ref 8.4–10.5)
CHLORIDE SERPL-SCNC: 114 MMOL/L — HIGH (ref 98–107)
CHLORIDE SERPL-SCNC: 115 MMOL/L — HIGH (ref 98–107)
CHLORIDE SERPL-SCNC: 118 MMOL/L — HIGH (ref 98–107)
CO2 SERPL-SCNC: 21 MMOL/L — LOW (ref 22–31)
CO2 SERPL-SCNC: 21 MMOL/L — LOW (ref 22–31)
CO2 SERPL-SCNC: 24 MMOL/L — SIGNIFICANT CHANGE UP (ref 22–31)
CREAT SERPL-MCNC: 1 MG/DL — SIGNIFICANT CHANGE UP (ref 0.5–1.3)
CREAT SERPL-MCNC: 1.03 MG/DL — SIGNIFICANT CHANGE UP (ref 0.5–1.3)
CREAT SERPL-MCNC: 1.14 MG/DL — SIGNIFICANT CHANGE UP (ref 0.5–1.3)
CULTURE RESULTS: SIGNIFICANT CHANGE UP
EGFR: 55 ML/MIN/1.73M2 — LOW
EGFR: 62 ML/MIN/1.73M2 — SIGNIFICANT CHANGE UP
EGFR: 64 ML/MIN/1.73M2 — SIGNIFICANT CHANGE UP
GLUCOSE SERPL-MCNC: 167 MG/DL — HIGH (ref 70–99)
GLUCOSE SERPL-MCNC: 176 MG/DL — HIGH (ref 70–99)
GLUCOSE SERPL-MCNC: 188 MG/DL — HIGH (ref 70–99)
HCT VFR BLD CALC: 30.5 % — LOW (ref 34.5–45)
HGB BLD-MCNC: 9.7 G/DL — LOW (ref 11.5–15.5)
LITHIUM SERPL-MCNC: 0.4 MMOL/L — LOW (ref 0.6–1.2)
LITHIUM SERPL-MCNC: 0.4 MMOL/L — LOW (ref 0.6–1.2)
LITHIUM SERPL-MCNC: 0.6 MMOL/L — SIGNIFICANT CHANGE UP (ref 0.6–1.2)
MAGNESIUM SERPL-MCNC: 2.3 MG/DL — SIGNIFICANT CHANGE UP (ref 1.6–2.6)
MAGNESIUM SERPL-MCNC: 2.4 MG/DL — SIGNIFICANT CHANGE UP (ref 1.6–2.6)
MAGNESIUM SERPL-MCNC: 2.8 MG/DL — HIGH (ref 1.6–2.6)
MCHC RBC-ENTMCNC: 31.8 GM/DL — LOW (ref 32–36)
MCHC RBC-ENTMCNC: 31.9 PG — SIGNIFICANT CHANGE UP (ref 27–34)
MCV RBC AUTO: 100.3 FL — HIGH (ref 80–100)
METHOD TYPE: SIGNIFICANT CHANGE UP
NRBC # BLD: 0 /100 WBCS — SIGNIFICANT CHANGE UP (ref 0–0)
NRBC # FLD: 0 K/UL — SIGNIFICANT CHANGE UP (ref 0–0)
ORGANISM # SPEC MICROSCOPIC CNT: SIGNIFICANT CHANGE UP
ORGANISM # SPEC MICROSCOPIC CNT: SIGNIFICANT CHANGE UP
PHOSPHATE SERPL-MCNC: 2.6 MG/DL — SIGNIFICANT CHANGE UP (ref 2.5–4.5)
PHOSPHATE SERPL-MCNC: 2.7 MG/DL — SIGNIFICANT CHANGE UP (ref 2.5–4.5)
PHOSPHATE SERPL-MCNC: 3.4 MG/DL — SIGNIFICANT CHANGE UP (ref 2.5–4.5)
PLATELET # BLD AUTO: 138 K/UL — LOW (ref 150–400)
POTASSIUM SERPL-MCNC: 3.4 MMOL/L — LOW (ref 3.5–5.3)
POTASSIUM SERPL-MCNC: 3.7 MMOL/L — SIGNIFICANT CHANGE UP (ref 3.5–5.3)
POTASSIUM SERPL-MCNC: 3.9 MMOL/L — SIGNIFICANT CHANGE UP (ref 3.5–5.3)
POTASSIUM SERPL-SCNC: 3.4 MMOL/L — LOW (ref 3.5–5.3)
POTASSIUM SERPL-SCNC: 3.7 MMOL/L — SIGNIFICANT CHANGE UP (ref 3.5–5.3)
POTASSIUM SERPL-SCNC: 3.9 MMOL/L — SIGNIFICANT CHANGE UP (ref 3.5–5.3)
PROT SERPL-MCNC: 5.8 G/DL — LOW (ref 6–8.3)
PROT SERPL-MCNC: 5.8 G/DL — LOW (ref 6–8.3)
PROT SERPL-MCNC: 6 G/DL — SIGNIFICANT CHANGE UP (ref 6–8.3)
RBC # BLD: 3.04 M/UL — LOW (ref 3.8–5.2)
RBC # FLD: 14.5 % — SIGNIFICANT CHANGE UP (ref 10.3–14.5)
SODIUM SERPL-SCNC: 151 MMOL/L — HIGH (ref 135–145)
SODIUM SERPL-SCNC: 151 MMOL/L — HIGH (ref 135–145)
SODIUM SERPL-SCNC: 152 MMOL/L — HIGH (ref 135–145)
SPECIMEN SOURCE: SIGNIFICANT CHANGE UP
WBC # BLD: 6.73 K/UL — SIGNIFICANT CHANGE UP (ref 3.8–10.5)
WBC # FLD AUTO: 6.73 K/UL — SIGNIFICANT CHANGE UP (ref 3.8–10.5)

## 2023-07-29 PROCEDURE — 99291 CRITICAL CARE FIRST HOUR: CPT | Mod: GC

## 2023-07-29 RX ORDER — HALOPERIDOL DECANOATE 100 MG/ML
5 INJECTION INTRAMUSCULAR THREE TIMES A DAY
Refills: 0 | Status: DISCONTINUED | OUTPATIENT
Start: 2023-07-29 | End: 2023-08-03

## 2023-07-29 RX ORDER — POTASSIUM CHLORIDE 20 MEQ
10 PACKET (EA) ORAL
Refills: 0 | Status: COMPLETED | OUTPATIENT
Start: 2023-07-29 | End: 2023-07-29

## 2023-07-29 RX ORDER — MAGNESIUM SULFATE 500 MG/ML
2 VIAL (ML) INJECTION ONCE
Refills: 0 | Status: COMPLETED | OUTPATIENT
Start: 2023-07-29 | End: 2023-07-29

## 2023-07-29 RX ORDER — SODIUM CHLORIDE 9 MG/ML
1000 INJECTION, SOLUTION INTRAVENOUS
Refills: 0 | Status: DISCONTINUED | OUTPATIENT
Start: 2023-07-29 | End: 2023-07-30

## 2023-07-29 RX ORDER — CHLOROTHIAZIDE 500 MG
250 TABLET ORAL ONCE
Refills: 0 | Status: COMPLETED | OUTPATIENT
Start: 2023-07-29 | End: 2023-07-29

## 2023-07-29 RX ORDER — POTASSIUM PHOSPHATE, MONOBASIC POTASSIUM PHOSPHATE, DIBASIC 236; 224 MG/ML; MG/ML
15 INJECTION, SOLUTION INTRAVENOUS ONCE
Refills: 0 | Status: COMPLETED | OUTPATIENT
Start: 2023-07-29 | End: 2023-07-29

## 2023-07-29 RX ADMIN — HALOPERIDOL DECANOATE 5 MILLIGRAM(S): 100 INJECTION INTRAMUSCULAR at 13:27

## 2023-07-29 RX ADMIN — DEXMEDETOMIDINE HYDROCHLORIDE IN 0.9% SODIUM CHLORIDE 3.87 MICROGRAM(S)/KG/HR: 4 INJECTION INTRAVENOUS at 19:51

## 2023-07-29 RX ADMIN — SODIUM CHLORIDE 175 MILLILITER(S): 9 INJECTION, SOLUTION INTRAVENOUS at 05:51

## 2023-07-29 RX ADMIN — HALOPERIDOL DECANOATE 5 MILLIGRAM(S): 100 INJECTION INTRAMUSCULAR at 21:49

## 2023-07-29 RX ADMIN — CHLORHEXIDINE GLUCONATE 1 APPLICATION(S): 213 SOLUTION TOPICAL at 15:10

## 2023-07-29 RX ADMIN — Medication 200 MILLIGRAM(S): at 22:10

## 2023-07-29 RX ADMIN — Medication 100 MILLIEQUIVALENT(S): at 22:28

## 2023-07-29 RX ADMIN — Medication 25 GRAM(S): at 13:27

## 2023-07-29 RX ADMIN — HALOPERIDOL DECANOATE 5 MILLIGRAM(S): 100 INJECTION INTRAMUSCULAR at 01:14

## 2023-07-29 RX ADMIN — POTASSIUM PHOSPHATE, MONOBASIC POTASSIUM PHOSPHATE, DIBASIC 41.67 MILLIMOLE(S): 236; 224 INJECTION, SOLUTION INTRAVENOUS at 18:03

## 2023-07-29 RX ADMIN — HEPARIN SODIUM 5000 UNIT(S): 5000 INJECTION INTRAVENOUS; SUBCUTANEOUS at 13:27

## 2023-07-29 RX ADMIN — HEPARIN SODIUM 5000 UNIT(S): 5000 INJECTION INTRAVENOUS; SUBCUTANEOUS at 05:55

## 2023-07-29 RX ADMIN — SODIUM CHLORIDE 175 MILLILITER(S): 9 INJECTION, SOLUTION INTRAVENOUS at 19:51

## 2023-07-29 RX ADMIN — Medication 100 MILLIEQUIVALENT(S): at 23:34

## 2023-07-29 RX ADMIN — HALOPERIDOL DECANOATE 2.5 MILLIGRAM(S): 100 INJECTION INTRAMUSCULAR at 05:55

## 2023-07-29 RX ADMIN — Medication 1 MILLIGRAM(S): at 02:14

## 2023-07-29 RX ADMIN — HEPARIN SODIUM 5000 UNIT(S): 5000 INJECTION INTRAVENOUS; SUBCUTANEOUS at 21:49

## 2023-07-29 NOTE — PROGRESS NOTE ADULT - SUBJECTIVE AND OBJECTIVE BOX
INTERVAL HPI/OVERNIGHT EVENTS:  No significant overnight events.    SUBJECTIVE:   Patient seen and examined at bedside.       VITAL SIGNS:  ICU Vital Signs Last 24 Hrs  T(C): 36.1 (29 Jul 2023 04:00), Max: 36.7 (29 Jul 2023 00:00)  T(F): 97 (29 Jul 2023 04:00), Max: 98 (29 Jul 2023 00:00)  HR: 60 (29 Jul 2023 06:00) (46 - 109)  BP: 134/65 (29 Jul 2023 06:00) (92/53 - 181/82)  BP(mean): 81 (29 Jul 2023 06:00) (63 - 108)  ABP: --  ABP(mean): --  RR: 18 (29 Jul 2023 06:00) (13 - 28)  SpO2: 99% (29 Jul 2023 06:00) (93% - 100%)    O2 Parameters below as of 29 Jul 2023 06:00  Patient On (Oxygen Delivery Method): room air            Plateau pressure:   P/F ratio:     07-28 @ 07:01  -  07-29 @ 07:00  --------------------------------------------------------  IN: 3060.4 mL / OUT: 4095 mL / NET: -1034.6 mL      CAPILLARY BLOOD GLUCOSE        ECG:    PHYSICAL EXAM:  General: NAD  HEENT: Atraumatic, normocephalic  Neck: Supple, trachea midline  Respiratory: CTAB, no wheezes/rales/rhonchi  Cardiovascular: RRR, normal S1/S2, no murmurs/rubs/gallops  Abdomen: Soft, nontender, nondistended; bowel sounds present  Extremities: 2+ pulses b/l radial and dorsalis pedis; no clubbing/cyanosis/edema  SKIN: Warm, dry, normal color; no rashes or abnormal lesions   Neurological: AOx3. No FND’s. CN’s 2-12 grossly intact. Strength and sensation grossly intact.    MEDICATIONS:  MEDICATIONS  (STANDING):  chlorhexidine 2% Cloths 1 Application(s) Topical daily  dexMEDEtomidine Infusion 0.2 MICROgram(s)/kG/Hr (3.87 mL/Hr) IV Continuous <Continuous>  dextrose 5%. 1000 milliLiter(s) (175 mL/Hr) IV Continuous <Continuous>  haloperidol    Injectable 2.5 milliGRAM(s) IntraMuscular three times a day  heparin   Injectable 5000 Unit(s) SubCutaneous every 8 hours    MEDICATIONS  (PRN):  haloperidol    Injectable 5 milliGRAM(s) IntraMuscular every 6 hours PRN Agitation      ALLERGIES:  Allergies    penicillins (Unknown)  amoxicillin (Unknown)  penicillin (Hives)    Intolerances        LABS:                        9.7    6.73  )-----------( 138      ( 29 Jul 2023 04:00 )             30.5     07-29    151<H>  |  118<H>  |  9   ----------------------------<  167<H>  3.9   |  24  |  1.14    Ca    8.7      29 Jul 2023 04:00  Phos  2.7     07-29  Mg     2.40     07-29    TPro  5.8<L>  /  Alb  3.1<L>  /  TBili  0.2  /  DBili  x   /  AST  12  /  ALT  7   /  AlkPhos  73  07-29      Urinalysis Basic - ( 29 Jul 2023 04:00 )    Color: x / Appearance: x / SG: x / pH: x  Gluc: 167 mg/dL / Ketone: x  / Bili: x / Urobili: x   Blood: x / Protein: x / Nitrite: x   Leuk Esterase: x / RBC: x / WBC x   Sq Epi: x / Non Sq Epi: x / Bacteria: x        RADIOLOGY & ADDITIONAL TESTS: Reviewed. INTERVAL HPI/OVERNIGHT EVENTS:  Patient was agitated overnight when precedex was being weaned. Tried to stand up with the restraints and bit the rail of the bed knocking out her teeth. She received her 2.5 mg haldol standing and another 5 mg haldol PRN for agitation. She remained agitated and got ativan which kept her calm through the rest of the night.     SUBJECTIVE:   Patient seen and examined at bedside. She remains altered in the AM and was unable to answer to questions. Remains agitated and tried to get up from the bed.       VITAL SIGNS:  ICU Vital Signs Last 24 Hrs  T(C): 36.1 (29 Jul 2023 04:00), Max: 36.7 (29 Jul 2023 00:00)  T(F): 97 (29 Jul 2023 04:00), Max: 98 (29 Jul 2023 00:00)  HR: 60 (29 Jul 2023 06:00) (46 - 109)  BP: 134/65 (29 Jul 2023 06:00) (92/53 - 181/82)  BP(mean): 81 (29 Jul 2023 06:00) (63 - 108)  ABP: --  ABP(mean): --  RR: 18 (29 Jul 2023 06:00) (13 - 28)  SpO2: 99% (29 Jul 2023 06:00) (93% - 100%)    O2 Parameters below as of 29 Jul 2023 06:00  Patient On (Oxygen Delivery Method): room air            Plateau pressure:   P/F ratio:     07-28 @ 07:01  -  07-29 @ 07:00  --------------------------------------------------------  IN: 3060.4 mL / OUT: 4095 mL / NET: -1034.6 mL      CAPILLARY BLOOD GLUCOSE        ECG:    PHYSICAL EXAM:  General: Drowsy  HEENT: Atraumatic, normocephalic; bottom two teeth missing; dried blood on the lips and mouth   Neck: Supple, trachea midline  Respiratory: CTAB, no wheezes/rales/rhonchi  Cardiovascular: RRR, normal S1/S2, no murmurs/rubs/gallops  Abdomen: Soft, nontender, nondistended; bowel sounds present  Extremities: Minimal edema in LE's b/l  SKIN: Warm, dry, normal color; no rashes or abnormal lesions   Neurological: AOx0    MEDICATIONS:  MEDICATIONS  (STANDING):  chlorhexidine 2% Cloths 1 Application(s) Topical daily  dexMEDEtomidine Infusion 0.2 MICROgram(s)/kG/Hr (3.87 mL/Hr) IV Continuous <Continuous>  dextrose 5%. 1000 milliLiter(s) (175 mL/Hr) IV Continuous <Continuous>  haloperidol    Injectable 2.5 milliGRAM(s) IntraMuscular three times a day  heparin   Injectable 5000 Unit(s) SubCutaneous every 8 hours    MEDICATIONS  (PRN):  haloperidol    Injectable 5 milliGRAM(s) IntraMuscular every 6 hours PRN Agitation      ALLERGIES:  Allergies    penicillins (Unknown)  amoxicillin (Unknown)  penicillin (Hives)    Intolerances        LABS:                        9.7    6.73  )-----------( 138      ( 29 Jul 2023 04:00 )             30.5     07-29    151<H>  |  118<H>  |  9   ----------------------------<  167<H>  3.9   |  24  |  1.14    Ca    8.7      29 Jul 2023 04:00  Phos  2.7     07-29  Mg     2.40     07-29    TPro  5.8<L>  /  Alb  3.1<L>  /  TBili  0.2  /  DBili  x   /  AST  12  /  ALT  7   /  AlkPhos  73  07-29      Urinalysis Basic - ( 29 Jul 2023 04:00 )    Color: x / Appearance: x / SG: x / pH: x  Gluc: 167 mg/dL / Ketone: x  / Bili: x / Urobili: x   Blood: x / Protein: x / Nitrite: x   Leuk Esterase: x / RBC: x / WBC x   Sq Epi: x / Non Sq Epi: x / Bacteria: x        RADIOLOGY & ADDITIONAL TESTS: Reviewed. INTERVAL HPI/OVERNIGHT EVENTS:  Patient was agitated overnight when precedex was being weaned. Tried to stand up with the restraints and bit the rail of the bed knocking out her teeth. She received her 2.5 mg haldol standing and another 5 mg haldol PRN for agitation. She remained agitated and got ativan which kept her calm through the rest of the night. Shiley was removed ON.     SUBJECTIVE:   Patient seen and examined at bedside. She remains altered in the AM and was unable to answer to questions. Remains agitated and tried to get up from the bed.       VITAL SIGNS:  ICU Vital Signs Last 24 Hrs  T(C): 36.1 (29 Jul 2023 04:00), Max: 36.7 (29 Jul 2023 00:00)  T(F): 97 (29 Jul 2023 04:00), Max: 98 (29 Jul 2023 00:00)  HR: 60 (29 Jul 2023 06:00) (46 - 109)  BP: 134/65 (29 Jul 2023 06:00) (92/53 - 181/82)  BP(mean): 81 (29 Jul 2023 06:00) (63 - 108)  ABP: --  ABP(mean): --  RR: 18 (29 Jul 2023 06:00) (13 - 28)  SpO2: 99% (29 Jul 2023 06:00) (93% - 100%)    O2 Parameters below as of 29 Jul 2023 06:00  Patient On (Oxygen Delivery Method): room air            Plateau pressure:   P/F ratio:     07-28 @ 07:01  -  07-29 @ 07:00  --------------------------------------------------------  IN: 3060.4 mL / OUT: 4095 mL / NET: -1034.6 mL      CAPILLARY BLOOD GLUCOSE        ECG:    PHYSICAL EXAM:  General: Drowsy  HEENT: Atraumatic, normocephalic; bottom two teeth missing; dried blood on the lips and mouth   Neck: Supple, trachea midline  Respiratory: CTAB, no wheezes/rales/rhonchi  Cardiovascular: RRR, normal S1/S2, no murmurs/rubs/gallops  Abdomen: Soft, nontender, nondistended; bowel sounds present  Extremities: Minimal edema in LE's b/l  SKIN: Warm, dry, normal color; no rashes or abnormal lesions   Neurological: AOx0    MEDICATIONS:  MEDICATIONS  (STANDING):  chlorhexidine 2% Cloths 1 Application(s) Topical daily  dexMEDEtomidine Infusion 0.2 MICROgram(s)/kG/Hr (3.87 mL/Hr) IV Continuous <Continuous>  dextrose 5%. 1000 milliLiter(s) (175 mL/Hr) IV Continuous <Continuous>  haloperidol    Injectable 2.5 milliGRAM(s) IntraMuscular three times a day  heparin   Injectable 5000 Unit(s) SubCutaneous every 8 hours    MEDICATIONS  (PRN):  haloperidol    Injectable 5 milliGRAM(s) IntraMuscular every 6 hours PRN Agitation      ALLERGIES:  Allergies    penicillins (Unknown)  amoxicillin (Unknown)  penicillin (Hives)    Intolerances        LABS:                        9.7    6.73  )-----------( 138      ( 29 Jul 2023 04:00 )             30.5     07-29    151<H>  |  118<H>  |  9   ----------------------------<  167<H>  3.9   |  24  |  1.14    Ca    8.7      29 Jul 2023 04:00  Phos  2.7     07-29  Mg     2.40     07-29    TPro  5.8<L>  /  Alb  3.1<L>  /  TBili  0.2  /  DBili  x   /  AST  12  /  ALT  7   /  AlkPhos  73  07-29      Urinalysis Basic - ( 29 Jul 2023 04:00 )    Color: x / Appearance: x / SG: x / pH: x  Gluc: 167 mg/dL / Ketone: x  / Bili: x / Urobili: x   Blood: x / Protein: x / Nitrite: x   Leuk Esterase: x / RBC: x / WBC x   Sq Epi: x / Non Sq Epi: x / Bacteria: x        RADIOLOGY & ADDITIONAL TESTS: Reviewed.

## 2023-07-29 NOTE — BH CONSULTATION LIAISON PROGRESS NOTE - NSBHASSESSMENTFT_PSY_ALL_CORE
Patient is a 58F w Bipolar 1 who is here for urgent HD for Lithium toxicity a/w ALICJA/ATN c/b possible UTI. Patient is  but in a relationship, domiciled w/  daughter, her boyfriend, employed as a transport dispatch at [employer], w/ PPHx of bipolar d/o followed in [facility], w/ 4 prior psychiatric hospitalizations most recent one in 2016 at [facility] for "psychotic break" as per daughter, no known suicide attempt or violence hx, no known legal hx, no known substance abuse hx.   Spoke with daughter at bedside. Patient is sedated in ICU.  As per daughter, patient has a dx of bipolar disorder. she has a hx of inpatient admissions but has been doing well since 2016. Patient has been following outpatient with BLAYNE Castorena and compliant with medications.  Patient was recently on haldol and tapered off ( not for s/e , rather not longer psychotic). Has a previous bad s/e from a different antipsychotic but daughter cannot recall name. Daughter states patient has been stressed lately with her other daughter and having the responsibility of taking care of her grandchild, and still working. States patient is independent at baseline.     7/29: Patient still delirious. Requiring PRNs for agitation. Unable to fully assess orientation. Continue 1:1.    PLAN  - patient on CO for agitation - unable to assess SI/HI due to current state   - EKG - antipsychotics can only be given if qtc < 500   - agree with primary and tox - HD completed - lithium level 0.8  - continue to HOLD home lithium  - c/w standing Haldol 2.5mg TID for ongoing agitation and restlessness /delirium   - PRN haldol 5mg q6hrs for breakthrough agitation  - CL will follow

## 2023-07-29 NOTE — PROGRESS NOTE ADULT - ASSESSMENT
BERTA FLORIAN is a 57yo Female with a PMHx of bipolar 1 disorder who is here for urgent HD for lithium toxicity.     NEURO:  #Mental status: altered likely 2/2 lithium toxicity.  #Bipolar disorder w/ montserrat   #[place] Toxicity  - obtunded AOx1 only to person, baseline AOx4   - d/c precedex  - continue w/ Neuro checks for lithium toxicity  - psych consult pending     CV:  #Hemodynamically unstable:   - prev on levophed due to vasoplegic vs hypovolemic shock  - s/p 2 L LR bolus ON  - d/c levophed given HDS  - cont NS maintenance fluids    PULM:  - satting well on RA    RENAL:  #ALICJA  #Lithium toxicity  #Nephrogenic DI?  - s/p HD x 2 on 7/26 (2nd session net gain 500 mL)  - monitor I/O's  - rigo woo in place for possible HD    GI:  #Diet: NPO  - NPO for pending dysphagia screening for AMS  - has not eaten since Sunday  - consider NGT    ENDO:  - TSH wnl  - glucose 118    METABOLIC:  #Hypokalemia  - Trend electrolytes, replete if K > 4, Mg > 2, Phos > 3    HEMATOLOGIC:  #Anemia   - most likely dilutional   - no source of obvious bleed  #DVT prophylaxis with Heparin 5000U     ID:  # UTI  - UA positive for bacteruria LE positive nitrite negative  - Leukocytosis on admission 14.03  - Cr. elevated to 3.8, BUN 20  - Ucx Bcx pending  - Ceftriaxone 1g Daily 7/16-    SKIN:  #Lines: peripheral lines     Code Status: FULL CODE   BERTA FLORIAN is a 59yo Female with a PMHx of bipolar 1 disorder who is here for urgent HD for lithium toxicity.     NEURO:  #Mental status: altered likely 2/2 lithium toxicity.  #Bipolar disorder w/ montserrat   #[place] Toxicity  - obtunded AOx0, baseline AOx4   - c/w precedex  - continue w/ Neuro checks for lithium toxicity  - psych consult recs appreciated;     CV:  #Hemodynamically unstable:   - prev on levophed due to vasoplegic vs hypovolemic shock  - s/p 2 L LR bolus ON  - d/c levophed given HDS  - cont NS maintenance fluids    PULM:  - satting well on RA    RENAL:  #ALICJA  #Lithium toxicity  #Nephrogenic DI?  - s/p HD x 2 on 7/26 (2nd session net gain 500 mL)  - monitor I/O's  - rigo woo in place for possible HD    GI:  #Diet: NPO  - NPO for pending dysphagia screening for AMS  - has not eaten since Sunday  - consider NGT    ENDO:  - TSH wnl  - glucose 118    METABOLIC:  #Hypokalemia  - Trend electrolytes, replete if K > 4, Mg > 2, Phos > 3    HEMATOLOGIC:  #Anemia   - most likely dilutional   - no source of obvious bleed  #DVT prophylaxis with Heparin 5000U     ID:  # UTI  - UA positive for bacteruria LE positive nitrite negative  - Leukocytosis on admission 14.03  - Cr. elevated to 3.8, BUN 20  - Ucx Bcx pending  - Ceftriaxone 1g Daily 7/16-    SKIN:  #Lines: peripheral lines     Code Status: FULL CODE   BERTA FLORIAN is a 59yo Female with a PMHx of bipolar 1 disorder who is here for urgent HD for lithium toxicity.     NEURO:  #Mental status: altered likely 2/2 lithium toxicity.  #Bipolar disorder w/ montserrat   #[place] Toxicity  - obtunded AOx0, baseline AOx4   - c/w precedex  - continue w/ Neuro checks for lithium toxicity  - psych consult recs appreciated  - increased haldol 5 mg TID standing; haldol 5 mg PRN for agitation   - consider adding benzo if agitation continues     CV:  #Hemodynamically stable:   - prev on levophed due to vasoplegic vs hypovolemic shock  - cont D5 fluids (increased rate to 175)    PULM:  - satting well on RA    RENAL:  #ALICJA  #Lithium toxicity  #Nephrogenic DI  - s/p HD x 3 on 7/26-27  - monitor I/O's  - rigo   - shiley removed ON  - started HCTZ 12.5 mg daily    GI:  #Diet: Pureed   -     ENDO:  - TSH wnl  - glucose 118    METABOLIC:  #Hypokalemia  - Trend electrolytes, replete if K > 4, Mg > 2, Phos > 3    HEMATOLOGIC:  #Anemia   - most likely dilutional   - no source of obvious bleed  #DVT prophylaxis with Heparin 5000U     ID:  # UTI  - UA positive for bacteruria LE positive nitrite negative  - urine culture positive for e. coli and coag neg staph  - blood culture no growth   - completed ceftriaxone 1g (7/26-28)  - leukocytosis resolved; afebrile     SKIN:  #Lines: peripheral lines     Code Status: FULL CODE

## 2023-07-29 NOTE — CHART NOTE - NSCHARTNOTEFT_GEN_A_CORE
Notified by RN that Pt observed intermittently biting guardrail overnight and now found with blood in her oral cavity. Pt seen at bedside. She is agitated, A&Ox0-1, moaning, intermittently kicking at staff. Haldol administered prn and Precedex up-titrated. Despite sedation, Pt uncooperative w/ full physical exam: Head normocephalic, atraumatic, neck supple with full passive range of motion, no ecchymosis, edema, signs of trauma. Oropharyngeal exam limited 2/2 Pt agitation, biting aggressively at staff upon approach of her mouth. With limited exam, dried blood observed on her lips, lower lateral incisor is absent, and small blood clot partially obscured surrounding dentition. No foreign body seen in her oropharynx. Observed upper teeth appeared intact. Pt protecting airway, Vital Signs Stable. Will follow up behavioral health recs in am, consider Dental evaluation as able. Will continue to monitor. -Regular diet  -mIVF

## 2023-07-30 LAB
ALBUMIN SERPL ELPH-MCNC: 2.7 G/DL — LOW (ref 3.3–5)
ALBUMIN SERPL ELPH-MCNC: 3.2 G/DL — LOW (ref 3.3–5)
ALBUMIN SERPL ELPH-MCNC: 3.3 G/DL — SIGNIFICANT CHANGE UP (ref 3.3–5)
ALP SERPL-CCNC: 110 U/L — SIGNIFICANT CHANGE UP (ref 40–120)
ALP SERPL-CCNC: 83 U/L — SIGNIFICANT CHANGE UP (ref 40–120)
ALP SERPL-CCNC: 88 U/L — SIGNIFICANT CHANGE UP (ref 40–120)
ALT FLD-CCNC: 23 U/L — SIGNIFICANT CHANGE UP (ref 4–33)
ALT FLD-CCNC: 65 U/L — HIGH (ref 4–33)
ALT FLD-CCNC: 85 U/L — HIGH (ref 4–33)
ANION GAP SERPL CALC-SCNC: 12 MMOL/L — SIGNIFICANT CHANGE UP (ref 7–14)
ANION GAP SERPL CALC-SCNC: 12 MMOL/L — SIGNIFICANT CHANGE UP (ref 7–14)
ANION GAP SERPL CALC-SCNC: 17 MMOL/L — HIGH (ref 7–14)
AST SERPL-CCNC: 104 U/L — HIGH (ref 4–32)
AST SERPL-CCNC: 24 U/L — SIGNIFICANT CHANGE UP (ref 4–32)
AST SERPL-CCNC: 54 U/L — HIGH (ref 4–32)
BILIRUB SERPL-MCNC: 0.2 MG/DL — SIGNIFICANT CHANGE UP (ref 0.2–1.2)
BILIRUB SERPL-MCNC: 0.3 MG/DL — SIGNIFICANT CHANGE UP (ref 0.2–1.2)
BILIRUB SERPL-MCNC: 0.3 MG/DL — SIGNIFICANT CHANGE UP (ref 0.2–1.2)
BUN SERPL-MCNC: 5 MG/DL — LOW (ref 7–23)
BUN SERPL-MCNC: 6 MG/DL — LOW (ref 7–23)
BUN SERPL-MCNC: 6 MG/DL — LOW (ref 7–23)
CA-I BLD-SCNC: 1.02 MMOL/L — LOW (ref 1.15–1.29)
CALCIUM SERPL-MCNC: 6.8 MG/DL — LOW (ref 8.4–10.5)
CALCIUM SERPL-MCNC: 9 MG/DL — SIGNIFICANT CHANGE UP (ref 8.4–10.5)
CALCIUM SERPL-MCNC: 9 MG/DL — SIGNIFICANT CHANGE UP (ref 8.4–10.5)
CHLORIDE SERPL-SCNC: 111 MMOL/L — HIGH (ref 98–107)
CHLORIDE SERPL-SCNC: 116 MMOL/L — HIGH (ref 98–107)
CHLORIDE SERPL-SCNC: 78 MMOL/L — LOW (ref 98–107)
CO2 SERPL-SCNC: 15 MMOL/L — LOW (ref 22–31)
CO2 SERPL-SCNC: 15 MMOL/L — LOW (ref 22–31)
CO2 SERPL-SCNC: 18 MMOL/L — LOW (ref 22–31)
CREAT SERPL-MCNC: 0.83 MG/DL — SIGNIFICANT CHANGE UP (ref 0.5–1.3)
CREAT SERPL-MCNC: 1.01 MG/DL — SIGNIFICANT CHANGE UP (ref 0.5–1.3)
CREAT SERPL-MCNC: 1.02 MG/DL — SIGNIFICANT CHANGE UP (ref 0.5–1.3)
EGFR: 63 ML/MIN/1.73M2 — SIGNIFICANT CHANGE UP
EGFR: 64 ML/MIN/1.73M2 — SIGNIFICANT CHANGE UP
EGFR: 81 ML/MIN/1.73M2 — SIGNIFICANT CHANGE UP
GLUCOSE SERPL-MCNC: 1172 MG/DL — CRITICAL HIGH (ref 70–99)
GLUCOSE SERPL-MCNC: 162 MG/DL — HIGH (ref 70–99)
GLUCOSE SERPL-MCNC: 190 MG/DL — HIGH (ref 70–99)
HCT VFR BLD CALC: 32.2 % — LOW (ref 34.5–45)
HGB BLD-MCNC: 10.2 G/DL — LOW (ref 11.5–15.5)
LITHIUM SERPL-MCNC: 0.2 MMOL/L — LOW (ref 0.6–1.2)
LITHIUM SERPL-MCNC: 0.3 MMOL/L — LOW (ref 0.6–1.2)
LITHIUM SERPL-MCNC: 0.3 MMOL/L — LOW (ref 0.6–1.2)
MAGNESIUM SERPL-MCNC: 1.3 MG/DL — LOW (ref 1.6–2.6)
MAGNESIUM SERPL-MCNC: 1.9 MG/DL — SIGNIFICANT CHANGE UP (ref 1.6–2.6)
MAGNESIUM SERPL-MCNC: 2.4 MG/DL — SIGNIFICANT CHANGE UP (ref 1.6–2.6)
MCHC RBC-ENTMCNC: 31.5 PG — SIGNIFICANT CHANGE UP (ref 27–34)
MCHC RBC-ENTMCNC: 31.7 GM/DL — LOW (ref 32–36)
MCV RBC AUTO: 99.4 FL — SIGNIFICANT CHANGE UP (ref 80–100)
NRBC # BLD: 0 /100 WBCS — SIGNIFICANT CHANGE UP (ref 0–0)
NRBC # FLD: 0 K/UL — SIGNIFICANT CHANGE UP (ref 0–0)
PHOSPHATE SERPL-MCNC: 2.6 MG/DL — SIGNIFICANT CHANGE UP (ref 2.5–4.5)
PHOSPHATE SERPL-MCNC: 3.3 MG/DL — SIGNIFICANT CHANGE UP (ref 2.5–4.5)
PHOSPHATE SERPL-MCNC: 3.5 MG/DL — SIGNIFICANT CHANGE UP (ref 2.5–4.5)
PLATELET # BLD AUTO: 148 K/UL — LOW (ref 150–400)
POTASSIUM SERPL-MCNC: 2.7 MMOL/L — CRITICAL LOW (ref 3.5–5.3)
POTASSIUM SERPL-MCNC: 3.8 MMOL/L — SIGNIFICANT CHANGE UP (ref 3.5–5.3)
POTASSIUM SERPL-MCNC: 3.9 MMOL/L — SIGNIFICANT CHANGE UP (ref 3.5–5.3)
POTASSIUM SERPL-SCNC: 2.7 MMOL/L — CRITICAL LOW (ref 3.5–5.3)
POTASSIUM SERPL-SCNC: 3.8 MMOL/L — SIGNIFICANT CHANGE UP (ref 3.5–5.3)
POTASSIUM SERPL-SCNC: 3.9 MMOL/L — SIGNIFICANT CHANGE UP (ref 3.5–5.3)
PROT SERPL-MCNC: 5 G/DL — LOW (ref 6–8.3)
PROT SERPL-MCNC: 6.2 G/DL — SIGNIFICANT CHANGE UP (ref 6–8.3)
PROT SERPL-MCNC: 6.3 G/DL — SIGNIFICANT CHANGE UP (ref 6–8.3)
RBC # BLD: 3.24 M/UL — LOW (ref 3.8–5.2)
RBC # FLD: 14.5 % — SIGNIFICANT CHANGE UP (ref 10.3–14.5)
SODIUM SERPL-SCNC: 105 MMOL/L — CRITICAL LOW (ref 135–145)
SODIUM SERPL-SCNC: 143 MMOL/L — SIGNIFICANT CHANGE UP (ref 135–145)
SODIUM SERPL-SCNC: 146 MMOL/L — HIGH (ref 135–145)
WBC # BLD: 7.24 K/UL — SIGNIFICANT CHANGE UP (ref 3.8–10.5)
WBC # FLD AUTO: 7.24 K/UL — SIGNIFICANT CHANGE UP (ref 3.8–10.5)

## 2023-07-30 PROCEDURE — 99291 CRITICAL CARE FIRST HOUR: CPT | Mod: GC

## 2023-07-30 PROCEDURE — 71045 X-RAY EXAM CHEST 1 VIEW: CPT | Mod: 26

## 2023-07-30 RX ORDER — SODIUM CHLORIDE 9 MG/ML
1000 INJECTION, SOLUTION INTRAVENOUS
Refills: 0 | Status: COMPLETED | OUTPATIENT
Start: 2023-07-30 | End: 2023-07-30

## 2023-07-30 RX ORDER — SODIUM CHLORIDE 9 MG/ML
1000 INJECTION, SOLUTION INTRAVENOUS
Refills: 0 | Status: DISCONTINUED | OUTPATIENT
Start: 2023-07-30 | End: 2023-07-31

## 2023-07-30 RX ORDER — POTASSIUM CHLORIDE 20 MEQ
10 PACKET (EA) ORAL
Refills: 0 | Status: COMPLETED | OUTPATIENT
Start: 2023-07-30 | End: 2023-07-30

## 2023-07-30 RX ORDER — CHLOROTHIAZIDE 500 MG
250 TABLET ORAL
Refills: 0 | Status: DISCONTINUED | OUTPATIENT
Start: 2023-07-30 | End: 2023-07-31

## 2023-07-30 RX ADMIN — Medication 100 MILLIEQUIVALENT(S): at 10:38

## 2023-07-30 RX ADMIN — HALOPERIDOL DECANOATE 5 MILLIGRAM(S): 100 INJECTION INTRAMUSCULAR at 14:59

## 2023-07-30 RX ADMIN — Medication 0.5 MILLIGRAM(S): at 19:56

## 2023-07-30 RX ADMIN — HALOPERIDOL DECANOATE 5 MILLIGRAM(S): 100 INJECTION INTRAMUSCULAR at 08:52

## 2023-07-30 RX ADMIN — HALOPERIDOL DECANOATE 5 MILLIGRAM(S): 100 INJECTION INTRAMUSCULAR at 22:10

## 2023-07-30 RX ADMIN — Medication 0.5 MILLIGRAM(S): at 10:05

## 2023-07-30 RX ADMIN — SODIUM CHLORIDE 175 MILLILITER(S): 9 INJECTION, SOLUTION INTRAVENOUS at 06:29

## 2023-07-30 RX ADMIN — DEXMEDETOMIDINE HYDROCHLORIDE IN 0.9% SODIUM CHLORIDE 3.87 MICROGRAM(S)/KG/HR: 4 INJECTION INTRAVENOUS at 06:29

## 2023-07-30 RX ADMIN — DEXMEDETOMIDINE HYDROCHLORIDE IN 0.9% SODIUM CHLORIDE 3.87 MICROGRAM(S)/KG/HR: 4 INJECTION INTRAVENOUS at 07:34

## 2023-07-30 RX ADMIN — Medication 200 MILLIGRAM(S): at 17:39

## 2023-07-30 RX ADMIN — HALOPERIDOL DECANOATE 5 MILLIGRAM(S): 100 INJECTION INTRAMUSCULAR at 06:25

## 2023-07-30 RX ADMIN — Medication 100 MILLIEQUIVALENT(S): at 07:33

## 2023-07-30 RX ADMIN — CHLORHEXIDINE GLUCONATE 1 APPLICATION(S): 213 SOLUTION TOPICAL at 12:47

## 2023-07-30 RX ADMIN — SODIUM CHLORIDE 175 MILLILITER(S): 9 INJECTION, SOLUTION INTRAVENOUS at 19:53

## 2023-07-30 RX ADMIN — HEPARIN SODIUM 5000 UNIT(S): 5000 INJECTION INTRAVENOUS; SUBCUTANEOUS at 15:00

## 2023-07-30 RX ADMIN — SODIUM CHLORIDE 175 MILLILITER(S): 9 INJECTION, SOLUTION INTRAVENOUS at 07:34

## 2023-07-30 RX ADMIN — Medication 100 MILLIEQUIVALENT(S): at 09:01

## 2023-07-30 RX ADMIN — Medication 100 MILLIEQUIVALENT(S): at 00:33

## 2023-07-30 RX ADMIN — DEXMEDETOMIDINE HYDROCHLORIDE IN 0.9% SODIUM CHLORIDE 3.87 MICROGRAM(S)/KG/HR: 4 INJECTION INTRAVENOUS at 19:53

## 2023-07-30 RX ADMIN — HEPARIN SODIUM 5000 UNIT(S): 5000 INJECTION INTRAVENOUS; SUBCUTANEOUS at 06:25

## 2023-07-30 RX ADMIN — HEPARIN SODIUM 5000 UNIT(S): 5000 INJECTION INTRAVENOUS; SUBCUTANEOUS at 22:10

## 2023-07-30 NOTE — PROGRESS NOTE ADULT - SUBJECTIVE AND OBJECTIVE BOX
Patient is a 58y old  Female who presents with a chief complaint of AMS (29 Jul 2023 07:00)    HPI:  Dede Flores is a 58-year-old female with a past medical history of bipolar on lithium who presents emergency department on 7/25 for 1 week of lethargy and fatigue..  Per patient's daughter who is bedside, the patient has not been responsive or talking to her since earlier in the day. Patient went to outpatient [facility] and was referred to [facility] for evaluation. Per patient's family member, the the patient has not taken her medications since two days prior to admission due to nausea, vomiting.  Patient's family member states she has been nauseous and having multiple episodes of diarrhea for the last week.  Patient has not had a p.o. intake in over 24 hours.  Patient denies any other recent illnesses.  Blood work at ED significant for Lithium level 5.3 (0.6-1.2 normal), Cr. 3.81, WBC 14, blood glucose 168 UA showing leukocytes, LE, Nitrites negative, Bacteria, Ketones, Calcium Oxalate Crystals.  CT head and CXR WNL.   Admit to MICU for urgent hemodialysis    (26 Jul 2023 00:22)    PAST MEDICAL & SURGICAL HISTORY:  Bipolar 1 disorder    No significant past surgical history  MEDICATIONS  (STANDING):  chlorhexidine 2% Cloths 1 Application(s) Topical daily  dexMEDEtomidine Infusion 0.2 MICROgram(s)/kG/Hr (3.87 mL/Hr) IV Continuous <Continuous>  dextrose 5%. 1000 milliLiter(s) (175 mL/Hr) IV Continuous <Continuous>  haloperidol    Injectable 5 milliGRAM(s) IntraMuscular three times a day  heparin   Injectable 5000 Unit(s) SubCutaneous every 8 hours  hydrochlorothiazide 12.5 milliGRAM(s) Oral daily  LORazepam   Injectable 0.5 milliGRAM(s) IV Push two times a day    MEDICATIONS  (PRN):  haloperidol    Injectable 5 milliGRAM(s) IntraMuscular every 6 hours PRN Agitation    Allergies  penicillins (Unknown)  amoxicillin (Unknown)  penicillin (Hives)  Intolerances    FAMILY HISTORY:  *SOCIAL HISTORY: (guardian or who pt came with), (smoking hx)    *DENTAL RADIOGRAPHS: No Rads were taken at this time due to patient's mental status and not having the ability to transport.     RADIOLOGY & ADDITIONAL STUDIES: No Rads to Interpret.     ASSESSMENT:  Teeth #'s 22-24 are missing due to trauma from patient biting the bed rail. The sockets of teeth #'s 22-24 appears to be hemostatic and healing normally. No signs of infections appear to be present from the photo sent from the medical team. Dental Informed the Medical team to take a chest x-ray to ensure there is no risk of aspiration of avulsed teeth. Medical team understood and agreed. Also, informed the medical team to page in patient Adult Clinic once patient is mentally stable. Today the patient was unable answer dental related questions due to mental status.     PROCEDURE:  Examined the patient via Photo from Teams sent by Medical Team. Informed the medical team that due to their not being a risk of aspiration or any signs of infections we would defer a dental exam until the patient can give consent and is mentally stable. Medical Team agreed.     RECOMMENDATIONS:  1) The Med Team should obtain a chest x-ray to ensure the missing teeth were not swallowed and pose a risk of aspiration  2) Dental F/U with in patient Adult Clinic once mental status has improved  3) If any difficulty swallowing/breathing, fever occur, page dental.     Guillermo Marin, DDS [id number]  [facility] [id number]

## 2023-07-30 NOTE — PROGRESS NOTE ADULT - SUBJECTIVE AND OBJECTIVE BOX
INTERVAL HPI/OVERNIGHT EVENTS:  Agitated this morning when precedex down to 0.7. Given PRN Haldol 5    SUBJECTIVE:   Patient seen and examined at bedside. She remains altered in the AM and was unable to answer to questions. Remains agitated and tried to get up from the bed.       VITAL SIGNS:  ICU Vital Signs Last 24 Hrs  T(C): 36.1 (29 Jul 2023 04:00), Max: 36.7 (29 Jul 2023 00:00)  T(F): 97 (29 Jul 2023 04:00), Max: 98 (29 Jul 2023 00:00)  HR: 60 (29 Jul 2023 06:00) (46 - 109)  BP: 134/65 (29 Jul 2023 06:00) (92/53 - 181/82)  BP(mean): 81 (29 Jul 2023 06:00) (63 - 108)  ABP: --  ABP(mean): --  RR: 18 (29 Jul 2023 06:00) (13 - 28)  SpO2: 99% (29 Jul 2023 06:00) (93% - 100%)    O2 Parameters below as of 29 Jul 2023 06:00  Patient On (Oxygen Delivery Method): room air            Plateau pressure:   P/F ratio:     07-28 @ 07:01  -  07-29 @ 07:00  --------------------------------------------------------  IN: 3060.4 mL / OUT: 4095 mL / NET: -1034.6 mL      CAPILLARY BLOOD GLUCOSE        ECG:    PHYSICAL EXAM:  General: Drowsy  HEENT: Atraumatic, normocephalic; bottom two teeth missing; dried blood on the lips and mouth   Neck: Supple, trachea midline  Respiratory: CTAB, no wheezes/rales/rhonchi  Cardiovascular: RRR, normal S1/S2, no murmurs/rubs/gallops  Abdomen: Soft, nontender, nondistended; bowel sounds present  Extremities: Minimal edema in LE's b/l  SKIN: Warm, dry, normal color; no rashes or abnormal lesions   Neurological: AOx0    MEDICATIONS:  MEDICATIONS  (STANDING):  chlorhexidine 2% Cloths 1 Application(s) Topical daily  dexMEDEtomidine Infusion 0.2 MICROgram(s)/kG/Hr (3.87 mL/Hr) IV Continuous <Continuous>  dextrose 5%. 1000 milliLiter(s) (175 mL/Hr) IV Continuous <Continuous>  haloperidol    Injectable 2.5 milliGRAM(s) IntraMuscular three times a day  heparin   Injectable 5000 Unit(s) SubCutaneous every 8 hours    MEDICATIONS  (PRN):  haloperidol    Injectable 5 milliGRAM(s) IntraMuscular every 6 hours PRN Agitation      ALLERGIES:  Allergies    penicillins (Unknown)  amoxicillin (Unknown)  penicillin (Hives)    Intolerances        LABS:                        9.7    6.73  )-----------( 138      ( 29 Jul 2023 04:00 )             30.5     07-29    151<H>  |  118<H>  |  9   ----------------------------<  167<H>  3.9   |  24  |  1.14    Ca    8.7      29 Jul 2023 04:00  Phos  2.7     07-29  Mg     2.40     07-29    TPro  5.8<L>  /  Alb  3.1<L>  /  TBili  0.2  /  DBili  x   /  AST  12  /  ALT  7   /  AlkPhos  73  07-29      Urinalysis Basic - ( 29 Jul 2023 04:00 )    Color: x / Appearance: x / SG: x / pH: x  Gluc: 167 mg/dL / Ketone: x  / Bili: x / Urobili: x   Blood: x / Protein: x / Nitrite: x   Leuk Esterase: x / RBC: x / WBC x   Sq Epi: x / Non Sq Epi: x / Bacteria: x        RADIOLOGY & ADDITIONAL TESTS: Reviewed.

## 2023-07-30 NOTE — PROGRESS NOTE ADULT - ASSESSMENT
· Assessment	  BERTA FLORIAN is a 57yo Female with a PMHx of bipolar 1 disorder who is here for urgent HD for lithium toxicity.     NEURO:  #Mental status: altered likely 2/2 lithium toxicity.  #Bipolar disorder w/ montserrat   #Lithium Toxicity  - AOx1, baseline AOx4   - c/w precedex, titrate down   - continue w/ Neuro checks for lithium toxicity  - psych consult recs appreciated  - haldol 5 mg TID standing; haldol 5 mg PRN for agitation   - Ativan 0.5 BID added    CV:  #Hemodynamically stable:   - prev on levophed due to vasoplegic vs hypovolemic shock  - cont D5 fluids (increased rate to 175)    PULM:  - satting well on RA    RENAL:  #ALICJA  #Lithium toxicity  #Nephrogenic DI  - s/p HD x 3 on 7/26-27  - monitor I/O's  - rigo woo removed   - started Chlorothiazide 250BID   - Na improving; replenish K+ and Mg as needed    GI:  #Diet: Pureed   #Tooth loss   - Patient lost bottom teeth on 7/28 night after biting bed rail agitation   - Dental consulted; recommend CXR to rule out aspiration or swallowed tooth, no acute intervention at this time   - Re-consult Adult inpatient dental when mental status stable.     ENDO:  - TSH wnl  - glucose 118    METABOLIC:  #Hypokalemia  #Hypernatremia; improving  - Trend electrolytes, replete if K > 4, Mg > 2, Phos > 3  - Cont free water   - Started chlorothiazide      HEMATOLOGIC:  #Anemia   - most likely dilutional; improving   - no source of obvious bleed  #DVT prophylaxis with Heparin 5000U     ID:  # UTI  - UA positive for bacteruria LE positive nitrite negative  - urine culture positive for e. coli and coag neg staph  - blood culture no growth   - completed ceftriaxone 1g (7/26-28)  - leukocytosis resolved; afebrile     SKIN:  #Lines: peripheral lines     Code Status: FULL CODE

## 2023-07-30 NOTE — PROGRESS NOTE ADULT - CRITICAL CARE ATTENDING COMMENT
Critically ill patient requiring frequent bedside visits with therapy changes.
reviewing chart and coordinating care with primary team/staff, as well as reviewing vitals, radiology, medication list, recent labs, and prior records.
Critically ill patient requiring frequent bedside visits with therapy changes.

## 2023-07-31 DIAGNOSIS — E23.2 DIABETES INSIPIDUS: ICD-10-CM

## 2023-07-31 LAB
ALBUMIN SERPL ELPH-MCNC: 3.3 G/DL — SIGNIFICANT CHANGE UP (ref 3.3–5)
ALBUMIN SERPL ELPH-MCNC: 3.6 G/DL — SIGNIFICANT CHANGE UP (ref 3.3–5)
ALBUMIN SERPL ELPH-MCNC: 3.9 G/DL — SIGNIFICANT CHANGE UP (ref 3.3–5)
ALP SERPL-CCNC: 109 U/L — SIGNIFICANT CHANGE UP (ref 40–120)
ALP SERPL-CCNC: 119 U/L — SIGNIFICANT CHANGE UP (ref 40–120)
ALP SERPL-CCNC: 134 U/L — HIGH (ref 40–120)
ALT FLD-CCNC: 71 U/L — HIGH (ref 4–33)
ALT FLD-CCNC: 72 U/L — HIGH (ref 4–33)
ALT FLD-CCNC: 86 U/L — HIGH (ref 4–33)
ANION GAP SERPL CALC-SCNC: 11 MMOL/L — SIGNIFICANT CHANGE UP (ref 7–14)
ANION GAP SERPL CALC-SCNC: 13 MMOL/L — SIGNIFICANT CHANGE UP (ref 7–14)
ANION GAP SERPL CALC-SCNC: 14 MMOL/L — SIGNIFICANT CHANGE UP (ref 7–14)
AST SERPL-CCNC: 39 U/L — HIGH (ref 4–32)
AST SERPL-CCNC: 41 U/L — HIGH (ref 4–32)
AST SERPL-CCNC: 56 U/L — HIGH (ref 4–32)
BILIRUB SERPL-MCNC: 0.3 MG/DL — SIGNIFICANT CHANGE UP (ref 0.2–1.2)
BILIRUB SERPL-MCNC: 0.3 MG/DL — SIGNIFICANT CHANGE UP (ref 0.2–1.2)
BILIRUB SERPL-MCNC: 0.4 MG/DL — SIGNIFICANT CHANGE UP (ref 0.2–1.2)
BUN SERPL-MCNC: 7 MG/DL — SIGNIFICANT CHANGE UP (ref 7–23)
BUN SERPL-MCNC: 7 MG/DL — SIGNIFICANT CHANGE UP (ref 7–23)
BUN SERPL-MCNC: 9 MG/DL — SIGNIFICANT CHANGE UP (ref 7–23)
CA-I BLD-SCNC: 1.2 MMOL/L — SIGNIFICANT CHANGE UP (ref 1.15–1.29)
CA-I BLD-SCNC: 1.23 MMOL/L — SIGNIFICANT CHANGE UP (ref 1.15–1.29)
CALCIUM SERPL-MCNC: 8.6 MG/DL — SIGNIFICANT CHANGE UP (ref 8.4–10.5)
CALCIUM SERPL-MCNC: 9.1 MG/DL — SIGNIFICANT CHANGE UP (ref 8.4–10.5)
CALCIUM SERPL-MCNC: 9.4 MG/DL — SIGNIFICANT CHANGE UP (ref 8.4–10.5)
CHLORIDE SERPL-SCNC: 107 MMOL/L — SIGNIFICANT CHANGE UP (ref 98–107)
CHLORIDE SERPL-SCNC: 112 MMOL/L — HIGH (ref 98–107)
CHLORIDE SERPL-SCNC: 112 MMOL/L — HIGH (ref 98–107)
CO2 SERPL-SCNC: 18 MMOL/L — LOW (ref 22–31)
CO2 SERPL-SCNC: 20 MMOL/L — LOW (ref 22–31)
CO2 SERPL-SCNC: 20 MMOL/L — LOW (ref 22–31)
CREAT SERPL-MCNC: 1.09 MG/DL — SIGNIFICANT CHANGE UP (ref 0.5–1.3)
CREAT SERPL-MCNC: 1.11 MG/DL — SIGNIFICANT CHANGE UP (ref 0.5–1.3)
CREAT SERPL-MCNC: 1.16 MG/DL — SIGNIFICANT CHANGE UP (ref 0.5–1.3)
CULTURE RESULTS: SIGNIFICANT CHANGE UP
CULTURE RESULTS: SIGNIFICANT CHANGE UP
EGFR: 54 ML/MIN/1.73M2 — LOW
EGFR: 57 ML/MIN/1.73M2 — LOW
EGFR: 58 ML/MIN/1.73M2 — LOW
GLUCOSE SERPL-MCNC: 113 MG/DL — HIGH (ref 70–99)
GLUCOSE SERPL-MCNC: 114 MG/DL — HIGH (ref 70–99)
GLUCOSE SERPL-MCNC: 131 MG/DL — HIGH (ref 70–99)
HCT VFR BLD CALC: 34.4 % — LOW (ref 34.5–45)
HGB BLD-MCNC: 10.8 G/DL — LOW (ref 11.5–15.5)
LITHIUM SERPL-MCNC: 0.3 MMOL/L — LOW (ref 0.6–1.2)
MAGNESIUM SERPL-MCNC: 1.8 MG/DL — SIGNIFICANT CHANGE UP (ref 1.6–2.6)
MAGNESIUM SERPL-MCNC: 1.9 MG/DL — SIGNIFICANT CHANGE UP (ref 1.6–2.6)
MCHC RBC-ENTMCNC: 31 PG — SIGNIFICANT CHANGE UP (ref 27–34)
MCHC RBC-ENTMCNC: 31.4 GM/DL — LOW (ref 32–36)
MCV RBC AUTO: 98.9 FL — SIGNIFICANT CHANGE UP (ref 80–100)
MRSA PCR RESULT.: SIGNIFICANT CHANGE UP
NRBC # BLD: 0 /100 WBCS — SIGNIFICANT CHANGE UP (ref 0–0)
NRBC # FLD: 0 K/UL — SIGNIFICANT CHANGE UP (ref 0–0)
PHOSPHATE SERPL-MCNC: 3.3 MG/DL — SIGNIFICANT CHANGE UP (ref 2.5–4.5)
PHOSPHATE SERPL-MCNC: 3.4 MG/DL — SIGNIFICANT CHANGE UP (ref 2.5–4.5)
PLATELET # BLD AUTO: 183 K/UL — SIGNIFICANT CHANGE UP (ref 150–400)
POTASSIUM SERPL-MCNC: 3.7 MMOL/L — SIGNIFICANT CHANGE UP (ref 3.5–5.3)
POTASSIUM SERPL-MCNC: 3.7 MMOL/L — SIGNIFICANT CHANGE UP (ref 3.5–5.3)
POTASSIUM SERPL-MCNC: 3.8 MMOL/L — SIGNIFICANT CHANGE UP (ref 3.5–5.3)
POTASSIUM SERPL-SCNC: 3.7 MMOL/L — SIGNIFICANT CHANGE UP (ref 3.5–5.3)
POTASSIUM SERPL-SCNC: 3.7 MMOL/L — SIGNIFICANT CHANGE UP (ref 3.5–5.3)
POTASSIUM SERPL-SCNC: 3.8 MMOL/L — SIGNIFICANT CHANGE UP (ref 3.5–5.3)
PROT SERPL-MCNC: 6.3 G/DL — SIGNIFICANT CHANGE UP (ref 6–8.3)
PROT SERPL-MCNC: 6.7 G/DL — SIGNIFICANT CHANGE UP (ref 6–8.3)
PROT SERPL-MCNC: 7 G/DL — SIGNIFICANT CHANGE UP (ref 6–8.3)
RBC # BLD: 3.48 M/UL — LOW (ref 3.8–5.2)
RBC # FLD: 14.5 % — SIGNIFICANT CHANGE UP (ref 10.3–14.5)
S AUREUS DNA NOSE QL NAA+PROBE: SIGNIFICANT CHANGE UP
SODIUM SERPL-SCNC: 139 MMOL/L — SIGNIFICANT CHANGE UP (ref 135–145)
SODIUM SERPL-SCNC: 143 MMOL/L — SIGNIFICANT CHANGE UP (ref 135–145)
SODIUM SERPL-SCNC: 145 MMOL/L — SIGNIFICANT CHANGE UP (ref 135–145)
SPECIMEN SOURCE: SIGNIFICANT CHANGE UP
SPECIMEN SOURCE: SIGNIFICANT CHANGE UP
WBC # BLD: 8.76 K/UL — SIGNIFICANT CHANGE UP (ref 3.8–10.5)
WBC # FLD AUTO: 8.76 K/UL — SIGNIFICANT CHANGE UP (ref 3.8–10.5)

## 2023-07-31 PROCEDURE — 99291 CRITICAL CARE FIRST HOUR: CPT | Mod: GC

## 2023-07-31 PROCEDURE — 99232 SBSQ HOSP IP/OBS MODERATE 35: CPT

## 2023-07-31 RX ORDER — SODIUM CHLORIDE 9 MG/ML
1000 INJECTION INTRAMUSCULAR; INTRAVENOUS; SUBCUTANEOUS
Refills: 0 | Status: DISCONTINUED | OUTPATIENT
Start: 2023-07-31 | End: 2023-07-31

## 2023-07-31 RX ORDER — POTASSIUM CHLORIDE 20 MEQ
40 PACKET (EA) ORAL ONCE
Refills: 0 | Status: COMPLETED | OUTPATIENT
Start: 2023-07-31 | End: 2023-07-31

## 2023-07-31 RX ORDER — ACETAMINOPHEN 500 MG
650 TABLET ORAL EVERY 6 HOURS
Refills: 0 | Status: DISCONTINUED | OUTPATIENT
Start: 2023-07-31 | End: 2023-08-04

## 2023-07-31 RX ORDER — MAGNESIUM SULFATE 500 MG/ML
2 VIAL (ML) INJECTION ONCE
Refills: 0 | Status: COMPLETED | OUTPATIENT
Start: 2023-07-31 | End: 2023-07-31

## 2023-07-31 RX ADMIN — Medication 25 GRAM(S): at 04:34

## 2023-07-31 RX ADMIN — HEPARIN SODIUM 5000 UNIT(S): 5000 INJECTION INTRAVENOUS; SUBCUTANEOUS at 14:00

## 2023-07-31 RX ADMIN — Medication 40 MILLIEQUIVALENT(S): at 14:00

## 2023-07-31 RX ADMIN — HALOPERIDOL DECANOATE 5 MILLIGRAM(S): 100 INJECTION INTRAMUSCULAR at 05:28

## 2023-07-31 RX ADMIN — SODIUM CHLORIDE 175 MILLILITER(S): 9 INJECTION INTRAMUSCULAR; INTRAVENOUS; SUBCUTANEOUS at 04:35

## 2023-07-31 RX ADMIN — HALOPERIDOL DECANOATE 5 MILLIGRAM(S): 100 INJECTION INTRAMUSCULAR at 21:48

## 2023-07-31 RX ADMIN — HALOPERIDOL DECANOATE 5 MILLIGRAM(S): 100 INJECTION INTRAMUSCULAR at 14:00

## 2023-07-31 RX ADMIN — Medication 0.5 MILLIGRAM(S): at 20:03

## 2023-07-31 RX ADMIN — SODIUM CHLORIDE 175 MILLILITER(S): 9 INJECTION INTRAMUSCULAR; INTRAVENOUS; SUBCUTANEOUS at 05:28

## 2023-07-31 RX ADMIN — HEPARIN SODIUM 5000 UNIT(S): 5000 INJECTION INTRAVENOUS; SUBCUTANEOUS at 21:49

## 2023-07-31 RX ADMIN — CHLORHEXIDINE GLUCONATE 1 APPLICATION(S): 213 SOLUTION TOPICAL at 10:00

## 2023-07-31 RX ADMIN — DEXMEDETOMIDINE HYDROCHLORIDE IN 0.9% SODIUM CHLORIDE 3.87 MICROGRAM(S)/KG/HR: 4 INJECTION INTRAVENOUS at 04:35

## 2023-07-31 RX ADMIN — HEPARIN SODIUM 5000 UNIT(S): 5000 INJECTION INTRAVENOUS; SUBCUTANEOUS at 05:28

## 2023-07-31 RX ADMIN — Medication 0.5 MILLIGRAM(S): at 10:37

## 2023-07-31 NOTE — PHYSICAL THERAPY INITIAL EVALUATION ADULT - GAIT DISTANCE, PT EVAL
4 side steps, 2 steps forward/backwards, Pt demonstrated multiple loss of balances requiring moderate assistance to maintain upright posture

## 2023-07-31 NOTE — PHYSICAL THERAPY INITIAL EVALUATION ADULT - ADDITIONAL COMMENTS
?poor historian, please confirm the following with care coordinator note. Pt stated she lives with family in a house with 0 stairs. prior to admission Pt stated she was independent with all mobility and ambulated without an assistive device.    Pt. left comfortable in bed with all tubes/lines intact, call bell in reach and in NAD. RN staff present at bedside.

## 2023-07-31 NOTE — PHYSICAL THERAPY INITIAL EVALUATION ADULT - ORIENTATION, REHAB EVAL
not oriented to time; Pt able to state the year, Pt unable to state the month not oriented to time; Pt able to state the year, Pt unable to state the month, RNTabitha, made aware

## 2023-07-31 NOTE — PHYSICAL THERAPY INITIAL EVALUATION ADULT - PERTINENT HX OF CURRENT PROBLEM, REHAB EVAL
Pt is a 58 year old female who presented to the hospital for urgent hemodialysis for Lithium toxicity associated with acute kidney injury/Acute tubular necrosis complicated by possible urinary tract infection.

## 2023-07-31 NOTE — PHYSICAL THERAPY INITIAL EVALUATION ADULT - GENERAL OBSERVATIONS, REHAB EVAL
Pt received semi-supine, +monitor lines, HR: 95 beats per minute, NAD, RN staff present at bedside throughout.

## 2023-07-31 NOTE — BH CONSULTATION LIAISON PROGRESS NOTE - NSBHASSESSMENTFT_PSY_ALL_CORE
Patient is a 58F w Bipolar 1 who is here for urgent HD for Lithium toxicity a/w ALICJA/ATN c/b possible UTI. Patient is  but in a relationship, domiciled w/  daughter, her boyfriend, employed as a transport dispatch at [employer], w/ PPHx of bipolar d/o followed in [facility], w/ 4 prior psychiatric hospitalizations most recent one in 2016 at [facility] for "psychotic break" as per daughter, no known suicide attempt or violence hx, no known legal hx, no known substance abuse hx.   Spoke with daughter at bedside. Patient is sedated in ICU.  As per daughter, patient has a dx of bipolar disorder. she has a hx of inpatient admissions but has been doing well since 2016. Patient has been following outpatient with BLAYNE Castorena and compliant with medications.  Patient was recently on haldol and tapered off ( not for s/e , rather not longer psychotic). Has a previous bad s/e from a different antipsychotic but daughter cannot recall name. Daughter states patient has been stressed lately with her other daughter and having the responsibility of taking care of her grandchild, and still working. States patient is independent at baseline.     7/29: Patient still delirious. Requiring PRNs for agitation. Unable to fully assess orientation. Continue 1:1.  7/31: delirium improving, more awake, mild dysmetria to bounds but no tremors, c/w 1:1    PLAN  - patient on CO for agitation, would continue unable to assess SI/HI due to current state   - EKG - antipsychotics can only be given if qtc < 500   - agree with primary and tox - HD completed - lithium level 0.8  - continue to HOLD home lithium  - c/w standing Haldol 2.5mg TID for ongoing agitation and restlessness /delirium   - PRN haldol 5mg q6hrs for breakthrough agitation  - CL will follow

## 2023-07-31 NOTE — PROGRESS NOTE ADULT - SUBJECTIVE AND OBJECTIVE BOX
[facility] Division of Kidney Diseases & Hypertension  FOLLOW UP NOTE  --------------------------------------------------------------------------------  HPI: 57 yo F with h/o bipolar disorder (on lithium) presenting with lethargy for 1 week with nausea, vomiting, diarrhea, and decreased PO intake. Nephrology was consulted for ALICJA and elevated lithium level. Lithium level was elevated to 5.3 on admission (7/25).  Required HD for lithium intoxication last session was 7/27.     24 hour events/subjective: Pt. was seen and evaluated in the MICU this afternoon. Awake/alert and cracking jokes.  No chest pain no shortness of breath feels well says she usually drinks and urinates a lot.      PAST HISTORY  --------------------------------------------------------------------------------  No significant changes to PMH, PSH, FHx, SHx, unless otherwise noted    ALLERGIES & MEDICATIONS  --------------------------------------------------------------------------------  Allergies    penicillins (Unknown)  amoxicillin (Unknown)  penicillin (Hives)    Intolerances      Standing Inpatient Medications  chlorhexidine 2% Cloths 1 Application(s) Topical daily  haloperidol    Injectable 5 milliGRAM(s) IntraMuscular three times a day  heparin   Injectable 5000 Unit(s) SubCutaneous every 8 hours  LORazepam   Injectable 0.5 milliGRAM(s) IV Push two times a day    PRN Inpatient Medications  haloperidol    Injectable 5 milliGRAM(s) IntraMuscular every 6 hours PRN      REVIEW OF SYSTEMS  --------------------------------------------------------------------------------  Gen: no fever  Respiratory: no sob  CV: no chest pain  GI: no ab pain  : urinates frequently  MSK: no complaints    VITALS/PHYSICAL EXAM  --------------------------------------------------------------------------------  T(C): 36.6 (07-31-23 @ 08:00), Max: 37.2 (07-31-23 @ 00:25)  HR: 95 (07-31-23 @ 12:17) (54 - 95)  BP: 119/75 (07-31-23 @ 12:17) (101/63 - 161/70)  ABP: --  ABP(mean): --  RR: 21 (07-31-23 @ 12:17) (12 - 25)  SpO2: 99% (07-31-23 @ 12:17) (97% - 100%)  CVP(mm Hg): --        07-30-23 @ 07:01  -  07-31-23 @ 07:00  --------------------------------------------------------  IN: 4737.3 mL / OUT: 5235 mL / NET: -497.7 mL    07-31-23 @ 07:01  -  07-31-23 @ 15:47  --------------------------------------------------------  IN: 525 mL / OUT: 800 mL / NET: -275 mL      Physical Exam:  Gen: NAD, well-appearing  HEENT: NCAT, MMM  Pulm: CTA B/L  CV: S1S2, RRR  Abd: +BS, soft, nontender/nondistended  : No suprapubic tenderness  Extremities: no bilateral LE edema noted  Skin: Warm, without rashes      LABS/STUDIES  --------------------------------------------------------------------------------              10.8   8.76  >-----------<  183      [07-31-23 @ 00:15]              34.4     143  |  112  |  7   ----------------------------<  114      [07-31-23 @ 08:00]  3.7   |  20  |  1.16        Ca     8.6     [07-31-23 @ 08:00]      iCa    1.23     [07-31 @ 00:15]      Mg     1.80     [07-31-23 @ 00:15]      Phos  3.4     [07-31-23 @ 00:15]    TPro  6.3  /  Alb  3.3  /  TBili  0.3  /  DBili  x   /  AST  41  /  ALT  71  /  AlkPhos  109  [07-31-23 @ 08:00]          Creatinine Trend:  SCr 1.16 [07-31 @ 08:00]  SCr 1.11 [07-31 @ 00:15]  SCr 0.83 [07-30 @ 21:00]  SCr 1.02 [07-30 @ 15:51]  SCr 1.01 [07-30 @ 02:00]    Urinalysis - [07-31-23 @ 08:00]      Color  / Appearance  / SG  / pH       Gluc 114 / Ketone   / Bili  / Urobili        Blood  / Protein  / Leuk Est  / Nitrite       RBC  / WBC  / Hyaline  / Gran  / Sq Epi  / Non Sq Epi  / Bacteria     Urine Sodium 61      [07-28-23 @ 18:38]  Urine Potassium 9.4      [07-28-23 @ 18:38]  Urine Chloride 37      [07-28-23 @ 18:38]  Urine Osmolality 170      [07-28-23 @ 18:38]    TSH 0.48      [07-25-23 @ 17:53]    HBsAb 17.2      [07-26-23 @ 05:30]  HBsAg Nonreact      [07-26-23 @ 05:30]  HBcAb Nonreact      [07-26-23 @ 05:30]

## 2023-07-31 NOTE — CHART NOTE - NSCHARTNOTEFT_GEN_A_CORE
MICU DOWN GRADE NOTE    Transfer from: MICU    Transfer to: ( X ) Medicine    ( ) Telemetry    (   ) RCU     (    ) Palliative    (   ) Stroke Unit    (   ) __________________    Accepting Physician:       HPI:  58F [facility] bipolar on lithium who presented to ED on 7/25 for 1 week of lethargy and fatigue. Per patient's daughter, patient had not been responsive or talking to her since earlier in the day. Patient went to outpatient [facility] and was referred to SEE for evaluation. Per patient's family member, the the patient has not taken her medications since two days prior to admission due to nausea, vomiting.  Patient's family member states she has been nauseous and having multiple episodes of diarrhea for the last week.  Patient has not had a p.o. intake in over 24 hours.    In ED, Lithium level 5.3 (0.6-1.2 normal), Cr. 3.81, WBC 14, blood glucose 168 UA showing leukocytes, LE, Nitrites negative, Bacteria, Ketones, Calcium Oxalate Crystals. CT head and CXR WNL. Admitted to MICU for urgent hemodialysis iso Lithium toxicity.    MICU Course:    Pt underwent urgent HD given persistently elevated lithium levels on 7/26 and on 7/27. Lithium levels have remained <0.6 since 7/29. Course c/b nephrogenic DI, chlorthiazide started 7/30, IVF replacement d/c'd 7/31.  evaluated for pt's bipolar disorder, pt was previously on precedex gtt - off 7/31 AM; agitation currently on haldol 5mg TID + ativan 0.5mg BID standing + haldol 5mg PRN. 7/28 night pt agitated, bit bed railing and lose two bottom teeth - dental evaluated, no tooth aspirated or in stomach on XR. Course c/b UTI - completed CTX x3 days. Currently tolerating pureed diet well.      To Do:  []  following, pt needs formal psych assessment prior to d/c  [] Shared room / constant observation  [] QTc monitoring, hold haldol QTC <500  [] Dental F/U with in patient Adult Clinic once mental status has improved  [] Strict Is/Os  [] Na+ 143 most recently; IVF d/c'd iso advancing diet; if pt remains significantly polyuric and hypernatremic, can consider resuming HCTZ        T(C): 36.3 (07-31-23 @ 20:00), Max: 37.2 (07-31-23 @ 00:25)  HR: 92 (07-31-23 @ 20:00) (57 - 107)  BP: 155/89 (07-31-23 @ 20:00) (102/86 - 163/99)  RR: 20 (07-31-23 @ 20:00) (12 - 21)  SpO2: 100% (07-31-23 @ 20:00) (97% - 100%)  Wt(kg): --Vital Signs Last 24 Hrs  T(C): 36.3 (31 Jul 2023 20:00), Max: 37.2 (31 Jul 2023 00:25)  T(F): 97.3 (31 Jul 2023 20:00), Max: 98.9 (31 Jul 2023 00:25)  HR: 92 (31 Jul 2023 20:00) (57 - 107)  BP: 155/89 (31 Jul 2023 20:00) (102/86 - 163/99)  BP(mean): 103 (31 Jul 2023 20:00) (67 - 120)  RR: 20 (31 Jul 2023 20:00) (12 - 21)  SpO2: 100% (31 Jul 2023 20:00) (97% - 100%)    Parameters below as of 31 Jul 2023 20:00  Patient On (Oxygen Delivery Method): room air        PHYSICAL EXAM:  GENERAL: NAD, well-groomed, well-developed  HEAD:  Atraumatic, Normocephalic  EYES: EOMI, PERRLA, conjunctiva and sclera clear  ENMT:  Moist mucous membranes  NECK: Supple, No JVD,  CHEST/LUNG: Clear to auscultation bilaterally; No rales, rhonchi, wheezing, or rubs  HEART: Regular rate and rhythm; No murmurs, rubs, or gallops  ABDOMEN: Soft, Nontender, Nondistended; Bowel sounds present  NEURO: Alert & Oriented X3  EXTREMITIES: No LE edema, no calf tenderness  LYMPH: No lymphadenopathy noted  SKIN: No rashes or lesions      Assessment/Plan:      To Do: MICU DOWN GRADE NOTE    Transfer from: MICU    Transfer to: ( X ) Medicine    ( ) Telemetry    (   ) RCU     (    ) Palliative    (   ) Stroke Unit    (   ) __________________    Accepting Physician:       HPI:  58F [facility] bipolar on lithium who presented to ED on 7/25 for 1 week of lethargy and fatigue. Per patient's daughter, patient had not been responsive or talking to her since earlier in the day. Patient went to outpatient [facility] and was referred to SEE for evaluation. Per patient's family member, the the patient has not taken her medications since two days prior to admission due to nausea, vomiting.  Patient's family member states she has been nauseous and having multiple episodes of diarrhea for the last week.  Patient has not had a p.o. intake in over 24 hours.    In ED, Lithium level 5.3 (0.6-1.2 normal), Cr. 3.81, WBC 14, blood glucose 168 UA showing leukocytes, LE, Nitrites negative, Bacteria, Ketones, Calcium Oxalate Crystals. CT head and CXR WNL. Admitted to MICU for urgent hemodialysis iso Lithium toxicity.    MICU Course:    Pt underwent urgent HD given persistently elevated lithium levels on 7/26 and on 7/27. Lithium levels have remained <0.6 since 7/29. Course c/b nephrogenic DI, chlorthiazide started 7/30, IVF replacement d/c'd 7/31.  evaluated for pt's bipolar disorder, pt was previously on precedex gtt - off 7/31 AM; agitation currently on haldol 5mg TID + ativan 0.5mg BID standing + haldol 5mg PRN. 7/28 night pt agitated, bit bed railing and lose two bottom teeth - dental evaluated, no tooth aspirated or in stomach on XR. Course c/b UTI - completed CTX x3 days. Currently tolerating pureed diet well.      To Do:  []  following, pt needs formal psych assessment prior to d/c  [] Shared room / constant observation  [] QTc monitoring, hold haldol QTC <500; 7/30 QTc 423  [] Dental F/U with in patient Adult Clinic once mental status has improved  [] Strict Is/Os  [] Na+ 143 most recently; IVF d/c'd iso advancing diet; if pt remains significantly polyuric and hypernatremic, can consider resuming HCTZ        T(C): 36.3 (07-31-23 @ 20:00), Max: 37.2 (07-31-23 @ 00:25)  HR: 92 (07-31-23 @ 20:00) (57 - 107)  BP: 155/89 (07-31-23 @ 20:00) (102/86 - 163/99)  RR: 20 (07-31-23 @ 20:00) (12 - 21)  SpO2: 100% (07-31-23 @ 20:00) (97% - 100%)  Wt(kg): --Vital Signs Last 24 Hrs  T(C): 36.3 (31 Jul 2023 20:00), Max: 37.2 (31 Jul 2023 00:25)  T(F): 97.3 (31 Jul 2023 20:00), Max: 98.9 (31 Jul 2023 00:25)  HR: 92 (31 Jul 2023 20:00) (57 - 107)  BP: 155/89 (31 Jul 2023 20:00) (102/86 - 163/99)  BP(mean): 103 (31 Jul 2023 20:00) (67 - 120)  RR: 20 (31 Jul 2023 20:00) (12 - 21)  SpO2: 100% (31 Jul 2023 20:00) (97% - 100%)    Parameters below as of 31 Jul 2023 20:00  Patient On (Oxygen Delivery Method): room air        PHYSICAL EXAM:  GENERAL: NAD, comfortable  HEENT: Atraumatic, normocephalic; bottom two teeth missing, hemostatic and healing  NECK: Supple, No JVD  CHEST/LUNG: Clear to auscultation bilaterally; No rales, rhonchi, wheezing, or rubs. Unlabored respirations  HEART: Regular rate and rhythm; No murmurs, rubs, or gallops  ABDOMEN: Bowel sounds present; Soft, Nontender, Nondistended. No hepatomegaly  EXTREMITIES:  2+ Peripheral Pulses, brisk capillary refill. Minimal BL LE edema. No clubbing or cyanosis  NERVOUS SYSTEM:  Alert & Oriented X4, speech clear. No deficits.  SKIN: No rashes or lesions MICU DOWN GRADE NOTE    Transfer from: MICU    Transfer to: ( X ) Medicine    ( ) Telemetry    (   ) RCU     (    ) Palliative    (   ) Stroke Unit    (   ) __________________    Accepting Physician: Dr. Power  Signout: MAR  Bed: 921D      HPI:  58F [facility] bipolar on lithium who presented to ED on 7/25 for 1 week of lethargy and fatigue. Per patient's daughter, patient had not been responsive or talking to her since earlier in the day. Patient went to outpatient [facility] and was referred to [facility] for evaluation. Per patient's family member, the the patient has not taken her medications since two days prior to admission due to nausea, vomiting.  Patient's family member states she has been nauseous and having multiple episodes of diarrhea for the last week.  Patient has not had a p.o. intake in over 24 hours.    In ED, Lithium level 5.3 (0.6-1.2 normal), Cr. 3.81, WBC 14, blood glucose 168 UA showing leukocytes, LE, Nitrites negative, Bacteria, Ketones, Calcium Oxalate Crystals. CT head and CXR WNL. Admitted to MICU for urgent hemodialysis iso Lithium toxicity.    MICU Course:    Pt underwent urgent HD given persistently elevated lithium levels on 7/26 and on 7/27. Lithium levels have remained <0.6 since 7/29. Course c/b nephrogenic DI, chlorthiazide started 7/30, IVF replacement d/c'd 7/31.  evaluated for pt's bipolar disorder, pt was previously on precedex gtt - off 7/31 AM; agitation currently on haldol 5mg TID + ativan 0.5mg BID standing + haldol 5mg PRN. 7/28 night pt agitated, bit bed railing and lose two bottom teeth - dental evaluated, no tooth aspirated or in stomach on XR. Course c/b UTI - completed CTX x3 days. Currently tolerating pureed diet well.      To Do:  []  following, pt needs formal psych assessment prior to d/c  [] Shared room / constant observation  [] QTc monitoring, hold haldol QTC <500; 7/30 QTc 423  [] Dental F/U with in patient Adult Clinic once mental status has improved  [] Strict Is/Os  [] Na+ 143 most recently; IVF d/c'd iso advancing diet; if pt remains significantly polyuric and hypernatremic, can consider resuming HCTZ        T(C): 36.3 (07-31-23 @ 20:00), Max: 37.2 (07-31-23 @ 00:25)  HR: 92 (07-31-23 @ 20:00) (57 - 107)  BP: 155/89 (07-31-23 @ 20:00) (102/86 - 163/99)  RR: 20 (07-31-23 @ 20:00) (12 - 21)  SpO2: 100% (07-31-23 @ 20:00) (97% - 100%)  Wt(kg): --Vital Signs Last 24 Hrs  T(C): 36.3 (31 Jul 2023 20:00), Max: 37.2 (31 Jul 2023 00:25)  T(F): 97.3 (31 Jul 2023 20:00), Max: 98.9 (31 Jul 2023 00:25)  HR: 92 (31 Jul 2023 20:00) (57 - 107)  BP: 155/89 (31 Jul 2023 20:00) (102/86 - 163/99)  BP(mean): 103 (31 Jul 2023 20:00) (67 - 120)  RR: 20 (31 Jul 2023 20:00) (12 - 21)  SpO2: 100% (31 Jul 2023 20:00) (97% - 100%)    Parameters below as of 31 Jul 2023 20:00  Patient On (Oxygen Delivery Method): room air        PHYSICAL EXAM:  GENERAL: NAD, comfortable  HEENT: Atraumatic, normocephalic; bottom two teeth missing, hemostatic and healing  NECK: Supple, No JVD  CHEST/LUNG: Clear to auscultation bilaterally; No rales, rhonchi, wheezing, or rubs. Unlabored respirations  HEART: Regular rate and rhythm; No murmurs, rubs, or gallops  ABDOMEN: Bowel sounds present; Soft, Nontender, Nondistended. No hepatomegaly  EXTREMITIES:  2+ Peripheral Pulses, brisk capillary refill. Minimal BL LE edema. No clubbing or cyanosis  NERVOUS SYSTEM:  Alert & Oriented X4, speech clear. No deficits.  SKIN: No rashes or lesions

## 2023-07-31 NOTE — PHYSICAL THERAPY INITIAL EVALUATION ADULT - NSPTDISCHREC_GEN_A_CORE
Restorative rehab to address current functional limitation to optimize safety to allow Pt to reach their optimal level of function.

## 2023-07-31 NOTE — PROGRESS NOTE ADULT - ASSESSMENT
Assessment:  Ms Flores is a 59yo Female with a PMHx of bipolar 1 disorder who was admitted to MICU for urgent HD. Improved and medically stable at this time.    NEURO:  #Mental status: AAOx4   #Bipolar disorder w/ montserrat   #Lithium Toxicity  - continue w/ Neuro checks for lithium toxicity  - psych consult recs appreciated  - haldol 5 mg TID standing; haldol 5 mg PRN for agitation   - Ativan 0.5 BID added    CV:  #Hemodynamically stable:   - prev on levophed due to vasoplegic vs hypovolemic shock  - cont D5 fluids (increased rate to 175)    PULM:  - satting well on RA    RENAL:  #ALICJA  #Lithium toxicity  #Nephrogenic DI  - s/p HD x 3 on 7/26-27  - monitor I/O's  - rigo woo removed   - HCTZ 250BID started 2 days ago however doses not given (unsure why)  - Will discuss with nephro for recs on hctz vs amiloride  - Na improved; replenish K+ and Mg as needed    GI:  #Diet: Pureed   #Tooth loss   - Patient lost bottom teeth on 7/28 night after biting bed rail agitation   - CXR showing no radiodense object aspirated or in stomach  - Re-consult adult inpatient dental when mental status stable.     ENDO:  - TSH wnl  - glucose 118    METABOLIC:  #Hypokalemia  #Hypernatremia; improving  - Trend electrolytes, replete if K > 4, Mg > 2, Phos > 3  - Cont free water   - Awaiting nephro recs on HCTZ vs amiloride    HEMATOLOGIC:  #Anemia   - most likely dilutional; improving   - no source of obvious bleed  #DVT prophylaxis with Heparin 5000U     ID:  # UTI  - UA positive for bacteruria LE positive nitrite negative  - urine culture positive for e. coli and coag neg staph  - blood culture no growth   - completed ceftriaxone 1g (7/26-28)  - leukocytosis resolved; afebrile     SKIN:  #Lines: peripheral lines       Dispo: Pending transfer to floor  PT consulted  Code Status: FULL CODE Assessment:  Ms Flores is a 59yo Female with a PMHx of bipolar 1 disorder who was admitted to MICU for urgent HD. Improved and medically stable at this time.    NEURO:  #Mental status: AAOx4   #Bipolar disorder w/ montserrat   #Lithium Toxicity  - continue w/ Neuro checks for lithium toxicity  - psych consult recs appreciated  - haldol 5 mg TID standing; haldol 5 mg PRN for agitation   - Ativan 0.5 BID added    CV:  #Hemodynamically stable:   - prev on levophed due to vasoplegic vs hypovolemic shock  - cont D5 fluids (increased rate to 175)    PULM:  - satting well on RA    RENAL:  #ALICJA  - improved  #Lithium toxicity  #Nephrogenic DI  - s/p HD x 3 on 7/26-27  - monitor I/O's  - rigo woo removed   - HCTZ 250BID started 2 days ago however doses not given (unsure why)  - Discussed with nephro, can stop fluids at this time and reassess need for HCTZ  - Na improved; replenish K+ and Mg as needed    GI:  #Diet: Pureed   #Tooth loss   - Patient lost bottom teeth on 7/28 night after biting bed rail agitation   - CXR showing no radiodense object aspirated or in stomach  - Re-consult adult inpatient dental when mental status stable.     ENDO:  - TSH wnl  - glucose 118    METABOLIC:  #Hypokalemia  #Hypernatremia; improving  - Trend electrolytes, replete if K > 4, Mg > 2, Phos > 3    HEMATOLOGIC:  #Anemia   - most likely dilutional; improving   - no source of obvious bleed  #DVT prophylaxis with Heparin 5000U     ID:  # UTI  - UA positive for bacteruria LE positive nitrite negative  - urine culture positive for e. coli and coag neg staph  - blood culture no growth   - completed ceftriaxone 1g (7/26-28)  - leukocytosis resolved; afebrile     SKIN:  #Lines: peripheral lines       Dispo: Pending transfer to floor  PT consulted  Code Status: FULL CODE

## 2023-07-31 NOTE — PROGRESS NOTE ADULT - PROBLEM SELECTOR PLAN 2
Pt. with h/o bipolar disorder on lithium. Lithium level was elevated to 5.3 on admission (7/25), and decreased to 1.3 after multiple HD treatments on 7/26. Lithium level remains lows with normal serum creatinine.  There is now concern for DI.  Likely has DI given urine osm was 170 when serum sodium was 150.  Can't appropriately concentrate urine although some urine output may be driven by NS.  recommend hold fluids and allow patient to eat and drink to thirst.  If patient's remains significantly polyuric and is hypernatremic despite being able to drink then can consider HCTZ.

## 2023-07-31 NOTE — PROGRESS NOTE ADULT - SUBJECTIVE AND OBJECTIVE BOX
Branden Aguilar MD PGY-1  ---------------------------------------------------------------------------------------------  Patient is a 58y old  Female who presents with a chief complaint of AMS (30 Jul 2023 12:37)      HPI:  Dede Flores is a 58-year-old female with a past medical history of bipolar on lithium who presents emergency department on 7/25 for 1 week of lethargy and fatigue. Per patient's daughter who is bedside, the patient has not been responsive or talking to her since earlier in the day. Patient went to outpatient [facility] and [facility]. Per patient's family member, the the patient has not taken her medications since two days prior to admission due to nausea, vomiting.  Patient's family member states she has been nauseous and having multiple episodes of diarrhea for the last week.  Patient has not had a p.o. intake in over 24 hours.  Patient denies any other recent illnesses.  Blood work at ED significant for Lithium level 5.3 (0.6-1.2 normal), Cr. 3.81, WBC 14, blood glucose 168 UA showing leukocytes, LE, Nitrites negative, Bacteria, Ketones, Calcium Oxalate Crystals.  CT head and CXR WNL.   Admit to MICU for urgent hemodialysis    (26 Jul 2023 00:22)      SUBJECTIVE / OVERNIGHT EVENTS: NAEO. Pt was seen and examined at bedside. Stating she feels well with no symptoms. She would like to drink a soda. Denies chest pain, shortness of breath, palpitations, confusion, agitation, nausea, vomiting, lower extremity swelling    MEDICATIONS  (STANDING):  chlorhexidine 2% Cloths 1 Application(s) Topical daily  haloperidol    Injectable 5 milliGRAM(s) IntraMuscular three times a day  heparin   Injectable 5000 Unit(s) SubCutaneous every 8 hours  LORazepam   Injectable 0.5 milliGRAM(s) IV Push two times a day  potassium chloride   Powder 40 milliEquivalent(s) Oral once  sodium chloride 0.9%. 1000 milliLiter(s) (175 mL/Hr) IV Continuous <Continuous>    MEDICATIONS  (PRN):  haloperidol    Injectable 5 milliGRAM(s) IntraMuscular every 6 hours PRN Agitation    Allergies    penicillins (Unknown)  amoxicillin (Unknown)  penicillin (Hives)    Intolerances      ICU Vital Signs Last 24 Hrs  T(F): 97.9 (31 Jul 2023 08:00), Max: 98.9 (31 Jul 2023 00:25)  HR: 95 (31 Jul 2023 12:17) (54 - 95)  BP: 119/75 (31 Jul 2023 12:17) (101/63 - 161/70)  BP(mean): 81 (31 Jul 2023 12:17) (67 - 92)  RR: 21 (31 Jul 2023 12:17) (12 - 25)  SpO2: 99% (31 Jul 2023 12:17) (97% - 100%)      Physical Exam:     GENERAL: NAD, lying in bed comfortably  HEAD:  Atraumatic, Normocephalic  EYES: EOMI, PERRLA, conjunctiva and sclera clear  ENT: Moist mucous membranes  NECK: Supple, No JVD  CHEST/LUNG: Clear to auscultation bilaterally; No rales, rhonchi, wheezing, or rubs. Unlabored respirations  HEART: Regular rate and rhythm; No murmurs, rubs, or gallops  ABDOMEN: Bowel sounds present; Soft, Nontender, Nondistended. No hepatomegally  EXTREMITIES:  2+ Peripheral Pulses, brisk capillary refill. No clubbing, cyanosis, or edema  NERVOUS SYSTEM:  Alert & Oriented X3, speech clear. No deficits.  MSK: FROM all 4 extremities, full and equal strength  SKIN: No rashes or lesions      I&O's Summary    30 Jul 2023 07:01  -  31 Jul 2023 07:00  --------------------------------------------------------  IN: 4737.3 mL / OUT: 5235 mL / NET: -497.7 mL    31 Jul 2023 07:01  -  31 Jul 2023 13:42  --------------------------------------------------------  IN: 525 mL / OUT: 800 mL / NET: -275 mL        LABS:                        10.8   8.76  )-----------( 183      ( 31 Jul 2023 00:15 )             34.4     07-31    143  |  112<H>  |  7   ----------------------------<  114<H>  3.7   |  20<L>  |  1.16    Ca    8.6      31 Jul 2023 08:00  Phos  3.4     07-31  Mg     1.80     07-31    TPro  6.3  /  Alb  3.3  /  TBili  0.3  /  DBili  x   /  AST  41<H>  /  ALT  71<H>  /  AlkPhos  109  07-31         Branden Aguilar MD PGY-1  ---------------------------------------------------------------------------------------------  Patient is a 58y old  Female who presents with a chief complaint of AMS (30 Jul 2023 12:37)      HPI:  Dede Flores is a 58-year-old female with a past medical history of bipolar on lithium who presents emergency department on 7/25 for 1 week of lethargy and fatigue. Per patient's daughter who is bedside, the patient has not been responsive or talking to her since earlier in the day. Patient went to outpatient [facility] and was referred to [facility] for evaluation. Per patient's family member, the the patient has not taken her medications since two days prior to admission due to nausea, vomiting.  Patient's family member states she has been nauseous and having multiple episodes of diarrhea for the last week.  Patient has not had a p.o. intake in over 24 hours.  Patient denies any other recent illnesses.  Blood work at ED significant for Lithium level 5.3 (0.6-1.2 normal), Cr. 3.81, WBC 14, blood glucose 168 UA showing leukocytes, LE, Nitrites negative, Bacteria, Ketones, Calcium Oxalate Crystals.  CT head and CXR WNL.   Admit to MICU for urgent hemodialysis    (26 Jul 2023 00:22)      SUBJECTIVE / OVERNIGHT EVENTS: NAEO. Pt was seen and examined at bedside. Stating she feels well with no symptoms. She would like to drink a soda. Denies chest pain, shortness of breath, palpitations, confusion, agitation, nausea, vomiting, or lower extremity swelling. Pt was able to transfer from bed to chair with assistance however notes she is very weak.    MEDICATIONS  (STANDING):  chlorhexidine 2% Cloths 1 Application(s) Topical daily  haloperidol    Injectable 5 milliGRAM(s) IntraMuscular three times a day  heparin   Injectable 5000 Unit(s) SubCutaneous every 8 hours  LORazepam   Injectable 0.5 milliGRAM(s) IV Push two times a day  potassium chloride   Powder 40 milliEquivalent(s) Oral once  sodium chloride 0.9%. 1000 milliLiter(s) (175 mL/Hr) IV Continuous <Continuous>    MEDICATIONS  (PRN):  haloperidol    Injectable 5 milliGRAM(s) IntraMuscular every 6 hours PRN Agitation    Allergies    penicillins (Unknown)  amoxicillin (Unknown)  penicillin (Hives)    Intolerances      ICU Vital Signs Last 24 Hrs  T(F): 97.9 (31 Jul 2023 08:00), Max: 98.9 (31 Jul 2023 00:25)  HR: 95 (31 Jul 2023 12:17) (54 - 95)  BP: 119/75 (31 Jul 2023 12:17) (101/63 - 161/70)  BP(mean): 81 (31 Jul 2023 12:17) (67 - 92)  RR: 21 (31 Jul 2023 12:17) (12 - 25)  SpO2: 99% (31 Jul 2023 12:17) (97% - 100%)      Physical Exam:     GENERAL: NAD, comfortable  HEENT: Atraumatic, normocephalic; bottom two teeth missing, hemostatic and healing  NECK: Supple, No JVD  CHEST/LUNG: Clear to auscultation bilaterally; No rales, rhonchi, wheezing, or rubs. Unlabored respirations  HEART: Regular rate and rhythm; No murmurs, rubs, or gallops  ABDOMEN: Bowel sounds present; Soft, Nontender, Nondistended. No hepatomegally  EXTREMITIES:  2+ Peripheral Pulses, brisk capillary refill. Minimal BL LE edema. No clubbing or cyanosis  NERVOUS SYSTEM:  Alert & Oriented X4, speech clear. No deficits.  SKIN: No rashes or lesions        I&O's Summary    30 Jul 2023 07:01  -  31 Jul 2023 07:00  --------------------------------------------------------  IN: 4737.3 mL / OUT: 5235 mL / NET: -497.7 mL    31 Jul 2023 07:01  -  31 Jul 2023 13:42  --------------------------------------------------------  IN: 525 mL / OUT: 800 mL / NET: -275 mL        LABS:                        10.8   8.76  )-----------( 183      ( 31 Jul 2023 00:15 )             34.4     07-31    143  |  112<H>  |  7   ----------------------------<  114<H>  3.7   |  20<L>  |  1.16    Ca    8.6      31 Jul 2023 08:00  Phos  3.4     07-31  Mg     1.80     07-31    TPro  6.3  /  Alb  3.3  /  TBili  0.3  /  DBili  x   /  AST  41<H>  /  ALT  71<H>  /  AlkPhos  109  07-31

## 2023-07-31 NOTE — CHART NOTE - NSCHARTNOTEFT_GEN_A_CORE
MAR Accept Note  Transfer to:  Medicine  Accepting Attending Physician:  Moon  Assigned Room:  921D    Patient seen and examined.   Labs and data reviewed.   No findings precluding transfer of service.       HPI/MICU COURSE:   Please refer to MICU transfer note for full details. Briefly, this is a 58F PMH bipolar on lithium who presented to ED on 7/25 for 1 week of lethargy and fatigue. Per patient's daughter, patient had not been responsive or talking to her since earlier in the day. Patient went to outpatient [facility] and was referred to [facility] for evaluation. Per patient's family member, the the patient has not taken her medications since two days prior to admission due to nausea, vomiting.  Patient's family member states she has been nauseous and having multiple episodes of diarrhea for the last week.  Patient has not had a p.o. intake in over 24 hours.    In ED, Lithium level 5.3 (0.6-1.2 normal), Cr. 3.81, WBC 14, blood glucose 168 UA showing leukocytes, LE, Nitrites negative, Bacteria, Ketones, Calcium Oxalate Crystals. CT head and CXR WNL. Admitted to MICU for urgent hemodialysis iso Lithium toxicity.    MICU Course:    Pt underwent urgent HD given persistently elevated lithium levels on 7/26 and on 7/27. Lithium levels have remained <0.6 since 7/29. Course c/b nephrogenic DI, chlorthiazide started 7/30, IVF replacement d/c'd 7/31.  evaluated for pt's bipolar disorder, pt was previously on precedex gtt - off 7/31 AM; agitation currently on haldol 5mg TID + ativan 0.5mg BID standing + haldol 5mg PRN. 7/28 night pt agitated, bit bed railing and lose two bottom teeth - dental evaluated, no tooth aspirated or in stomach on XR. Course c/b UTI - completed CTX x3 days. Currently tolerating pureed diet well.      To Do:  []  following, pt needs formal psych assessment prior to d/c  [] Shared room / constant observation  [] QTc monitoring, hold haldol QTC <500; 7/30 QTc 423  [] Dental F/U with [facility] patient Adult Clinic once mental status has improved  [] Strict Is/Os  [] Na+ 143 most recently; IVF d/c'd iso advancing diet; if pt remains significantly polyuric and hypernatremic, can consider resuming HCTZ      La Nena Min  IM PGY-3  MAR

## 2023-08-01 DIAGNOSIS — Z29.9 ENCOUNTER FOR PROPHYLACTIC MEASURES, UNSPECIFIED: ICD-10-CM

## 2023-08-01 DIAGNOSIS — Z79.899 OTHER LONG TERM (CURRENT) DRUG THERAPY: ICD-10-CM

## 2023-08-01 DIAGNOSIS — F31.9 BIPOLAR DISORDER, UNSPECIFIED: ICD-10-CM

## 2023-08-01 DIAGNOSIS — K08.109 COMPLETE LOSS OF TEETH, UNSPECIFIED CAUSE, UNSPECIFIED CLASS: ICD-10-CM

## 2023-08-01 LAB
ALBUMIN SERPL ELPH-MCNC: 4.4 G/DL — SIGNIFICANT CHANGE UP (ref 3.3–5)
ALP SERPL-CCNC: 154 U/L — HIGH (ref 40–120)
ALT FLD-CCNC: 61 U/L — HIGH (ref 4–33)
ANION GAP SERPL CALC-SCNC: 14 MMOL/L — SIGNIFICANT CHANGE UP (ref 7–14)
AST SERPL-CCNC: 29 U/L — SIGNIFICANT CHANGE UP (ref 4–32)
BILIRUB SERPL-MCNC: 0.3 MG/DL — SIGNIFICANT CHANGE UP (ref 0.2–1.2)
BUN SERPL-MCNC: 13 MG/DL — SIGNIFICANT CHANGE UP (ref 7–23)
CALCIUM SERPL-MCNC: 9.7 MG/DL — SIGNIFICANT CHANGE UP (ref 8.4–10.5)
CHLORIDE SERPL-SCNC: 111 MMOL/L — HIGH (ref 98–107)
CO2 SERPL-SCNC: 22 MMOL/L — SIGNIFICANT CHANGE UP (ref 22–31)
CREAT SERPL-MCNC: 1.17 MG/DL — SIGNIFICANT CHANGE UP (ref 0.5–1.3)
EGFR: 53 ML/MIN/1.73M2 — LOW
GLUCOSE SERPL-MCNC: 159 MG/DL — HIGH (ref 70–99)
HCT VFR BLD CALC: 34.7 % — SIGNIFICANT CHANGE UP (ref 34.5–45)
HGB BLD-MCNC: 11 G/DL — LOW (ref 11.5–15.5)
MAGNESIUM SERPL-MCNC: 2 MG/DL — SIGNIFICANT CHANGE UP (ref 1.6–2.6)
MCHC RBC-ENTMCNC: 31.3 PG — SIGNIFICANT CHANGE UP (ref 27–34)
MCHC RBC-ENTMCNC: 31.7 GM/DL — LOW (ref 32–36)
MCV RBC AUTO: 98.6 FL — SIGNIFICANT CHANGE UP (ref 80–100)
NRBC # BLD: 0 /100 WBCS — SIGNIFICANT CHANGE UP (ref 0–0)
NRBC # FLD: 0 K/UL — SIGNIFICANT CHANGE UP (ref 0–0)
PHOSPHATE SERPL-MCNC: 3.3 MG/DL — SIGNIFICANT CHANGE UP (ref 2.5–4.5)
PLATELET # BLD AUTO: 287 K/UL — SIGNIFICANT CHANGE UP (ref 150–400)
POTASSIUM SERPL-MCNC: 4.1 MMOL/L — SIGNIFICANT CHANGE UP (ref 3.5–5.3)
POTASSIUM SERPL-SCNC: 4.1 MMOL/L — SIGNIFICANT CHANGE UP (ref 3.5–5.3)
PROT SERPL-MCNC: 7.7 G/DL — SIGNIFICANT CHANGE UP (ref 6–8.3)
RBC # BLD: 3.52 M/UL — LOW (ref 3.8–5.2)
RBC # FLD: 15.3 % — HIGH (ref 10.3–14.5)
SODIUM SERPL-SCNC: 147 MMOL/L — HIGH (ref 135–145)
WBC # BLD: 9.04 K/UL — SIGNIFICANT CHANGE UP (ref 3.8–10.5)
WBC # FLD AUTO: 9.04 K/UL — SIGNIFICANT CHANGE UP (ref 3.8–10.5)

## 2023-08-01 PROCEDURE — 99233 SBSQ HOSP IP/OBS HIGH 50: CPT | Mod: GC

## 2023-08-01 PROCEDURE — 99231 SBSQ HOSP IP/OBS SF/LOW 25: CPT

## 2023-08-01 PROCEDURE — 93010 ELECTROCARDIOGRAM REPORT: CPT

## 2023-08-01 RX ADMIN — HALOPERIDOL DECANOATE 5 MILLIGRAM(S): 100 INJECTION INTRAMUSCULAR at 21:47

## 2023-08-01 RX ADMIN — HEPARIN SODIUM 5000 UNIT(S): 5000 INJECTION INTRAVENOUS; SUBCUTANEOUS at 21:49

## 2023-08-01 RX ADMIN — Medication 0.5 MILLIGRAM(S): at 10:39

## 2023-08-01 RX ADMIN — HEPARIN SODIUM 5000 UNIT(S): 5000 INJECTION INTRAVENOUS; SUBCUTANEOUS at 14:17

## 2023-08-01 RX ADMIN — HALOPERIDOL DECANOATE 5 MILLIGRAM(S): 100 INJECTION INTRAMUSCULAR at 14:18

## 2023-08-01 RX ADMIN — CHLORHEXIDINE GLUCONATE 1 APPLICATION(S): 213 SOLUTION TOPICAL at 14:21

## 2023-08-01 RX ADMIN — HALOPERIDOL DECANOATE 5 MILLIGRAM(S): 100 INJECTION INTRAMUSCULAR at 07:17

## 2023-08-01 RX ADMIN — HEPARIN SODIUM 5000 UNIT(S): 5000 INJECTION INTRAVENOUS; SUBCUTANEOUS at 07:18

## 2023-08-01 NOTE — PROGRESS NOTE ADULT - SUBJECTIVE AND OBJECTIVE BOX
Hannah López, MS4    PROGRESS NOTE:     Patient is a 58y old  Female who presents with a chief complaint of AMS (31 Jul 2023 15:45)        SUBJECTIVE / OVERNIGHT EVENTS: No acute overnight events. Patient is awake, alert, and cracking jokes. She denies any acute complaints.      MEDICATIONS  (STANDING):  chlorhexidine 2% Cloths 1 Application(s) Topical daily  haloperidol    Injectable 5 milliGRAM(s) IntraMuscular three times a day  heparin   Injectable 5000 Unit(s) SubCutaneous every 8 hours  LORazepam   Injectable 0.5 milliGRAM(s) IV Push two times a day    MEDICATIONS  (PRN):  acetaminophen     Tablet .. 650 milliGRAM(s) Oral every 6 hours PRN Temp greater or equal to 38C (100.4F), Mild Pain (1 - 3)  haloperidol    Injectable 5 milliGRAM(s) IntraMuscular every 6 hours PRN Agitation      CAPILLARY BLOOD GLUCOSE        I&O's Summary    31 Jul 2023 07:01  -  01 Aug 2023 07:00  --------------------------------------------------------  IN: 2126 mL / OUT: 2250 mL / NET: -124 mL        PHYSICAL EXAM:  Vital Signs Last 24 Hrs  T(C): 36.5 (01 Aug 2023 10:30), Max: 37.2 (01 Aug 2023 07:00)  T(F): 97.7 (01 Aug 2023 10:30), Max: 99 (01 Aug 2023 07:00)  HR: 94 (01 Aug 2023 10:30) (77 - 110)  BP: 137/82 (01 Aug 2023 10:30) (116/75 - 163/99)  BP(mean): 91 (31 Jul 2023 21:00) (82 - 120)  RR: 18 (01 Aug 2023 10:30) (12 - 21)  SpO2: 100% (01 Aug 2023 10:30) (96% - 100%)    Parameters below as of 01 Aug 2023 10:30  Patient On (Oxygen Delivery Method): room air      GENERAL: No acute distress, well-appearing  HEENT: bottom two teeth missing  CHEST/LUNG: CTAB; No wheezes, rales, or rhonchi  HEART: Regular rate and rhythm; Normal s1 and s2, No murmurs, rubs, or gallops  ABDOMEN: Soft, non-tender, non-distended  EXTREMITIES:  2+ peripheral pulses b/l  NEUROLOGY: A&O x 3, no focal deficits    LABS:                        10.8   8.76  )-----------( 183      ( 31 Jul 2023 00:15 )             34.4     07-31    145  |  112<H>  |  9   ----------------------------<  113<H>  3.8   |  20<L>  |  1.09    Ca    9.4      31 Jul 2023 20:10  Phos  3.3     07-31  Mg     1.90     07-31    TPro  7.0  /  Alb  3.9  /  TBili  0.4  /  DBili  x   /  AST  39<H>  /  ALT  72<H>  /  AlkPhos  134<H>  07-31      Urinalysis Basic - ( 31 Jul 2023 20:10 )    Color: x / Appearance: x / SG: x / pH: x  Gluc: 113 mg/dL / Ketone: x  / Bili: x / Urobili: x   Blood: x / Protein: x / Nitrite: x   Leuk Esterase: x / RBC: x / WBC x   Sq Epi: x / Non Sq Epi: x / Bacteria: x

## 2023-08-01 NOTE — PROGRESS NOTE ADULT - PROBLEM SELECTOR PLAN 4
Patient lost two bottom teeth 728 due to biting bed railing in episode of agitation.   -F/u with inpatient adult dental clinic once patient's mental status improves  -XR did not show aspiration or swallowing of teeth

## 2023-08-01 NOTE — SWALLOW BEDSIDE ASSESSMENT ADULT - ASR SWALLOW RECOMMEND DIAG
Objective testing NOT warranted given no overt signs of impaired airway protection and CXR is clear.
Cinesophagram NOT indicated given clear chest imaging and functional swallow at bedside

## 2023-08-01 NOTE — PROGRESS NOTE ADULT - PROBLEM SELECTOR PLAN 2
Lithium on hold due to lithium toxicity.   -Psych C/L following   -Haldol 5mg TID for ongoing agitation and restlessness/delirium   -PRN haldol 5mg q6h for breakthrough agitation   -QTc monitoring, hold haldol QTC<500  -C/L considering possible inpatient admission vs outpatient follow-up depending on medication response  -D/c standing ativan as per psych recs

## 2023-08-01 NOTE — SWALLOW BEDSIDE ASSESSMENT ADULT - SWALLOW EVAL: DIAGNOSIS
1. Functional oral stage for puree, moderately thick liquids and mildly thick liquids marked by adequate oral acceptance, collection and transfer. 2. Functional pharyngeal phase for puree, moderately thick liquids and mildly thick liquids marked by a present pharyngeal swallow trigger with hyolaryngeal elevation noted upon digital palpation without evidence of impaired airway protection. Of Note: Patient initially biting down on teaspoon upon first PO trial of puree, however, when re-directed patient was able to participate in swallow assessment. Of Note 2: Patient accepted 3cc of thin liquids and declined further trials by maintaining a tight labial seal and shaking head "no"
1. Mild oral dysphagia for regular solids characterized by adequate oral aperture, with slow and labored mastication and delayed A-P transport. There was mild lingual residue after the primary swallow which clear with a liquid wash and lingual sweep. 2. Functional oral phase for thin liquids and pureed characterized by adequate oral aperture and bolus collection with functional A-P transport. 3. Functional pharyngeal phase for thin liquids, pureed, and regular solids characterized by initiation of the pharyngeal swallow with hyolaryngeal elevation and excursion upon digital palpation. There were no overt signs or symptoms of laryngeal penetration/aspiration.

## 2023-08-01 NOTE — SWALLOW BEDSIDE ASSESSMENT ADULT - COMMENTS
As per MICU Progress Note "Ms Flores is a 59yo Female with a PMHx of bipolar 1 disorder who was admitted to MICU for urgent HD. Improved and medically stable at this time."    7/30 CXR "Clear lungs. No radiodense foreign body/2000 overlying the chest and upper abdomen."    Of note, patient is known to this service, seen for bedside swallow assessment 7/28 with recommendations for pureed with mildly thick liquids at that time. See report for details.     Patient seen for bedside swallow assessment. Received upright in bed, reported feeling tired, denied pain. Patient alert and oriented, able to follow all 1-step directions.

## 2023-08-01 NOTE — PROGRESS NOTE ADULT - ASSESSMENT
Patient is a 58-year-old female with a past psychiatric history of bipolar 1 admitted initially to the MICU for urgent hemodialysis in the setting of lithium toxicity. Patient is stable today.  Patient is a 58-year-old female with a past psychiatric history of bipolar 1 admitted initially to the MICU for urgent hemodialysis in the setting of lithium toxicity. The patient's lithium has been <0.6 since 7/29, and the patient is improving and clinically stable.

## 2023-08-01 NOTE — BH CONSULTATION LIAISON PROGRESS NOTE - NSBHASSESSMENTFT_PSY_ALL_CORE
Patient is a 58F w Bipolar 1 who is here for urgent HD for Lithium toxicity a/w ALICJA/ATN c/b possible UTI. Patient is  but in a relationship, domiciled w/  daughter, her boyfriend, employed as a transport dispatch at [employer], w/ PPHx of bipolar d/o followed in [facility], w/ 4 prior psychiatric hospitalizations most recent one in 2016 at [facility] for "psychotic break" as per daughter, no known suicide attempt or violence hx, no known legal hx, no known substance abuse hx.   Spoke with daughter at bedside. Patient is sedated in ICU.  As per daughter, patient has a dx of bipolar disorder. she has a hx of inpatient admissions but has been doing well since 2016. Patient has been following outpatient with BLAYNE Castorena and compliant with medications.  Patient was recently on haldol and tapered off ( not for s/e , rather not longer psychotic). Has a previous bad s/e from a different antipsychotic but daughter cannot recall name. Daughter states patient has been stressed lately with her other daughter and having the responsibility of taking care of her grandchild, and still working. States patient is independent at baseline.     7/29: Patient still delirious. Requiring PRNs for agitation. Unable to fully assess orientation. Continue 1:1.  7/31: delirium improving, more awake, mild dysmetria to bounds but no tremors, c/w 1:1  8/1: remains confused, requiring less PRN medications, no SI or HI    PLAN  - defer level of observation to primary team - no SI or HI reported - patient remains confused and restless- ES?  - EKG - antipsychotics can only be given if qtc < 500   - agree with primary and tox - HD completed - lithium level 0.8  - continue to HOLD home lithium  - c/w standing Haldol 5mg TID for ongoing agitation and restlessness /delirium - can make PO if patient now tolerating PO medications  - STOP standing ativan  - PRN haldol 5mg q6hrs for breakthrough agitation  - CL will follow Patient is a 58F w Bipolar 1 who is here for urgent HD for Lithium toxicity a/w ALICJA/ATN c/b possible UTI. 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   Spoke with daughter at bedside. Patient is sedated in ICU.  As per daughter, patient has a dx of bipolar disorder. she has a hx of inpatient admissions but has been doing well since 2016. Patient has been following outpatient with BLAYNE Castorena and compliant with medications.  Patient was recently on haldol and tapered off ( not for s/e , rather not longer psychotic). Has a previous bad s/e from a different antipsychotic but daughter cannot recall name. Daughter states patient has been stressed lately with her other daughter and having the responsibility of taking care of her grandchild, and still working. States patient is independent at baseline.     7/29: Patient still delirious. Requiring PRNs for agitation. Unable to fully assess orientation. Continue 1:1.  7/31: delirium improving, more awake, mild dysmetria to bounds but no tremors, c/w 1:1  8/1: remains confused, requiring less PRN medications, no SI or HI    PLAN  - defer level of observation to primary team - no SI or HI reported - patient remains confused and restless- ES?  - EKG - antipsychotics can only be given if qtc < 500   - agree with primary and tox - HD completed - lithium level 0.8  - continue to HOLD home lithium  - c/w standing Haldol 5mg TID for ongoing agitation and restlessness /delirium - can make PO if patient now tolerating PO medications  - STOP standing ativan  - PRN haldol 5mg q6hrs for breakthrough agitation  - CL will follow - discussed dispo with daughter - possible inpatient admission vs outpatient f.u depending on response to medication

## 2023-08-02 LAB
ALBUMIN SERPL ELPH-MCNC: 4.1 G/DL — SIGNIFICANT CHANGE UP (ref 3.3–5)
ALP SERPL-CCNC: 141 U/L — HIGH (ref 40–120)
ALT FLD-CCNC: 54 U/L — HIGH (ref 4–33)
ANION GAP SERPL CALC-SCNC: 14 MMOL/L — SIGNIFICANT CHANGE UP (ref 7–14)
ANION GAP SERPL CALC-SCNC: 15 MMOL/L — HIGH (ref 7–14)
AST SERPL-CCNC: 23 U/L — SIGNIFICANT CHANGE UP (ref 4–32)
BILIRUB SERPL-MCNC: 0.3 MG/DL — SIGNIFICANT CHANGE UP (ref 0.2–1.2)
BUN SERPL-MCNC: 12 MG/DL — SIGNIFICANT CHANGE UP (ref 7–23)
BUN SERPL-MCNC: 14 MG/DL — SIGNIFICANT CHANGE UP (ref 7–23)
CALCIUM SERPL-MCNC: 9.1 MG/DL — SIGNIFICANT CHANGE UP (ref 8.4–10.5)
CALCIUM SERPL-MCNC: 9.4 MG/DL — SIGNIFICANT CHANGE UP (ref 8.4–10.5)
CHLORIDE SERPL-SCNC: 106 MMOL/L — SIGNIFICANT CHANGE UP (ref 98–107)
CHLORIDE SERPL-SCNC: 112 MMOL/L — HIGH (ref 98–107)
CO2 SERPL-SCNC: 21 MMOL/L — LOW (ref 22–31)
CO2 SERPL-SCNC: 22 MMOL/L — SIGNIFICANT CHANGE UP (ref 22–31)
CREAT SERPL-MCNC: 1.19 MG/DL — SIGNIFICANT CHANGE UP (ref 0.5–1.3)
CREAT SERPL-MCNC: 1.19 MG/DL — SIGNIFICANT CHANGE UP (ref 0.5–1.3)
EGFR: 52 ML/MIN/1.73M2 — LOW
EGFR: 52 ML/MIN/1.73M2 — LOW
GLUCOSE SERPL-MCNC: 135 MG/DL — HIGH (ref 70–99)
GLUCOSE SERPL-MCNC: 161 MG/DL — HIGH (ref 70–99)
HCT VFR BLD CALC: 34.2 % — LOW (ref 34.5–45)
HGB BLD-MCNC: 10.9 G/DL — LOW (ref 11.5–15.5)
MAGNESIUM SERPL-MCNC: 1.7 MG/DL — SIGNIFICANT CHANGE UP (ref 1.6–2.6)
MAGNESIUM SERPL-MCNC: 2 MG/DL — SIGNIFICANT CHANGE UP (ref 1.6–2.6)
MCHC RBC-ENTMCNC: 30.6 PG — SIGNIFICANT CHANGE UP (ref 27–34)
MCHC RBC-ENTMCNC: 31.9 GM/DL — LOW (ref 32–36)
MCV RBC AUTO: 96.1 FL — SIGNIFICANT CHANGE UP (ref 80–100)
NRBC # BLD: 0 /100 WBCS — SIGNIFICANT CHANGE UP (ref 0–0)
NRBC # FLD: 0 K/UL — SIGNIFICANT CHANGE UP (ref 0–0)
PHOSPHATE SERPL-MCNC: 2.9 MG/DL — SIGNIFICANT CHANGE UP (ref 2.5–4.5)
PHOSPHATE SERPL-MCNC: 3.5 MG/DL — SIGNIFICANT CHANGE UP (ref 2.5–4.5)
PLATELET # BLD AUTO: 327 K/UL — SIGNIFICANT CHANGE UP (ref 150–400)
POTASSIUM SERPL-MCNC: 3.6 MMOL/L — SIGNIFICANT CHANGE UP (ref 3.5–5.3)
POTASSIUM SERPL-MCNC: 4 MMOL/L — SIGNIFICANT CHANGE UP (ref 3.5–5.3)
POTASSIUM SERPL-SCNC: 3.6 MMOL/L — SIGNIFICANT CHANGE UP (ref 3.5–5.3)
POTASSIUM SERPL-SCNC: 4 MMOL/L — SIGNIFICANT CHANGE UP (ref 3.5–5.3)
PROT SERPL-MCNC: 7.2 G/DL — SIGNIFICANT CHANGE UP (ref 6–8.3)
RBC # BLD: 3.56 M/UL — LOW (ref 3.8–5.2)
RBC # FLD: 15.5 % — HIGH (ref 10.3–14.5)
SODIUM SERPL-SCNC: 143 MMOL/L — SIGNIFICANT CHANGE UP (ref 135–145)
SODIUM SERPL-SCNC: 147 MMOL/L — HIGH (ref 135–145)
WBC # BLD: 10.34 K/UL — SIGNIFICANT CHANGE UP (ref 3.8–10.5)
WBC # FLD AUTO: 10.34 K/UL — SIGNIFICANT CHANGE UP (ref 3.8–10.5)

## 2023-08-02 PROCEDURE — 99232 SBSQ HOSP IP/OBS MODERATE 35: CPT

## 2023-08-02 PROCEDURE — 99233 SBSQ HOSP IP/OBS HIGH 50: CPT | Mod: GC

## 2023-08-02 RX ADMIN — HALOPERIDOL DECANOATE 5 MILLIGRAM(S): 100 INJECTION INTRAMUSCULAR at 21:11

## 2023-08-02 RX ADMIN — HEPARIN SODIUM 5000 UNIT(S): 5000 INJECTION INTRAVENOUS; SUBCUTANEOUS at 05:59

## 2023-08-02 RX ADMIN — HEPARIN SODIUM 5000 UNIT(S): 5000 INJECTION INTRAVENOUS; SUBCUTANEOUS at 21:11

## 2023-08-02 RX ADMIN — HALOPERIDOL DECANOATE 5 MILLIGRAM(S): 100 INJECTION INTRAMUSCULAR at 14:16

## 2023-08-02 RX ADMIN — HEPARIN SODIUM 5000 UNIT(S): 5000 INJECTION INTRAVENOUS; SUBCUTANEOUS at 14:16

## 2023-08-02 RX ADMIN — HALOPERIDOL DECANOATE 5 MILLIGRAM(S): 100 INJECTION INTRAMUSCULAR at 06:01

## 2023-08-02 RX ADMIN — CHLORHEXIDINE GLUCONATE 1 APPLICATION(S): 213 SOLUTION TOPICAL at 14:15

## 2023-08-02 NOTE — BH CONSULTATION LIAISON PROGRESS NOTE - NSBHASSESSMENTFT_PSY_ALL_CORE
Patient is a 58F w Bipolar 1 who is here for urgent HD for Lithium toxicity a/w ALICJA/ATN c/b possible UTI. 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   Spoke with daughter at bedside. Patient is sedated in ICU.  As per daughter, patient has a dx of bipolar disorder. she has a hx of inpatient admissions but has been doing well since 2016. Patient has been following outpatient with BLAYNE Castorena and compliant with medications.  Patient was recently on haldol and tapered off ( not for s/e , rather not longer psychotic). Has a previous bad s/e from a different antipsychotic but daughter cannot recall name. Daughter states patient has been stressed lately with her other daughter and having the responsibility of taking care of her grandchild, and still working. States patient is independent at baseline.     7/29: Patient still delirious. Requiring PRNs for agitation. Unable to fully assess orientation. Continue 1:1.  7/31: delirium improving, more awake, mild dysmetria to bounds but no tremors, c/w 1:1  8/1: remains confused, requiring less PRN medications, no SI or HI  8/2: confusion improving, no SI/HI, no EPS noted, c/w meds as below, tolerating cessation of ativan     PLAN  - defer level of observation to primary team - no SI or HI reported - patient remains confused and restless- ES?  - EKG - antipsychotics can only be given if qtc < 500   - agree with primary and tox - HD completed - lithium level 0.8  - continue to HOLD home lithium  - c/w standing Haldol 5mg TID for ongoing agitation and restlessness /delirium - can make PO if patient now tolerating PO medications  - STOP standing ativan  - PRN haldol 5mg q6hrs for breakthrough agitation  - CL will follow - discussed dispo with daughter - possible inpatient admission vs outpatient f.u depending on response to medication

## 2023-08-02 NOTE — BH CONSULTATION LIAISON PROGRESS NOTE - NSBHINDICATION_PSY_ALL_CORE
agitation  unable to assess for SI/HI - CL will follow 
agitation  unable to assess for SI/HI - CL will follow 
agitation, confusion   no SI or HI
agitation, confusion   no SI or HI
agitation  unable to assess for SI/HI - CL will follow

## 2023-08-02 NOTE — PROGRESS NOTE ADULT - PROBLEM SELECTOR PLAN 4
Patient lost two bottom teeth 7/28 due to biting bed railing in episode of agitation.   -Dental saw the patient today, but said they were unable to obtain consent for an x-ray; need to follow-up with patient's daughter   -XR did not show aspiration or swallowing of teeth Patient lost two bottom teeth 7/28 due to biting bed railing in episode of agitation.   -Dental saw the patient today, but said they were unable to obtain consent for an x-ray; they will follow-up with patient's daughter   -XR did not show aspiration or swallowing of teeth

## 2023-08-02 NOTE — PROGRESS NOTE ADULT - ASSESSMENT
Patient is a 58-year-old female with a past psychiatric history of bipolar 1 admitted initially to the MICU for urgent hemodialysis in the setting of lithium toxicity. The patient's lithium has been <0.6 since 7/29, and the patient is improving and clinically stable.

## 2023-08-02 NOTE — PROGRESS NOTE ADULT - SUBJECTIVE AND OBJECTIVE BOX
Hannah López, MS4    PROGRESS NOTE:     Patient is a 58y old  Female who presents with a chief complaint of AMS (01 Aug 2023 13:21)      SUBJECTIVE / OVERNIGHT EVENTS: No acute overnight events. Patient is awake, alert, and asking about discharge. She is unable to walk around without assistance, and was able to walk to the bathroom with a nurse's help. Otherwise, no acute complaints.        MEDICATIONS  (STANDING):  chlorhexidine 2% Cloths 1 Application(s) Topical daily  haloperidol    Injectable 5 milliGRAM(s) IntraMuscular three times a day  heparin   Injectable 5000 Unit(s) SubCutaneous every 8 hours    MEDICATIONS  (PRN):  acetaminophen     Tablet .. 650 milliGRAM(s) Oral every 6 hours PRN Temp greater or equal to 38C (100.4F), Mild Pain (1 - 3)  haloperidol    Injectable 5 milliGRAM(s) IntraMuscular every 6 hours PRN Agitation      CAPILLARY BLOOD GLUCOSE        I&O's Summary    01 Aug 2023 07:01  -  02 Aug 2023 07:00  --------------------------------------------------------  IN: 530 mL / OUT: 1400 mL / NET: -870 mL        PHYSICAL EXAM:  Vital Signs Last 24 Hrs  T(C): 36.7 (02 Aug 2023 05:20), Max: 36.9 (01 Aug 2023 14:54)  T(F): 98 (02 Aug 2023 05:20), Max: 98.5 (01 Aug 2023 14:54)  HR: 101 (02 Aug 2023 05:20) (90 - 101)  BP: 134/71 (02 Aug 2023 05:20) (127/72 - 148/74)  BP(mean): --  RR: 17 (02 Aug 2023 05:20) (17 - 18)  SpO2: 100% (02 Aug 2023 05:20) (100% - 100%)    Parameters below as of 02 Aug 2023 05:20  Patient On (Oxygen Delivery Method): room air      GENERAL: No acute distress, well-appearing  CHEST/LUNG: CTAB; No wheezes, rales, or rhonchi  HEART: Regular rate and rhythm; Normal s1 and s2, No murmurs, rubs, or gallops  ABDOMEN: Soft, non-tender, non-distended  EXTREMITIES:  2+ peripheral pulses b/l, minimal b/l LE edema, tenderness in LLE to palpation   NEUROLOGY: A&O x 3, no focal deficits  SKIN: No rashes or lesions      LABS:                        10.9   10.34 )-----------( 327      ( 02 Aug 2023 05:30 )             34.2     08-02    147<H>  |  112<H>  |  14  ----------------------------<  135<H>  4.0   |  21<L>  |  1.19    Ca    9.4      02 Aug 2023 05:30  Phos  3.5     08-02  Mg     2.00     08-02    TPro  7.2  /  Alb  4.1  /  TBili  0.3  /  DBili  x   /  AST  23  /  ALT  54<H>  /  AlkPhos  141<H>  08-02          Urinalysis Basic - ( 02 Aug 2023 05:30 )    Color: x / Appearance: x / SG: x / pH: x  Gluc: 135 mg/dL / Ketone: x  / Bili: x / Urobili: x   Blood: x / Protein: x / Nitrite: x   Leuk Esterase: x / RBC: x / WBC x   Sq Epi: x / Non Sq Epi: x / Bacteria: x          RADIOLOGY & ADDITIONAL TESTS:  Results Reviewed:   Imaging Personally Reviewed:  Electrocardiogram Personally Reviewed:   Hannah López, MS4    PROGRESS NOTE:     Patient is a 58y old  Female who presents with a chief complaint of AMS (01 Aug 2023 13:21)      SUBJECTIVE / OVERNIGHT EVENTS: No acute overnight events. Patient is awake, alert, and asking about discharge. She is unable to walk around without assistance, but was able to walk to the bathroom with a nurse's help. Endorses decreased appetite, but otherwise no acute complaints.        MEDICATIONS  (STANDING):  chlorhexidine 2% Cloths 1 Application(s) Topical daily  haloperidol    Injectable 5 milliGRAM(s) IntraMuscular three times a day  heparin   Injectable 5000 Unit(s) SubCutaneous every 8 hours    MEDICATIONS  (PRN):  acetaminophen     Tablet .. 650 milliGRAM(s) Oral every 6 hours PRN Temp greater or equal to 38C (100.4F), Mild Pain (1 - 3)  haloperidol    Injectable 5 milliGRAM(s) IntraMuscular every 6 hours PRN Agitation      CAPILLARY BLOOD GLUCOSE        I&O's Summary    01 Aug 2023 07:01  -  02 Aug 2023 07:00  --------------------------------------------------------  IN: 530 mL / OUT: 1400 mL / NET: -870 mL        PHYSICAL EXAM:  Vital Signs Last 24 Hrs  T(C): 36.7 (02 Aug 2023 05:20), Max: 36.9 (01 Aug 2023 14:54)  T(F): 98 (02 Aug 2023 05:20), Max: 98.5 (01 Aug 2023 14:54)  HR: 101 (02 Aug 2023 05:20) (90 - 101)  BP: 134/71 (02 Aug 2023 05:20) (127/72 - 148/74)  BP(mean): --  RR: 17 (02 Aug 2023 05:20) (17 - 18)  SpO2: 100% (02 Aug 2023 05:20) (100% - 100%)    Parameters below as of 02 Aug 2023 05:20  Patient On (Oxygen Delivery Method): room air      GENERAL: No acute distress, well-appearing  CHEST/LUNG: CTAB; No wheezes, rales, or rhonchi  HEART: Regular rate and rhythm; Normal s1 and s2, No murmurs, rubs, or gallops  ABDOMEN: Soft, non-tender, non-distended  EXTREMITIES:  2+ peripheral pulses b/l, minimal b/l LE edema, tenderness in LLE to palpation   NEUROLOGY: A&O x 3, no focal deficits  SKIN: No rashes or lesions      LABS:                        10.9   10.34 )-----------( 327      ( 02 Aug 2023 05:30 )             34.2     08-02    147<H>  |  112<H>  |  14  ----------------------------<  135<H>  4.0   |  21<L>  |  1.19    Ca    9.4      02 Aug 2023 05:30  Phos  3.5     08-02  Mg     2.00     08-02    TPro  7.2  /  Alb  4.1  /  TBili  0.3  /  DBili  x   /  AST  23  /  ALT  54<H>  /  AlkPhos  141<H>  08-02          Urinalysis Basic - ( 02 Aug 2023 05:30 )    Color: x / Appearance: x / SG: x / pH: x  Gluc: 135 mg/dL / Ketone: x  / Bili: x / Urobili: x   Blood: x / Protein: x / Nitrite: x   Leuk Esterase: x / RBC: x / WBC x   Sq Epi: x / Non Sq Epi: x / Bacteria: x

## 2023-08-03 ENCOUNTER — TRANSCRIPTION ENCOUNTER (OUTPATIENT)
Age: 58
End: 2023-08-03

## 2023-08-03 LAB
ALBUMIN SERPL ELPH-MCNC: 3.9 G/DL — SIGNIFICANT CHANGE UP (ref 3.3–5)
ALP SERPL-CCNC: 140 U/L — HIGH (ref 40–120)
ALT FLD-CCNC: 45 U/L — HIGH (ref 4–33)
ANION GAP SERPL CALC-SCNC: 15 MMOL/L — HIGH (ref 7–14)
AST SERPL-CCNC: 26 U/L — SIGNIFICANT CHANGE UP (ref 4–32)
BILIRUB SERPL-MCNC: 0.2 MG/DL — SIGNIFICANT CHANGE UP (ref 0.2–1.2)
BUN SERPL-MCNC: 10 MG/DL — SIGNIFICANT CHANGE UP (ref 7–23)
CALCIUM SERPL-MCNC: 9.4 MG/DL — SIGNIFICANT CHANGE UP (ref 8.4–10.5)
CHLORIDE SERPL-SCNC: 107 MMOL/L — SIGNIFICANT CHANGE UP (ref 98–107)
CO2 SERPL-SCNC: 22 MMOL/L — SIGNIFICANT CHANGE UP (ref 22–31)
CREAT SERPL-MCNC: 1.23 MG/DL — SIGNIFICANT CHANGE UP (ref 0.5–1.3)
EGFR: 50 ML/MIN/1.73M2 — LOW
GLUCOSE SERPL-MCNC: 121 MG/DL — HIGH (ref 70–99)
HCT VFR BLD CALC: 34 % — LOW (ref 34.5–45)
HGB BLD-MCNC: 10.9 G/DL — LOW (ref 11.5–15.5)
MAGNESIUM SERPL-MCNC: 1.8 MG/DL — SIGNIFICANT CHANGE UP (ref 1.6–2.6)
MCHC RBC-ENTMCNC: 31.3 PG — SIGNIFICANT CHANGE UP (ref 27–34)
MCHC RBC-ENTMCNC: 32.1 GM/DL — SIGNIFICANT CHANGE UP (ref 32–36)
MCV RBC AUTO: 97.7 FL — SIGNIFICANT CHANGE UP (ref 80–100)
NRBC # BLD: 0 /100 WBCS — SIGNIFICANT CHANGE UP (ref 0–0)
NRBC # FLD: 0 K/UL — SIGNIFICANT CHANGE UP (ref 0–0)
PHOSPHATE SERPL-MCNC: 3.2 MG/DL — SIGNIFICANT CHANGE UP (ref 2.5–4.5)
PLATELET # BLD AUTO: 329 K/UL — SIGNIFICANT CHANGE UP (ref 150–400)
POTASSIUM SERPL-MCNC: 3.7 MMOL/L — SIGNIFICANT CHANGE UP (ref 3.5–5.3)
POTASSIUM SERPL-SCNC: 3.7 MMOL/L — SIGNIFICANT CHANGE UP (ref 3.5–5.3)
PROT SERPL-MCNC: 7 G/DL — SIGNIFICANT CHANGE UP (ref 6–8.3)
RBC # BLD: 3.48 M/UL — LOW (ref 3.8–5.2)
RBC # FLD: 15.1 % — HIGH (ref 10.3–14.5)
SODIUM SERPL-SCNC: 144 MMOL/L — SIGNIFICANT CHANGE UP (ref 135–145)
WBC # BLD: 8.61 K/UL — SIGNIFICANT CHANGE UP (ref 3.8–10.5)
WBC # FLD AUTO: 8.61 K/UL — SIGNIFICANT CHANGE UP (ref 3.8–10.5)

## 2023-08-03 PROCEDURE — 99232 SBSQ HOSP IP/OBS MODERATE 35: CPT | Mod: GC

## 2023-08-03 PROCEDURE — 99232 SBSQ HOSP IP/OBS MODERATE 35: CPT

## 2023-08-03 PROCEDURE — 93010 ELECTROCARDIOGRAM REPORT: CPT

## 2023-08-03 RX ORDER — HALOPERIDOL DECANOATE 100 MG/ML
2 INJECTION INTRAMUSCULAR EVERY 8 HOURS
Refills: 0 | Status: DISCONTINUED | OUTPATIENT
Start: 2023-08-03 | End: 2023-08-03

## 2023-08-03 RX ORDER — BENZTROPINE MESYLATE 1 MG
0.5 TABLET ORAL ONCE
Refills: 0 | Status: COMPLETED | OUTPATIENT
Start: 2023-08-03 | End: 2023-08-03

## 2023-08-03 RX ORDER — HALOPERIDOL DECANOATE 100 MG/ML
2.5 INJECTION INTRAMUSCULAR EVERY 8 HOURS
Refills: 0 | Status: DISCONTINUED | OUTPATIENT
Start: 2023-08-03 | End: 2023-08-03

## 2023-08-03 RX ORDER — HALOPERIDOL DECANOATE 100 MG/ML
5 INJECTION INTRAMUSCULAR EVERY 8 HOURS
Refills: 0 | Status: DISCONTINUED | OUTPATIENT
Start: 2023-08-03 | End: 2023-08-04

## 2023-08-03 RX ORDER — BENZTROPINE MESYLATE 1 MG
0.5 TABLET ORAL EVERY 12 HOURS
Refills: 0 | Status: DISCONTINUED | OUTPATIENT
Start: 2023-08-03 | End: 2023-08-04

## 2023-08-03 RX ADMIN — HEPARIN SODIUM 5000 UNIT(S): 5000 INJECTION INTRAVENOUS; SUBCUTANEOUS at 21:25

## 2023-08-03 RX ADMIN — HEPARIN SODIUM 5000 UNIT(S): 5000 INJECTION INTRAVENOUS; SUBCUTANEOUS at 14:02

## 2023-08-03 RX ADMIN — Medication 0.5 MILLIGRAM(S): at 21:25

## 2023-08-03 RX ADMIN — Medication 0.5 MILLIGRAM(S): at 16:15

## 2023-08-03 RX ADMIN — HALOPERIDOL DECANOATE 5 MILLIGRAM(S): 100 INJECTION INTRAMUSCULAR at 05:45

## 2023-08-03 RX ADMIN — HALOPERIDOL DECANOATE 5 MILLIGRAM(S): 100 INJECTION INTRAMUSCULAR at 21:25

## 2023-08-03 RX ADMIN — HALOPERIDOL DECANOATE 5 MILLIGRAM(S): 100 INJECTION INTRAMUSCULAR at 13:58

## 2023-08-03 RX ADMIN — HEPARIN SODIUM 5000 UNIT(S): 5000 INJECTION INTRAVENOUS; SUBCUTANEOUS at 05:45

## 2023-08-03 RX ADMIN — CHLORHEXIDINE GLUCONATE 1 APPLICATION(S): 213 SOLUTION TOPICAL at 14:00

## 2023-08-03 NOTE — BH CONSULTATION LIAISON PROGRESS NOTE - NSBHATTESTBILLING_PSY_A_CORE
34629-Zpzjjqyqwg OBS or IP - moderate complexity OR 35-49 mins
Billing in another system
Non-billable
26620-Epymagicgt OBS or IP - moderate complexity OR 35-49 mins

## 2023-08-03 NOTE — BH CONSULTATION LIAISON PROGRESS NOTE - NSBHCHARTREVIEWLAB_PSY_A_CORE FT
CBC Full  -  ( 03 Aug 2023 06:45 )  WBC Count : 8.61 K/uL  RBC Count : 3.48 M/uL  Hemoglobin : 10.9 g/dL  Hematocrit : 34.0 %  Platelet Count - Automated : 329 K/uL  Mean Cell Volume : 97.7 fL  Mean Cell Hemoglobin : 31.3 pg  Mean Cell Hemoglobin Concentration : 32.1 gm/dL  Auto Neutrophil # : x  Auto Lymphocyte # : x  Auto Monocyte # : x  Auto Eosinophil # : x  Auto Basophil # : x  Auto Neutrophil % : x  Auto Lymphocyte % : x  Auto Monocyte % : x  Auto Eosinophil % : x  Auto Basophil % : x  08-03    144  |  107  |  10  ----------------------------<  121<H>  3.7   |  22  |  1.23    Ca    9.4      03 Aug 2023 06:45  Phos  3.2     08-03  Mg     1.80     08-03    TPro  7.0  /  Alb  3.9  /  TBili  0.2  /  DBili  x   /  AST  26  /  ALT  45<H>  /  AlkPhos  140<H>  08-03  
                      10.8   8.76  )-----------( 183      ( 31 Jul 2023 00:15 )             34.4   07-31    145  |  112<H>  |  9   ----------------------------<  113<H>  3.8   |  20<L>  |  1.09    Ca    9.4      31 Jul 2023 20:10  Phos  3.3     07-31  Mg     1.90     07-31    TPro  7.0  /  Alb  3.9  /  TBili  0.4  /  DBili  x   /  AST  39<H>  /  ALT  72<H>  /  AlkPhos  134<H>  07-31    
                      9.4    9.21  )-----------( 132      ( 28 Jul 2023 00:25 )             29.3   07-28    149<H>  |  113<H>  |  8   ----------------------------<  118<H>  3.8   |  26  |  1.08    Ca    8.4      28 Jul 2023 08:18  Phos  2.3     07-28  Mg     2.20     07-28    TPro  5.8<L>  /  Alb  3.0<L>  /  TBili  0.3  /  DBili  x   /  AST  14  /  ALT  14  /  AlkPhos  76  07-28  
                      10.8   8.76  )-----------( 183      ( 31 Jul 2023 00:15 )             34.4   07-31    145  |  112<H>  |  9   ----------------------------<  113<H>  3.8   |  20<L>  |  1.09    Ca    9.4      31 Jul 2023 20:10  Phos  3.3     07-31  Mg     1.90     07-31    TPro  7.0  /  Alb  3.9  /  TBili  0.4  /  DBili  x   /  AST  39<H>  /  ALT  72<H>  /  AlkPhos  134<H>  07-31    
                      9.7    6.73  )-----------( 138      ( 29 Jul 2023 04:00 )             30.5     07-29    151<H>  |  118<H>  |  9   ----------------------------<  167<H>  3.9   |  24  |  1.14    Ca    8.7      29 Jul 2023 04:00  Phos  2.7     07-29  Mg     2.40     07-29    TPro  5.8<L>  /  Alb  3.1<L>  /  TBili  0.2  /  DBili  x   /  AST  12  /  ALT  7   /  AlkPhos  73  07-29  
                      9.7    6.73  )-----------( 138      ( 29 Jul 2023 04:00 )             30.5     07-29    151<H>  |  118<H>  |  9   ----------------------------<  167<H>  3.9   |  24  |  1.14    Ca    8.7      29 Jul 2023 04:00  Phos  2.7     07-29  Mg     2.40     07-29    TPro  5.8<L>  /  Alb  3.1<L>  /  TBili  0.2  /  DBili  x   /  AST  12  /  ALT  7   /  AlkPhos  73  07-29

## 2023-08-03 NOTE — BH CONSULTATION LIAISON PROGRESS NOTE - NSBHCONSFOLLOWNEEDS_PSY_ALL_CORE
Needs further psych safety assessment prior to discharge
No psychiatric contraindications to discharge

## 2023-08-03 NOTE — DISCHARGE NOTE NURSING/CASE MANAGEMENT/SOCIAL WORK - NSDCPEFALRISK_GEN_ALL_CORE
For information on Fall & Injury Prevention, visit: [email or web address] OR  [email or web address] OR  https://www.cdc.gov/steadi/patient.html

## 2023-08-03 NOTE — BH CONSULTATION LIAISON PROGRESS NOTE - NSBHATTESTTYPEVISIT_PSY_A_CORE
ALEKSEY without on-site Attending supervision
ALEKSEY without on-site Attending supervision
Attending Only
Resident/Fellow with telephonic supervision
Attending Only
ALEKSEY without on-site Attending supervision

## 2023-08-03 NOTE — BH CONSULTATION LIAISON PROGRESS NOTE - NSBHMSESPEECH_PSY_A_CORE
Abnormal as indicated, otherwise normal...
Normal volume, rate, productivity, spontaneity and articulation
Abnormal as indicated, otherwise normal...

## 2023-08-03 NOTE — BH CONSULTATION LIAISON PROGRESS NOTE - NSBHCONSULTFOLLOWAFTERCARE_PSY_A_CORE FT
f/u [facility] Outpatient provider, Julia Castorena    [facility]   [phone] [place], [place] [address] [phone]

## 2023-08-03 NOTE — DISCHARGE NOTE PROVIDER - NSDCCPCAREPLAN_GEN_ALL_CORE_FT
PRINCIPAL DISCHARGE DIAGNOSIS  Diagnosis: Lithium toxicity  Assessment and Plan of Treatment:       SECONDARY DISCHARGE DIAGNOSES  Diagnosis: Diabetes insipidus  Assessment and Plan of Treatment:     Diagnosis: Tooth loss  Assessment and Plan of Treatment:     Diagnosis: Bipolar 1 disorder  Assessment and Plan of Treatment:     Diagnosis: Bacterial UTI  Assessment and Plan of Treatment:      PRINCIPAL DISCHARGE DIAGNOSIS  Diagnosis: Lithium toxicity  Assessment and Plan of Treatment: You were admitted to the hospital with a high lithium level. You underwent 2 sessions of hemodialysis to bring down the lithium level in your blood.      SECONDARY DISCHARGE DIAGNOSES  Diagnosis: Diabetes insipidus  Assessment and Plan of Treatment: The high levels of lithium in your blood were causing you to excrete too much urine. You were started on a medication called hydrochlorothiazide, which helped decrease the amount of urine you were excreting.    Diagnosis: Tooth loss  Assessment and Plan of Treatment: On 7/28, you lost two teeth when you bit the bed railing.    Diagnosis: Bipolar 1 disorder  Assessment and Plan of Treatment:     Diagnosis: Bacterial UTI  Assessment and Plan of Treatment: You were found to have an infection in your urine. You completed a 3-day course of antibiotics for this.     PRINCIPAL DISCHARGE DIAGNOSIS  Diagnosis: Lithium toxicity  Assessment and Plan of Treatment: You were admitted to the hospital with a high lithium level. You underwent 2 sessions of hemodialysis to bring down the lithium level in your blood. You were seen by the psychiatry team as an inpatient in the hospital. They changed your medications and are recommending you hold lithium until you follow up with your outpatient psychiatry provider. Please continue to take the haloperidol, 5 mg 3x a day, and benztropine 0.5 mg every 12 hours. Please follow up with your psychiatry provider for further management.      SECONDARY DISCHARGE DIAGNOSES  Diagnosis: Bipolar 1 disorder  Assessment and Plan of Treatment:     Diagnosis: Diabetes insipidus  Assessment and Plan of Treatment: The high levels of lithium in your blood were causing you to excrete too much urine. You were started on a medication called hydrochlorothiazide, which helped decrease the amount of urine you were excreting. Please continue to take the hydrochlorothiazide, 12.5 mg daily and follow up with your primary care doctor within 1 week for repeat BMP testing to evaluate your creatinine and electrolyte levels and decide on further treatment.    Diagnosis: Tooth loss  Assessment and Plan of Treatment: On 7/28, you lost two teeth when you bit the bed railing.    Diagnosis: Bacterial UTI  Assessment and Plan of Treatment: You were found to have an infection in your urine. You completed a 3-day course of antibiotics for this.

## 2023-08-03 NOTE — DISCHARGE NOTE PROVIDER - NSDCMRMEDTOKEN_GEN_ALL_CORE_FT
Albuterol (Eqv-ProAir HFA) 90 mcg/inh inhalation aerosol: 2 inhaled  budesonide-formoterol 80 mcg-4.5 mcg/inh inhalation aerosol: 2 inhaled  LITHIUM CARBONATE  MG TB:    Albuterol (Eqv-ProAir HFA) 90 mcg/inh inhalation aerosol: 2 inhaled  benztropine 0.5 mg oral tablet: 1 tab(s) orally every 12 hours  budesonide-formoterol 80 mcg-4.5 mcg/inh inhalation aerosol: 2 inhaled  haloperidol 5 mg oral tablet: 1 tab(s) orally every 8 hours  hydroCHLOROthiazide 12.5 mg oral capsule: 1 cap(s) orally once a day

## 2023-08-03 NOTE — DISCHARGE NOTE PROVIDER - CARE PROVIDER_API CALL
Ester Castorena  NP in Psychiatry  [address] [place] [address]  Phone: [phone]  Fax: [phone]  Established Patient  Follow Up Time: 1 week   Ester Castorena  NP in Psychiatry  [address] [place] [address]  Phone: [phone]  Fax: [phone]  Established Patient  Follow Up Time: 1 week    Your Primary Care Doctor,   Phone: (   )    -  Fax: (   )    -  Established Patient  Follow Up Time: 1 week

## 2023-08-03 NOTE — PROGRESS NOTE ADULT - ASSESSMENT
Patient is a 58-year-old female with a past psychiatric history of bipolar 1 admitted initially to the MICU for urgent hemodialysis in the setting of lithium toxicity. The patient's lithium has been <0.6 since 7/29, and the patient is improving and clinically stable. Today on exam, the patient was out of bed, her speech was more clear, and she was well-appearing.

## 2023-08-03 NOTE — DISCHARGE NOTE NURSING/CASE MANAGEMENT/SOCIAL WORK - PATIENT PORTAL LINK FT
You can access the FollowMyHealth Patient Portal offered by [facility] by registering at the following website: [email or web address]. By joining [employer]’s FollowMyHealth portal, you will also be able to view your health information using other applications (apps) compatible with our system.

## 2023-08-03 NOTE — BH CONSULTATION LIAISON PROGRESS NOTE - CURRENT MEDICATION
MEDICATIONS  (STANDING):  chlorhexidine 2% Cloths 1 Application(s) Topical daily  dexMEDEtomidine Infusion 0.2 MICROgram(s)/kG/Hr (3.87 mL/Hr) IV Continuous <Continuous>  dextrose 5%. 1000 milliLiter(s) (75 mL/Hr) IV Continuous <Continuous>  heparin   Injectable 5000 Unit(s) SubCutaneous every 8 hours    MEDICATIONS  (PRN):  haloperidol    Injectable 2.5 milliGRAM(s) IntraMuscular every 6 hours PRN Agitation  
MEDICATIONS  (STANDING):  chlorhexidine 2% Cloths 1 Application(s) Topical daily  haloperidol    Injectable 5 milliGRAM(s) IntraMuscular three times a day  heparin   Injectable 5000 Unit(s) SubCutaneous every 8 hours    MEDICATIONS  (PRN):  acetaminophen     Tablet .. 650 milliGRAM(s) Oral every 6 hours PRN Temp greater or equal to 38C (100.4F), Mild Pain (1 - 3)  haloperidol    Injectable 5 milliGRAM(s) IntraMuscular every 6 hours PRN Agitation  
MEDICATIONS  (STANDING):  chlorhexidine 2% Cloths 1 Application(s) Topical daily  haloperidol    Injectable 5 milliGRAM(s) IntraMuscular three times a day  heparin   Injectable 5000 Unit(s) SubCutaneous every 8 hours  LORazepam   Injectable 0.5 milliGRAM(s) IV Push two times a day    MEDICATIONS  (PRN):  acetaminophen     Tablet .. 650 milliGRAM(s) Oral every 6 hours PRN Temp greater or equal to 38C (100.4F), Mild Pain (1 - 3)  haloperidol    Injectable 5 milliGRAM(s) IntraMuscular every 6 hours PRN Agitation  
MEDICATIONS  (STANDING):  chlorhexidine 2% Cloths 1 Application(s) Topical daily  dexMEDEtomidine Infusion 0.2 MICROgram(s)/kG/Hr (3.87 mL/Hr) IV Continuous <Continuous>  dextrose 5%. 1000 milliLiter(s) (175 mL/Hr) IV Continuous <Continuous>  haloperidol    Injectable 2.5 milliGRAM(s) IntraMuscular three times a day  heparin   Injectable 5000 Unit(s) SubCutaneous every 8 hours    MEDICATIONS  (PRN):  haloperidol    Injectable 5 milliGRAM(s) IntraMuscular every 6 hours PRN Agitation  
MEDICATIONS  (STANDING):  chlorhexidine 2% Cloths 1 Application(s) Topical daily  haloperidol    Injectable 5 milliGRAM(s) IntraMuscular three times a day  heparin   Injectable 5000 Unit(s) SubCutaneous every 8 hours  LORazepam   Injectable 0.5 milliGRAM(s) IV Push two times a day    MEDICATIONS  (PRN):  haloperidol    Injectable 5 milliGRAM(s) IntraMuscular every 6 hours PRN Agitation  
MEDICATIONS  (STANDING):  chlorhexidine 2% Cloths 1 Application(s) Topical daily  haloperidol     Tablet 5 milliGRAM(s) Oral every 8 hours  heparin   Injectable 5000 Unit(s) SubCutaneous every 8 hours  hydrochlorothiazide 12.5 milliGRAM(s) Oral daily    MEDICATIONS  (PRN):  acetaminophen     Tablet .. 650 milliGRAM(s) Oral every 6 hours PRN Temp greater or equal to 38C (100.4F), Mild Pain (1 - 3)  haloperidol    Injectable 5 milliGRAM(s) IntraMuscular every 6 hours PRN Agitation

## 2023-08-03 NOTE — BH CONSULTATION LIAISON PROGRESS NOTE - NSBHADMITIPOBS_PSY_A_CORE
Constant observation
Enhanced supervision
Constant observation
Enhanced supervision
Constant observation
Routine observation

## 2023-08-03 NOTE — PROGRESS NOTE ADULT - SUBJECTIVE AND OBJECTIVE BOX
Hannah López, MS4    PROGRESS NOTE:    ID #:     Patient is a 58y old  Female who presents with a chief complaint of AMS (02 Aug 2023 08:54)      MAJOR INTERVAL HOSPITAL EVENTS:     SUBJECTIVE / OVERNIGHT EVENTS: No acute overnight events. Patient was sitting up in a chair. Her speech was more clear today and her thoughts were less clouded. She has been able to walk to the bathroom without the use of a walker with no reported falls; however, she still uses the walker when walking in the hallway. She doesn't like hospital food, and has been eating soups and porridges that her daughter brings in, and has been drinking more fluids. She denies nausea/vomiting, constipation/diarrhea. She denies any pain in her leg today. She has no acute complaints.       MEDICATIONS  (STANDING):  chlorhexidine 2% Cloths 1 Application(s) Topical daily  haloperidol    Injectable 2.5 milliGRAM(s) IntraMuscular every 8 hours  heparin   Injectable 5000 Unit(s) SubCutaneous every 8 hours  hydrochlorothiazide 12.5 milliGRAM(s) Oral daily    MEDICATIONS  (PRN):  acetaminophen     Tablet .. 650 milliGRAM(s) Oral every 6 hours PRN Temp greater or equal to 38C (100.4F), Mild Pain (1 - 3)  haloperidol    Injectable 5 milliGRAM(s) IntraMuscular every 6 hours PRN Agitation      CAPILLARY BLOOD GLUCOSE        I&O's Summary      PHYSICAL EXAM:  Vital Signs Last 24 Hrs  T(C): 36.9 (03 Aug 2023 05:39), Max: 37.1 (02 Aug 2023 21:08)  T(F): 98.4 (03 Aug 2023 05:39), Max: 98.8 (02 Aug 2023 21:08)  HR: 96 (03 Aug 2023 05:39) (83 - 100)  BP: 139/67 (03 Aug 2023 05:39) (131/72 - 139/67)  BP(mean): --  RR: 17 (03 Aug 2023 05:39) (16 - 18)  SpO2: 95% (03 Aug 2023 05:39) (95% - 100%)    Parameters below as of 03 Aug 2023 05:39  Patient On (Oxygen Delivery Method): room air        GENERAL: No acute distress, well-appearing  CHEST/LUNG: CTAB; No wheezes, rales, or rhonchi  HEART: Regular rate and rhythm; Normal s1 and s2, No murmurs, rubs, or gallops  ABDOMEN: Soft, non-tender, non-distended  EXTREMITIES:  2+ peripheral pulses b/l, mild peripheral edema b/l   NEUROLOGY: A&O x 3, no focal deficits      LABS:                        10.9   8.61  )-----------( 329      ( 03 Aug 2023 06:45 )             34.0     08-03    144  |  107  |  10  ----------------------------<  121<H>  3.7   |  22  |  1.23    Ca    9.4      03 Aug 2023 06:45  Phos  3.2     08-03  Mg     1.80     08-03    TPro  7.0  /  Alb  3.9  /  TBili  0.2  /  DBili  x   /  AST  26  /  ALT  45<H>  /  AlkPhos  140<H>  08-03          Urinalysis Basic - ( 03 Aug 2023 06:45 )    Color: x / Appearance: x / SG: x / pH: x  Gluc: 121 mg/dL / Ketone: x  / Bili: x / Urobili: x   Blood: x / Protein: x / Nitrite: x   Leuk Esterase: x / RBC: x / WBC x   Sq Epi: x / Non Sq Epi: x / Bacteria: x     Hannah López, MS4    PROGRESS NOTE:     Patient is a 58y old  Female who presents with a chief complaint of AMS (02 Aug 2023 08:54)      MAJOR INTERVAL HOSPITAL EVENTS:     SUBJECTIVE / OVERNIGHT EVENTS: No acute overnight events. Patient was sitting up in a chair. Her speech was more clear today and her thoughts were less clouded. She has been able to walk to the bathroom without the use of a walker with no reported falls; however, she still uses the walker when walking in the hallway. She doesn't like hospital food, and has been eating soups and porridges that her daughter brings in, and has been drinking more fluids. She denies nausea/vomiting, constipation/diarrhea. She denies any pain in her leg today. She has no acute complaints.       MEDICATIONS  (STANDING):  chlorhexidine 2% Cloths 1 Application(s) Topical daily  haloperidol    Injectable 2.5 milliGRAM(s) IntraMuscular every 8 hours  heparin   Injectable 5000 Unit(s) SubCutaneous every 8 hours  hydrochlorothiazide 12.5 milliGRAM(s) Oral daily    MEDICATIONS  (PRN):  acetaminophen     Tablet .. 650 milliGRAM(s) Oral every 6 hours PRN Temp greater or equal to 38C (100.4F), Mild Pain (1 - 3)  haloperidol    Injectable 5 milliGRAM(s) IntraMuscular every 6 hours PRN Agitation      CAPILLARY BLOOD GLUCOSE        I&O's Summary      PHYSICAL EXAM:  Vital Signs Last 24 Hrs  T(C): 36.9 (03 Aug 2023 05:39), Max: 37.1 (02 Aug 2023 21:08)  T(F): 98.4 (03 Aug 2023 05:39), Max: 98.8 (02 Aug 2023 21:08)  HR: 96 (03 Aug 2023 05:39) (83 - 100)  BP: 139/67 (03 Aug 2023 05:39) (131/72 - 139/67)  BP(mean): --  RR: 17 (03 Aug 2023 05:39) (16 - 18)  SpO2: 95% (03 Aug 2023 05:39) (95% - 100%)    Parameters below as of 03 Aug 2023 05:39  Patient On (Oxygen Delivery Method): room air        GENERAL: No acute distress, well-appearing  CHEST/LUNG: CTAB; No wheezes, rales, or rhonchi  HEART: Regular rate and rhythm; Normal s1 and s2, No murmurs, rubs, or gallops  ABDOMEN: Soft, non-tender, non-distended  EXTREMITIES:  2+ peripheral pulses b/l, mild peripheral edema b/l   NEUROLOGY: A&O x 3, no focal deficits      LABS:                        10.9   8.61  )-----------( 329      ( 03 Aug 2023 06:45 )             34.0     08-03    144  |  107  |  10  ----------------------------<  121<H>  3.7   |  22  |  1.23    Ca    9.4      03 Aug 2023 06:45  Phos  3.2     08-03  Mg     1.80     08-03    TPro  7.0  /  Alb  3.9  /  TBili  0.2  /  DBili  x   /  AST  26  /  ALT  45<H>  /  AlkPhos  140<H>  08-03          Urinalysis Basic - ( 03 Aug 2023 06:45 )    Color: x / Appearance: x / SG: x / pH: x  Gluc: 121 mg/dL / Ketone: x  / Bili: x / Urobili: x   Blood: x / Protein: x / Nitrite: x   Leuk Esterase: x / RBC: x / WBC x   Sq Epi: x / Non Sq Epi: x / Bacteria: x

## 2023-08-03 NOTE — BH CONSULTATION LIAISON PROGRESS NOTE - NSBHMSEMOVE_PSY_A_CORE
Admission History and physical  [facility]    Patient: Kurt Mukherjee Todays Date: 1/9/2017   YOB: 1987 Date of Admission: 1/9/2017   MR Number: [id number] Attending Physician: Juan Riggs MD     Primary Care Provider:  No Pcp    Chief complaint: Abdominal pain    HPI:  Kurt Mukherjee is a 29 year old male with past medical history of anxiety, depression, PTSD who was admitted in [facility] on 01/07/2017 for alcohol-induced acute pancreatitis. Per notes, patient was already feeling better and was started on liquid diet. He then left AMA last night because he felt like his PTSD was not being managed well. He reports that this gets worse whenever he is in the hospital as he used to [employer] [facility] hospitals when he served in the  He presents to the emergency department again today complaining of abdominal pain and vomiting. Pain mainly over the epigastric area. He really has not eaten anything since going home last night. Last alcoholic drink was 4 days ago.  In the emergency department, lipase level noted to be at 979 which has improved from 4563 yesterday. Patient was afebrile but has elevated WBC count of 17.1. Patient was given a dose of Dilaudid.    Past Medical History   Diagnosis Date   • ADD (attention deficit disorder)    • Alcohol abuse    • Depression with anxiety 3/30/2015   • ASHLEY (generalized anxiety disorder) 3/30/2015   • Gastroesophageal reflux disease    • Panic attacks 3/30/2015   • Seizures      Past Surgical History   Procedure Laterality Date   • Pelvis/hip joint surgery unlisted  2008     left hip replacement       Prior to Admission Medications:    (Not in a hospital admission)    ALLERGIES:   Allergen Reactions   • Penicillins RASH       Social History:   reports that he has been smoking Cigarettes.  He has been smoking about 0.50 packs per day. He has never used smokeless tobacco. He reports that he drinks about 1.2 oz of alcohol per week  He reports that he 
does not use illicit drugs.    Family History:  family history is not on file.    ROS:  The remainder of 14 system review is negative other than stated above.    Physical Examination:  Vital 24 Hour Range Most Recent Value   Temperature Temp  Min: 98.7 °F (37.1 °C)  Max: 98.7 °F (37.1 °C) 98.7 °F (37.1 °C)   Pulse Pulse  Min: 83  Max: 97 83   Respiratory Resp  Min: 16  Max: 16 16   Blood Pressure BP  Min: 159/103  Max: 172/105 (!) 159/103   Pulse Oximetry SpO2  Min: 95 %  Max: 97 %    O2 No Data Recorded      Vital Most Recent Value First Value   Weight 74.8 kg Weight: 74.8 kg   Height           General - Patient is alert, oriented and in no acute distress.   HEENT - Normocephalic and atraumatic. Pupils equally round and reactive to light. Sclerae are anicteric. Moist mucous membranes.   Neck - Soft and supple, no JVD  Heart - Normal rate and rhythm without murmurs, rubs or gallops.   Lungs - Normal respiratory effort.  Lungs are clear to auscultation bilaterally without wheezes rubs or rhonchi.   Abd - Soft, non-tender and non-distended. Bowel sounds are normoactive. No guarding or rebound tenderness.   Ext  -  Warm without clubbing, cyanosis or edema.  Normal range of motion.  Skin - No rashes or lesions. Warm and dry.  No decubitus ulcers.  Neuro - Alert and oriented to person, place and time. No focal deficits.    Laboratory Results:  CMP    Recent Labs  Lab 01/09/17  1955 01/08/17  0847 01/08/17  0535 01/07/17  1210 01/07/17  0806   SODIUM 136 139  --  144 145   POTASSIUM 4.0 4.8  --  4.0 3.9   CHLORIDE 99 102  --  106 106   CO2 26 29  --  27 28   ANIONGAP 15 13  --  15 15   BUN 9* 6*  --  5* 5*   CREATININE 0.74 0.87 0.91 0.88 0.99   GLUCOSE 105* 104*  --  125* 116*   CALCIUM 9.0 8.9  --  8.8 9.2   ALBUMIN 3.1* 2.9*  --  3.4* 3.5*   AST 35 45*  --  102* 120*   GPT 59 78  --  125* 136*   ALKPT 111 80  --  90 100   BILIRUBIN 1.0 0.8  --  0.6 0.6   LIPA 979*  --  7413*  --  5694*     Invalid input(s): BLOOD 
ALCOHOL  CBC    Recent Labs  Lab 01/09/17  1955 01/08/17  0847 01/07/17  1210   WBC 17.1* 15.8* 16.7*   ANEUT 14.5* 13.9* 14.8*   RBC 4.97 5.05 4.95   HGB 16.0 16.0 15.6   HCT 45.5 45.7 45.1    158 185     CARDIAC  No results found  ENDO  No results found  LIPIDS  No results found    Imaging:  No results found.    Urine:  No results found    Microbiology:  No results found for this or any previous visit.  No results found for this or any previous visit.    EKG:  Results for orders placed or performed during the hospital encounter of 10/27/16   ECG   Result Value Ref Range    Systolic Blood Pressure 149     Diastolic Blood Pressure 94     Ventricular Rate EKG/Min (BPM) 90     Atrial Rate (BPM) 90     OK-Interval (MSEC) 166     QRS-Interval (MSEC) 96     QT-Interval (MSEC) 368     QTc 450     P Axis (Degrees) 58     R Axis (Degrees) 108     T Axis (Degrees) 43     REPORT TEXT       Normal sinus rhythm  Rightward axis  As previously noted  Incomplete right bundle branch block  Borderline ECG  When compared with ECG of  26-OCT-2016 02:29,  No significant change was found  Confirmed by REZA GARZA MD ([id number]) on 10/29/2016 3:23:38 PM         ASSESSMENT AND PLAN:    1. Acute alcoholic pancreatitis  -N.p.o., IV fluids, pain management    2. Leukocytosis  -Monitor clinically for signs of infection  -If with fever or persistent leukocytosis, consider repeat imaging of the abdomen.    3. History of alcohol abuse and dependence  -[facility] protocol  -Counseling done    4. History of generalized anxiety disorder and PTSD  -P.r.n. Lorazepam  -Currently does not want to see a psychiatrist here. He reports that he has been doing well with his PTSD. Hospitals make it worse but the lorazepam seems to help.    5. Elevated blood pressure  -Could be from pain  -Hydralazine p.r.n.    6. Nicotine dependence  -Nicotine patch ordered  -Counseling done    DVT Prophylaxis: Subcutaneous Lovenox  Code Status: Full 
resuscitation    Anticipate at least 2 nights of hospital stay for treatment.     Juan Riggs MD  [facility]  [phone]    
No abnormal movements

## 2023-08-03 NOTE — CHART NOTE - NSCHARTNOTEFT_GEN_A_CORE
SNa improved with HCTZ, BP acceptable. Nephrology signing off. Please call with any further questions.

## 2023-08-03 NOTE — BH CONSULTATION LIAISON PROGRESS NOTE - NSBHASSESSMENTFT_PSY_ALL_CORE
Patient is a 58F w Bipolar 1 who is here for urgent HD for Lithium toxicity a/w ALICJA/ATN c/b possible UTI. Patient is  but in a relationship, domiciled w/  daughter, her boyfriend, employed as a transport dispatch at [employer], w/ PPHx of bipolar d/o followed in [facility], w/ 4 prior psychiatric hospitalizations most recent one in 2016 at [facility] for "psychotic break" as per daughter, no known suicide attempt or violence hx, no known legal hx, no known substance abuse hx.   Spoke with daughter at bedside. Patient is sedated in ICU.  As per daughter, patient has a dx of bipolar disorder. she has a hx of inpatient admissions but has been doing well since 2016. Patient has been following outpatient with BLAYNE Castorena and compliant with medications.  Patient was recently on haldol and tapered off ( not for s/e , rather not longer psychotic). Has a previous bad s/e from a different antipsychotic but daughter cannot recall name. Daughter states patient has been stressed lately with her other daughter and having the responsibility of taking care of her grandchild, and still working. States patient is independent at baseline.     7/29: Patient still delirious. Requiring PRNs for agitation. Unable to fully assess orientation. Continue 1:1.  7/31: delirium improving, more awake, mild dysmetria to bounds but no tremors, c/w 1:1  8/1: remains confused, requiring less PRN medications, no SI or HI  8/2: confusion improving, no SI/HI, no EPS noted, c/w meds as below, tolerating cessation of ativan   8/3: a&o, states feels much better, c/o restlessness, amenable to med management, denies si/hi, denies ah/vh    PLAN  - defer level of observation to primary team - no SI or HI reported - patient remains confused and restless- ES?  - EKG - antipsychotics can only be given if qtc < 500   - agree with primary and tox - HD completed - lithium level 0.8  - continue to HOLD home lithium  - c/w standing Haldol 5mg TID for ongoing agitation and restlessness /delirium - can make PO if patient now tolerating PO medications  -START Cogentin 0.5mg bid  - STOP standing ativan  - PRN haldol 5mg q6hrs for breakthrough agitation  -Monitor EKG qtc interval  - Dispo: no psychiatric contraindications to discharge, d/w dtr to contact outpatient provider for appointment, please send patient home with prescriptions for Haldol 5mg tid and Cogentin 0.5mg bid

## 2023-08-03 NOTE — PROGRESS NOTE ADULT - PROBLEM SELECTOR PLAN 4
Patient lost two bottom teeth 7/28 due to biting bed railing in episode of agitation.   -Dental saw the patient today, but said they were unable to obtain consent for an x-ray; they will follow-up with patient's daughter   -XR did not show aspiration or swallowing of teeth

## 2023-08-03 NOTE — BH CONSULTATION LIAISON PROGRESS NOTE - NSBHMSEMOOD_PSY_A_CORE
Unable to assess
Normal/Unable to assess
Normal/Unable to assess
Unable to assess
Normal/Unable to assess

## 2023-08-03 NOTE — BH CONSULTATION LIAISON PROGRESS NOTE - NSBHFUPINTERVALHXFT_PSY_A_CORE
Required Haldol x 2 since last evaluation.   Patient seen in bed. Opens her eyes to commands. Is unable to answer most questions, starts to answer by mumbling unintelligibly. 
patient much more awake, sitting in chair, denies SI/HI but admits she felt manic prior to admission and does not recall if she took extra pills or not, apperas confused going off on tangents at times, Aox2
patient was seen and assessed at bedside. Patient restless, confused, agitated , requiring precedex and PRN medication. Called for assistance removing precedex and medication management.  patient is mumbling, moving about in bed, tried to bite. Loose no rigidity.
patient feels "better" still feels "foggy" and is having some word findings difficulties, speech less dysarthric than before, movements more fluid, no EPS signs, AOx3, mood "good", denies SI/HI
Chart reviewed. Medication compliant, no prn's given, patient a&o, calm, cooperative, states she feels much better, endorses feeling restless, c/o having to get up frequently, discussed medication management for symptom, no cogwheeling, stiffness or rigidity upon physical examination, denies any dystonic reactions, denies suicidal/homicidal ideation, also discussed importance of follow up with [facility] outpatient provider, verbalized understanding.     Discussed with patient's daughter, Rozina privately with patient's permission, daughter states patient's gait is much improved, expressed concern regarding recent restlessness, in agreement for staring Cogentin, also discussed contacting patient's [facility] outpatient provider for follow up appointment, dtr reports she left message.    Writer to email outpatient provider with update.
Patient was seen and assessed at bedside. NOw on floors, transferred from ICU.  Patient is alert, oriented to name, place and date however still remains confused on events leading to her hospitalization.  Again asked why her lithium level was so high and patient remains unsure - denies this was an attempt at self harm. Daughter at bedside also agrees mother was not appearing to be suicidal prior to admission, but does note she was stressed and maybe manic?  Daughter also notes patient states she does not like the way ativan makes her feel "jumpy" and is asking to stop medication.   No SI or HI on todays exam  REmains confused with poor eye contact and at times appears restless, shifting body often during exam.

## 2023-08-03 NOTE — DISCHARGE NOTE PROVIDER - PROVIDER TOKENS
PROVIDER:[TOKEN:[[id number]],FOLLOWUP:[1 week],ESTABLISHEDPATIENT:[T]] PROVIDER:[TOKEN:[[id number]],FOLLOWUP:[1 week],ESTABLISHEDPATIENT:[T]],FREE:[LAST:[Your Primary Care Doctor],PHONE:[(   )    -],FAX:[(   )    -],FOLLOWUP:[1 week],ESTABLISHEDPATIENT:[T]]

## 2023-08-03 NOTE — PROGRESS NOTE ADULT - PROBLEM SELECTOR PLAN 2
Lithium on hold due to lithium toxicity.   -Psych C/L following   -Haldol 5mg TID for ongoing agitation and restlessness/delirium   -PRN haldol 5mg q6h for breakthrough agitation   -QTc monitoring, hold haldol QTC<500  -C/L considering possible inpatient admission vs outpatient follow-up depending on medication response  -D/c standing ativan as per psych recs Lithium on hold due to lithium toxicity.   -Psych C/L following   -Haldol 5mg TID for ongoing agitation and restlessness/delirium; changed to PO    -PRN haldol 5mg q6h for breakthrough agitation   -QTc monitoring, hold haldol QTC<500  -C/L considering possible inpatient admission vs outpatient follow-up depending on medication response  -D/c standing ativan as per psych recs

## 2023-08-03 NOTE — DISCHARGE NOTE PROVIDER - HOSPITAL COURSE
Dede Flores is a 58-year-old female with a past medical history of bipolar on lithium who presented emergency department on 7/25 for 1 week of lethargy and fatigue. The patient has not been responsive or talking to her since earlier in the day. The patient has not taken her medications since two days prior to admission due to nausea and vomiting.  Patient's daughter stated she had been nauseous and having multiple episodes of diarrhea for the last week.  Patient has not had a PO intake in over 24 hours. Blood work at ED significant for Lithium level 5.3 (0.6-1.2 normal), Cr. 3.81, WBC 14, blood glucose 168 UA showing leukocytes, LE, Nitrites negative, Bacteria, Ketones, Calcium Oxalate Crystals. She was admitted to MICU for urgent hemodialysis.    MICU course: The patient underwent urgent HD given persistently elevated lithium levels on 7/26 and on 7/27. Lithium levels have remained <0.6 since 7/29. 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 Course also complicated by UTI; completed 3-day course of ceftriaxone. On 7/28, patient was agitated and bit the bed railing, resulting in the loss of two bottom teeth; no tooth aspirated or in stomach on XR. Patient was on precedex for agitation, d/c 7/31. Agitation managed with haldol 5mg TID and ativan 0.5mg BID standing, and haldol 5mg PRN.     Floors: Patient continued to be managed with haldol 5mg TID; ativan d/c 8/1. Patient continued to show improvement in mental status. Nephrology started patient on HCTZ 12.5 qd on 8/2, which improved serum sodium from 147 to 143. Patient had weakness, which required the use of a walker to get around.

## 2023-08-03 NOTE — BH CONSULTATION LIAISON PROGRESS NOTE - NSBHFUPINTERVALCCFT_PSY_A_CORE
Patient states, "I feel restless."
I'm doing a bit better"
mumbled, restless
patient remains confused  required less PRN
mumbling
I feel better"

## 2023-08-03 NOTE — BH CONSULTATION LIAISON PROGRESS NOTE - NSBHCHARTREVIEWVS_PSY_A_CORE FT
Vital Signs Last 24 Hrs  T(C): 35.7 (28 Jul 2023 12:00), Max: 36.7 (27 Jul 2023 20:00)  T(F): 96.3 (28 Jul 2023 12:00), Max: 98 (27 Jul 2023 20:00)  HR: 72 (28 Jul 2023 13:00) (51 - 83)  BP: 149/65 (28 Jul 2023 13:00) (102/70 - 170/70)  BP(mean): 86 (28 Jul 2023 13:00) (58 - 108)  RR: 17 (28 Jul 2023 13:00) (11 - 25)  SpO2: 98% (28 Jul 2023 13:00) (92% - 100%)    Parameters below as of 28 Jul 2023 12:00  Patient On (Oxygen Delivery Method): room air    
Vital Signs Last 24 Hrs  T(C): 36.5 (03 Aug 2023 12:45), Max: 37.1 (02 Aug 2023 21:08)  T(F): 97.7 (03 Aug 2023 12:45), Max: 98.8 (02 Aug 2023 21:08)  HR: 98 (03 Aug 2023 12:45) (83 - 98)  BP: 156/70 (03 Aug 2023 12:45) (131/72 - 156/70)  BP(mean): --  RR: 17 (03 Aug 2023 12:45) (16 - 18)  SpO2: 100% (03 Aug 2023 12:45) (95% - 100%)    Parameters below as of 03 Aug 2023 12:45  Patient On (Oxygen Delivery Method): room air    
Vital Signs Last 24 Hrs  T(C): 36.7 (02 Aug 2023 05:20), Max: 36.9 (01 Aug 2023 14:54)  T(F): 98 (02 Aug 2023 05:20), Max: 98.5 (01 Aug 2023 14:54)  HR: 101 (02 Aug 2023 05:20) (90 - 101)  BP: 134/71 (02 Aug 2023 05:20) (127/72 - 148/74)  BP(mean): --  RR: 17 (02 Aug 2023 05:20) (17 - 18)  SpO2: 100% (02 Aug 2023 05:20) (100% - 100%)    Parameters below as of 02 Aug 2023 05:20  Patient On (Oxygen Delivery Method): room air    
Vital Signs Last 24 Hrs  T(C): 36.5 (01 Aug 2023 10:30), Max: 37.2 (01 Aug 2023 07:00)  T(F): 97.7 (01 Aug 2023 10:30), Max: 99 (01 Aug 2023 07:00)  HR: 94 (01 Aug 2023 10:30) (77 - 110)  BP: 137/82 (01 Aug 2023 10:30) (116/75 - 163/99)  BP(mean): 91 (31 Jul 2023 21:00) (82 - 120)  RR: 18 (01 Aug 2023 10:30) (12 - 21)  SpO2: 100% (01 Aug 2023 10:30) (96% - 100%)    Parameters below as of 01 Aug 2023 10:30  Patient On (Oxygen Delivery Method): room air    
Vital Signs Last 24 Hrs  T(C): 36.6 (31 Jul 2023 08:00), Max: 37.2 (31 Jul 2023 00:25)  T(F): 97.9 (31 Jul 2023 08:00), Max: 98.9 (31 Jul 2023 00:25)  HR: 95 (31 Jul 2023 12:17) (54 - 95)  BP: 119/75 (31 Jul 2023 12:17) (101/63 - 161/70)  BP(mean): 81 (31 Jul 2023 12:17) (67 - 92)  RR: 21 (31 Jul 2023 12:17) (12 - 25)  SpO2: 99% (31 Jul 2023 12:17) (97% - 100%)    Parameters below as of 31 Jul 2023 08:00  Patient On (Oxygen Delivery Method): room air    
Vital Signs Last 24 Hrs  T(C): 36 (29 Jul 2023 08:00), Max: 36.7 (29 Jul 2023 00:00)  T(F): 96.8 (29 Jul 2023 08:00), Max: 98 (29 Jul 2023 00:00)  HR: 54 (29 Jul 2023 11:00) (46 - 109)  BP: 114/55 (29 Jul 2023 11:00) (92/53 - 181/82)  BP(mean): 69 (29 Jul 2023 11:00) (63 - 108)  RR: 15 (29 Jul 2023 11:00) (13 - 28)  SpO2: 98% (29 Jul 2023 11:00) (93% - 100%)    Parameters below as of 29 Jul 2023 11:00  Patient On (Oxygen Delivery Method): room air

## 2023-08-04 VITALS
DIASTOLIC BLOOD PRESSURE: 90 MMHG | OXYGEN SATURATION: 100 % | TEMPERATURE: 98 F | HEART RATE: 86 BPM | RESPIRATION RATE: 18 BRPM | SYSTOLIC BLOOD PRESSURE: 134 MMHG

## 2023-08-04 LAB
ALBUMIN SERPL ELPH-MCNC: 4 G/DL — SIGNIFICANT CHANGE UP (ref 3.3–5)
ALP SERPL-CCNC: 135 U/L — HIGH (ref 40–120)
ALT FLD-CCNC: 40 U/L — HIGH (ref 4–33)
ANION GAP SERPL CALC-SCNC: 13 MMOL/L — SIGNIFICANT CHANGE UP (ref 7–14)
AST SERPL-CCNC: 24 U/L — SIGNIFICANT CHANGE UP (ref 4–32)
BILIRUB SERPL-MCNC: 0.3 MG/DL — SIGNIFICANT CHANGE UP (ref 0.2–1.2)
BUN SERPL-MCNC: 9 MG/DL — SIGNIFICANT CHANGE UP (ref 7–23)
CALCIUM SERPL-MCNC: 9.2 MG/DL — SIGNIFICANT CHANGE UP (ref 8.4–10.5)
CHLORIDE SERPL-SCNC: 101 MMOL/L — SIGNIFICANT CHANGE UP (ref 98–107)
CO2 SERPL-SCNC: 23 MMOL/L — SIGNIFICANT CHANGE UP (ref 22–31)
CREAT SERPL-MCNC: 1.26 MG/DL — SIGNIFICANT CHANGE UP (ref 0.5–1.3)
EGFR: 49 ML/MIN/1.73M2 — LOW
GLUCOSE SERPL-MCNC: 132 MG/DL — HIGH (ref 70–99)
HCT VFR BLD CALC: 32.6 % — LOW (ref 34.5–45)
HGB BLD-MCNC: 10.6 G/DL — LOW (ref 11.5–15.5)
MAGNESIUM SERPL-MCNC: 1.7 MG/DL — SIGNIFICANT CHANGE UP (ref 1.6–2.6)
MCHC RBC-ENTMCNC: 31 PG — SIGNIFICANT CHANGE UP (ref 27–34)
MCHC RBC-ENTMCNC: 32.5 GM/DL — SIGNIFICANT CHANGE UP (ref 32–36)
MCV RBC AUTO: 95.3 FL — SIGNIFICANT CHANGE UP (ref 80–100)
NRBC # BLD: 0 /100 WBCS — SIGNIFICANT CHANGE UP (ref 0–0)
NRBC # FLD: 0 K/UL — SIGNIFICANT CHANGE UP (ref 0–0)
PHOSPHATE SERPL-MCNC: 3.8 MG/DL — SIGNIFICANT CHANGE UP (ref 2.5–4.5)
PLATELET # BLD AUTO: 363 K/UL — SIGNIFICANT CHANGE UP (ref 150–400)
POTASSIUM SERPL-MCNC: 3.3 MMOL/L — LOW (ref 3.5–5.3)
POTASSIUM SERPL-SCNC: 3.3 MMOL/L — LOW (ref 3.5–5.3)
PROT SERPL-MCNC: 7.1 G/DL — SIGNIFICANT CHANGE UP (ref 6–8.3)
RBC # BLD: 3.42 M/UL — LOW (ref 3.8–5.2)
RBC # FLD: 14.7 % — HIGH (ref 10.3–14.5)
SODIUM SERPL-SCNC: 137 MMOL/L — SIGNIFICANT CHANGE UP (ref 135–145)
WBC # BLD: 8.62 K/UL — SIGNIFICANT CHANGE UP (ref 3.8–10.5)
WBC # FLD AUTO: 8.62 K/UL — SIGNIFICANT CHANGE UP (ref 3.8–10.5)

## 2023-08-04 PROCEDURE — 99239 HOSP IP/OBS DSCHRG MGMT >30: CPT | Mod: GC

## 2023-08-04 PROCEDURE — 71045 X-RAY EXAM CHEST 1 VIEW: CPT | Mod: 26

## 2023-08-04 RX ORDER — HALOPERIDOL DECANOATE 100 MG/ML
1 INJECTION INTRAMUSCULAR
Qty: 42 | Refills: 0
Start: 2023-08-04 | End: 2023-08-17

## 2023-08-04 RX ORDER — BENZTROPINE MESYLATE 1 MG
1 TABLET ORAL
Qty: 28 | Refills: 0
Start: 2023-08-04 | End: 2023-08-17

## 2023-08-04 RX ORDER — HYDROCHLOROTHIAZIDE 25 MG
1 TABLET ORAL
Qty: 14 | Refills: 0
Start: 2023-08-04 | End: 2023-08-17

## 2023-08-04 RX ORDER — BENZTROPINE MESYLATE 1 MG
1 TABLET ORAL
Qty: 60 | Refills: 0
Start: 2023-08-04 | End: 2023-09-02

## 2023-08-04 RX ORDER — LITHIUM CARBONATE 300 MG/1
0 TABLET, EXTENDED RELEASE ORAL
Qty: 0 | Refills: 0 | DISCHARGE

## 2023-08-04 RX ORDER — POTASSIUM CHLORIDE 20 MEQ
40 PACKET (EA) ORAL ONCE
Refills: 0 | Status: COMPLETED | OUTPATIENT
Start: 2023-08-04 | End: 2023-08-04

## 2023-08-04 RX ADMIN — CHLORHEXIDINE GLUCONATE 1 APPLICATION(S): 213 SOLUTION TOPICAL at 14:46

## 2023-08-04 RX ADMIN — HALOPERIDOL DECANOATE 5 MILLIGRAM(S): 100 INJECTION INTRAMUSCULAR at 05:29

## 2023-08-04 RX ADMIN — HALOPERIDOL DECANOATE 5 MILLIGRAM(S): 100 INJECTION INTRAMUSCULAR at 14:45

## 2023-08-04 RX ADMIN — HEPARIN SODIUM 5000 UNIT(S): 5000 INJECTION INTRAVENOUS; SUBCUTANEOUS at 05:29

## 2023-08-04 RX ADMIN — HEPARIN SODIUM 5000 UNIT(S): 5000 INJECTION INTRAVENOUS; SUBCUTANEOUS at 14:45

## 2023-08-04 RX ADMIN — Medication 0.5 MILLIGRAM(S): at 08:43

## 2023-08-04 NOTE — PROGRESS NOTE ADULT - ATTENDING SUPERVISION STATEMENT
Resident
Fellow
Fellow
Resident

## 2023-08-04 NOTE — PROGRESS NOTE ADULT - PROBLEM SELECTOR PROBLEM 2
Lithium toxicity
Bipolar 1 disorder
Lithium toxicity
Bipolar 1 disorder
Diabetes insipidus
Bipolar 1 disorder
Bipolar 1 disorder

## 2023-08-04 NOTE — CHART NOTE - NSCHARTNOTEFT_GEN_A_CORE
Source: Patient [ ]    Family [ ]     other [ x] PCA, chart review    Patient seen for nutrition f/u. 58 year old female with a past psychiatric history of bipolar 1 admitted initially to the MICU for urgent hemodialysis in the setting of lithium toxicity per chart.    Patient out of room for test. Recent swallow eval (8/1) w/ rec for soft and bite-sized/thin liquids. PCA reports patient didn't consume much of meal because of consistency. Intakes are 0-50% per RN flow sheet. No GI distress. Noted w/ +1 L/R foot + ankle edema and no pressure injuries per RN flow sheet.    Diet : soft and bite-sized    Current Weight: No new weight to assess  79.5 kg (7/29)  78 kg (7/27) * post HD  77.3 kg (7/26) *post HD    Pertinent Medications: MEDICATIONS  (STANDING):  benztropine 0.5 milliGRAM(s) Oral every 12 hours  chlorhexidine 2% Cloths 1 Application(s) Topical daily  haloperidol     Tablet 5 milliGRAM(s) Oral every 8 hours  heparin   Injectable 5000 Unit(s) SubCutaneous every 8 hours  hydrochlorothiazide 12.5 milliGRAM(s) Oral daily  potassium chloride    Tablet ER 40 milliEquivalent(s) Oral once    MEDICATIONS  (PRN):  acetaminophen     Tablet .. 650 milliGRAM(s) Oral every 6 hours PRN Temp greater or equal to 38C (100.4F), Mild Pain (1 - 3)  haloperidol    Injectable 5 milliGRAM(s) IntraMuscular every 6 hours PRN Agitation    Pertinent Labs:  08-04 Na137 mmol/L Glu 132 mg/dL<H> K+ 3.3 mmol/L<L> Cr  1.26 mg/dL BUN 9 mg/dL 08-04 Phos 3.8 mg/dL 08-04 Alb 4.0 g/dL    Estimated Needs:   [x ] no change since previous assessment  [ ] recalculated:     Previous Nutrition Diagnosis: Inadequate protein energy intake    Nutrition Diagnosis is [x ] ongoing  [ ] resolved [ ] not applicable     Education:    [  ] Given today    Type of education provided:    [  ] Given on previous assessment by RD    [  ] Not applicable 2/2 cognitive deficit    [  ] Pt refusal of education offered    [  ] Not applicable 2/2 current prognosis    [x  ] Not warranted at present    Recommend  - continue diet as ordered  - nutrition department will provide [employer] vital shake 1x daily (520 kcal, 22 g pro) and magic cup 1x daily (290 kcal, 9 g pro)  - obtain weekly weight and document PO intake to monitor trend    Monitoring and Evaluation:     [x ] PO intake [x ] Tolerance to diet prescription [x ] weights [ x] follow up per protocol    Tutu Stephens, [id number] or TEAMS

## 2023-08-04 NOTE — PROGRESS NOTE ADULT - PROBLEM SELECTOR PLAN 5
DVT prophylaxis: heparin 5000units subq q8h

## 2023-08-04 NOTE — PROGRESS NOTE ADULT - PROBLEM SELECTOR PLAN 2
Lithium on hold due to lithium toxicity.   -Psych C/L following --recommend d/c to follow up with outpt psych provider  -Haldol 5mg TID for ongoing agitation and restlessness/delirium; changed to PO    -PRN haldol 5mg q6h for breakthrough agitation   - initiate cogentin 0.5 BID  -QTc monitoring, hold haldol QTC<500  -D/c on standing haldol and cogentin per psych

## 2023-08-04 NOTE — PROGRESS NOTE ADULT - SUBJECTIVE AND OBJECTIVE BOX
Progress Note    BERTA FLORIAN 58y (1965) Female [id number]  07-26-23 (9d)    Dr. Adalid Williamson, EM/IM PGY4  Pager# [phone]    Chief Complaint: AMS    Subjective:  No acute events overnight. Patient seen and examined at bedside. Ambulating in hallway with walker, no acute complaints.     PAST MEDICAL & SURGICAL HISTORY:  Bipolar 1 disorder [F31.9]    No significant past surgical history [[id number]]      acetaminophen     Tablet .. 650 milliGRAM(s) Oral every 6 hours PRN  benztropine 0.5 milliGRAM(s) Oral every 12 hours  chlorhexidine 2% Cloths 1 Application(s) Topical daily  haloperidol     Tablet 5 milliGRAM(s) Oral every 8 hours  haloperidol    Injectable 5 milliGRAM(s) IntraMuscular every 6 hours PRN  heparin   Injectable 5000 Unit(s) SubCutaneous every 8 hours  hydrochlorothiazide 12.5 milliGRAM(s) Oral daily    Objective:  T(C): 37.4 (08-04-23 @ 05:01), Max: 37.4 (08-04-23 @ 05:01)  HR: 100 (08-04-23 @ 05:01) (97 - 100)  BP: 117/74 (08-04-23 @ 05:01) (117/74 - 156/70)  RR: 18 (08-04-23 @ 05:01) (17 - 18)  SpO2: 100% (08-04-23 @ 05:01) (100% - 100%)    Physical exam:  GENERAL: No acute distress, well-appearing  CHEST/LUNG: CTAB; No wheezes, rales, or rhonchi  HEART: Regular rate and rhythm; Normal s1 and s2, No murmurs, rubs, or gallops  ABDOMEN: Soft, non-tender, non-distended  EXTREMITIES:  2+ peripheral pulses b/l, mild peripheral edema b/l   NEUROLOGY: A&O x 3, no focal deficits      08-03-23 @ 07:01  -  08-04-23 @ 07:00  --------------------------------------------------------  IN: 0 mL / OUT: 300 mL / NET: -300 mL        CAPILLARY BLOOD GLUCOSE      (08-03 @ 06:45)                      10.9  8.61 )-----------( 329                 34.0    Neutrophils = -- (--%)  Lymphocytes = -- (--%)  Eosinophils = -- (--%)  Basophils = -- (--%)  Monocytes = -- (--%)  Bands = --%    08-03    144  |  107  |  10  ----------------------------<  121<H>  3.7   |  22  |  1.23    Ca    9.4      03 Aug 2023 06:45  Phos  3.2     08-03  Mg     1.80     08-03    TPro  7.0  /  Alb  3.9  /  TBili  0.2  /  DBili  x   /  AST  26  /  ALT  45<H>  /  AlkPhos  140<H>  08-03      Urinalysis Basic - ( 03 Aug 2023 06:45 )    Color: x / Appearance: x / SG: x / pH: x  Gluc: 121 mg/dL / Ketone: x  / Bili: x / Urobili: x   Blood: x / Protein: x / Nitrite: x   Leuk Esterase: x / RBC: x / WBC x   Sq Epi: x / Non Sq Epi: x / Bacteria: x        WBC Trend: 8.61<--, 10.34<--, 9.04<--    Hb Trend: 10.9<--, 10.9<--, 11.0<--, 10.8<--, 10.2<--        New imaging in last 24 hours:  Consult notes reviewed:

## 2023-08-04 NOTE — PROGRESS NOTE ADULT - PROBLEM SELECTOR PROBLEM 1
ALICJA (acute kidney injury)
Lithium toxicity
Lithium toxicity
ALICJA (acute kidney injury)
Lithium toxicity
ALICJA (acute kidney injury)
Lithium toxicity
Lithium toxicity

## 2023-08-04 NOTE — PROGRESS NOTE ADULT - PROBLEM SELECTOR PLAN 1
The patient was admitted with a lithium level of 5.3 and altered mental status (A&Ox1). The patient had 2 sessions of hemodialysis and her lithium level <0.6 since 7/29.  - Lithium on hold  - No further need for hemodialysis
Resolved.  Monitor BMP
Pt. with ALICJA in the setting of elevated lithium level, sepsis, nausea, vomiting, diarrhea, and decreased PO intake. On review of [place], Scr was WNL at 1.02 on 5/26/23. Scr was elevated to 3.81 on ER labs performed on 7/24. Pt. received IVF. Scr remained elevated at 3.60. UOP documented as 1.4L in last 24 hours. UA shows proteinuria, evidence of infection, ?178 casts, 8 RBCs, and 22 epithelial cells. Renal US (7/26) w/ no evidence of obstruction or hydronephrosis. Toxicology consult note was reviewed. Pt. with AMS. Labs reviewed. Pt. with lithium induced ALICJA vs pre-renal ALICJA vs ATN. Pt. underwent urgent HD given persistently elevated lithium levels on 7/26. Continue with IVFs. Monitor labs and urine output. Avoid nephrotoxins. Dose medications as per eGFR.
The patient was admitted with a lithium level of 5.3 and altered mental status (A&Ox1). The patient had 2 sessions of hemodialysis and her lithium level <0.6 since 7/29.  - Lithium on hold  - No further need for hemodialysis
Pt. with ALICJA in the setting of elevated lithium level, sepsis, nausea, vomiting, diarrhea, and decreased PO intake. On review of [place], Scr was WNL at 1.02 on 5/26/23. Scr was elevated to 3.81 on ER labs performed on 7/24. Pt. received IVF. Scr remained elevated at 3.60. UOP documented as 3.7L in last 24 hours. UA shows proteinuria, evidence of infection, ?178 casts, 8 RBCs, and 22 epithelial cells. Renal US (7/26) w/ no evidence of obstruction or hydronephrosis. Toxicology consult note was reviewed. Pt. with AMS. Labs reviewed. Pt. with lithium induced ALICJA vs pre-renal ALICJA vs ATN. Pt. underwent urgent HD given persistently elevated lithium levels on 7/26 and on 7/27. Continue with IVFs. Monitor labs and urine output. Avoid nephrotoxins. Dose medications as per eGFR.

## 2023-08-04 NOTE — PROGRESS NOTE ADULT - ATTENDING COMMENTS
Pt with slow speech but answering questions appropriately.  Daughter is at bedside.  She states the ativan makes her feel funny but feels haldol is working for her.  She denies SI at this time.  would check ekg for qtc.  follow up with psych regarding medication changes and final dispo.
58 F with bipolar d/o on lithium here with AMS/gi upset, found to have acute metabolic encephalopathy due to lithium toxicity and ALICJA    #AMS  #Lithium toxicity  #Nephrogenic DI    -s/p HD for lithium toxicity. No further need for HD  -S/p antibiotic coverage for UTI   - Patient doing better on thiazide. Continue (either IV or PO) for nephrogenic DI   -Continue Haldol to 5mg TID with prn.   - Please add Ativan 0.5 mg BID  - Attempt to decrease off precedex, then will add benzo.
58 F with bipolar d/o on lithium here with AMS/gi upset, found to have acute metabolic encephalopathy due to lithium toxicity and ALICJA    s/p HD this AM, tolerated well  lithium level still elevated to 3.2  will d/w renal/tox re: next session of HD  concern for sepsis due to uti, will cover empirically and check cultures  ALICJA likely related to lithium toxicity vs infection, will monitor    lvsf normal  hsq  full code   Critically ill patient requiring frequent bedside visits with therapy changes.
58 F with bipolar d/o on lithium here with AMS/gi upset, found to have acute metabolic encephalopathy due to lithium toxicity and ALICJA    s/p HD yesterday  level afterwards less than 1  will clarify with tox and renal re: need for further levels/HD and then remove shiley if possible  concern for sepsis due to uti, cover empirically   ALICJA likely related to lithium toxicity vs infection, will monitor, hypernatremia will do d5w  per outpatient psychiatrist, patient has been appearing more hypomanic lately, will wean precedex as tolerated for acute metabolic encephalopathy and f/u psych reccs today (spoke with psych today, will see patient)
58 F with bipolar d/o on lithium here with AMS/gi upset, found to have acute metabolic encephalopathy due to lithium toxicity and ALICJA    Patient admitted to MICU for urgent HD, s/p HD x2 with resolution of lithium toxicity. However, with large volume urine output. Was previously on D5.     #AMS  #Lithium toxicity  #Nephrogenic DI  # UTI  # Broken tooth  -s/p HD for lithium toxicity. No further need for HD  -S/p antibiotic coverage for UTI   -Continue Haldol and ativan for agitation  - Will need outpatient dental, CXR without aspirated tooth.  - Weaned off precedex.   - patient with high volume urine output since Friday, Was on D5W until changed to NS last night, unclear why. Low urine osm at that time. Was making 5L daily friday. Per nephrology can try off of IVF. Monitor Na.   - DVT ppx - SQH  - Dispo- full code
58 F with bipolar d/o on lithium here with AMS/gi upset, found to have acute metabolic encephalopathy due to lithium toxicity and ALICJA    s/p HD yesterday  level afterwards less than 1  No further need for HD  concern for sepsis due to uti, cover empirically   Patient with continued increase in urine output and may be due to nephrogenic DI given lithium. Still hypernatremic. For now, will give HCTZ if patient   per outpatient psychiatrist, patient has been appearing more hypomanic lately, will wean precedex as tolerated for acute metabolic encephalopathy Please increase haldol to 5mg TID with prn. If this is not able to decrease off precedex, then will add benzo.
58 F with bipolar d/o on lithium here with AMS/gi upset, found to have acute metabolic encephalopathy due to lithium toxicity and ALICJA    s/p HD again this AM  level afterwards 1  will clarify with tox and renal re: need for further levels/HD and then remove shiley if possible  concern for sepsis due to uti, cover empirically and check cultures  ALICJA likely related to lithium toxicity vs infection, will monitor  per outpatient psychiatrist, patient has been appearing more hypomanic lately, will wean precedex as tolerated and f/u psych reccs
ALICJA  Lithium toxicity  AMS    Pt with AMS in the setting of high supratherapeutic lithium levels, again increasing today on repeat  Will plan for another dialysis today   Will cont to monitor labs and Is/Os closely
ALICJA  Lithium toxicity  AMS    s/p HD yesterday  Monitor levels    Will sign off, please call with questions
Pt mentation improved to baseline.  appreciate psych input - pt to follow as outpt.  discharge planning and coordination ~ 45mins.
Pt's mentation and speech have been clearer today compared to yesterday.  continue with current psych meds as per psychiatry.  EKG reviewed from yesterday.  Awaiting final dispo re: inpt psych hospitalization.  Mild hypernatremia noted - encouraged pt to hydrate adequately, pt admits to having limited po intake last few days.
Pt's mentation has significantly improved.  Daughter at bedside - she states there is always someone present in the home with the pt.  Pt ambulated with PT and recommendation is for home with walker, no further PT needs.  Will follow up with psych regarding dispo recs.

## 2023-08-04 NOTE — PROGRESS NOTE ADULT - PROVIDER SPECIALTY LIST ADULT
MICU
Nephrology
Dental
Internal Medicine
MICU
Toxicology
Nephrology
Internal Medicine
Nephrology
Internal Medicine
Internal Medicine

## 2023-08-07 NOTE — CHART NOTE - NSCHARTNOTEFT_GEN_A_CORE
Post-Discharge Medication Review    Caregiver (Rozina, daughter) was contacted to offer medication counseling post-discharge. Caregiver declined counseling.

## 2023-08-07 NOTE — CHART NOTE - NSCHARTNOTESELECT_GEN_ALL_CORE
N/Off Service Note
Event Note
MAR accept
MICU Downgrade/Transfer Note
MICU-POCUS/Event Note
Nutrition Services
Post-Discharge Note
Telephone

## 2023-09-01 ENCOUNTER — INPATIENT (INPATIENT)
Facility: HOSPITAL | Age: 58
LOS: 5 days | Discharge: HOME | End: 2023-09-07
Attending: PSYCHIATRY & NEUROLOGY | Admitting: PSYCHIATRY & NEUROLOGY
Payer: MEDICAID

## 2023-09-01 VITALS — TEMPERATURE: 99 F | OXYGEN SATURATION: 100 % | HEIGHT: 67.5 IN | WEIGHT: 182.98 LBS

## 2023-09-01 DIAGNOSIS — F33.9 MAJOR DEPRESSIVE DISORDER, RECURRENT, UNSPECIFIED: ICD-10-CM

## 2023-09-01 PROCEDURE — 99222 1ST HOSP IP/OBS MODERATE 55: CPT

## 2023-09-01 RX ORDER — OLANZAPINE 15 MG/1
10 TABLET, FILM COATED ORAL AT BEDTIME
Refills: 0 | Status: COMPLETED | OUTPATIENT
Start: 2023-09-01 | End: 2023-09-01

## 2023-09-01 RX ORDER — HALOPERIDOL DECANOATE 100 MG/ML
5 INJECTION INTRAMUSCULAR ONCE
Refills: 0 | Status: DISCONTINUED | OUTPATIENT
Start: 2023-09-01 | End: 2023-09-07

## 2023-09-01 RX ORDER — NICOTINE POLACRILEX 2 MG
2 GUM BUCCAL
Refills: 0 | Status: DISCONTINUED | OUTPATIENT
Start: 2023-09-01 | End: 2023-09-07

## 2023-09-01 RX ORDER — DIVALPROEX SODIUM 500 MG/1
500 TABLET, DELAYED RELEASE ORAL AT BEDTIME
Refills: 0 | Status: COMPLETED | OUTPATIENT
Start: 2023-09-01 | End: 2023-09-01

## 2023-09-01 RX ORDER — DIPHENHYDRAMINE HCL 50 MG
50 CAPSULE ORAL ONCE
Refills: 0 | Status: DISCONTINUED | OUTPATIENT
Start: 2023-09-01 | End: 2023-09-07

## 2023-09-01 RX ORDER — OLANZAPINE 15 MG/1
15 TABLET, FILM COATED ORAL AT BEDTIME
Refills: 0 | Status: DISCONTINUED | OUTPATIENT
Start: 2023-09-01 | End: 2023-09-01

## 2023-09-01 RX ORDER — DIVALPROEX SODIUM 500 MG/1
1000 TABLET, DELAYED RELEASE ORAL AT BEDTIME
Refills: 0 | Status: DISCONTINUED | OUTPATIENT
Start: 2023-09-01 | End: 2023-09-01

## 2023-09-01 RX ORDER — DIPHENHYDRAMINE HCL 50 MG
25 CAPSULE ORAL EVERY 4 HOURS
Refills: 0 | Status: DISCONTINUED | OUTPATIENT
Start: 2023-09-01 | End: 2023-09-07

## 2023-09-01 RX ORDER — OLANZAPINE 15 MG/1
5 TABLET, FILM COATED ORAL AT BEDTIME
Refills: 0 | Status: COMPLETED | OUTPATIENT
Start: 2023-09-02 | End: 2023-09-02

## 2023-09-01 RX ORDER — DIVALPROEX SODIUM 500 MG/1
1000 TABLET, DELAYED RELEASE ORAL AT BEDTIME
Refills: 0 | Status: DISCONTINUED | OUTPATIENT
Start: 2023-09-02 | End: 2023-09-07

## 2023-09-01 RX ORDER — INFLUENZA VIRUS VACCINE 15; 15; 15; 15 UG/.5ML; UG/.5ML; UG/.5ML; UG/.5ML
0.5 SUSPENSION INTRAMUSCULAR ONCE
Refills: 0 | Status: DISCONTINUED | OUTPATIENT
Start: 2023-09-01 | End: 2023-09-07

## 2023-09-01 RX ORDER — HALOPERIDOL DECANOATE 100 MG/ML
2 INJECTION INTRAMUSCULAR EVERY 4 HOURS
Refills: 0 | Status: DISCONTINUED | OUTPATIENT
Start: 2023-09-01 | End: 2023-09-07

## 2023-09-01 RX ADMIN — OLANZAPINE 10 MILLIGRAM(S): 15 TABLET, FILM COATED ORAL at 20:59

## 2023-09-01 RX ADMIN — DIVALPROEX SODIUM 500 MILLIGRAM(S): 500 TABLET, DELAYED RELEASE ORAL at 20:59

## 2023-09-01 NOTE — CHART NOTE - NSCHARTNOTEFT_GEN_A_CORE
Screening Medical Evaluation    Patient Admitted from: [facility] admitting diagnosis: Recurrent major depressive disorder.       PAST MEDICAL & SURGICAL HISTORY:  Bipolar 1 disorder      No significant past surgical history            Allergies    penicillins (Unknown)  penicillin (Hives)  amoxicillin (Unknown)    Intolerances          Social History:       FAMILY HISTORY:        MEDICATIONS  (STANDING):  influenza   Vaccine 0.5 milliLiter(s) IntraMuscular once    MEDICATIONS  (PRN):  diphenhydrAMINE 25 milliGRAM(s) Oral every 4 hours PRN eps/agitation/insomnia  diphenhydrAMINE Injectable 50 milliGRAM(s) IntraMuscular once PRN eps/agitation  haloperidol     Tablet 2 milliGRAM(s) Oral every 4 hours PRN agitation  haloperidol    Injectable 5 milliGRAM(s) IntraMuscular once PRN agitation  LORazepam     Tablet 1 milliGRAM(s) Oral every 4 hours PRN agitation  LORazepam   Injectable 2 milliGRAM(s) IntraMuscular once PRN agitation  nicotine  Polacrilex Gum 2 milliGRAM(s) Oral every 2 hours PRN nicotine use disorder        Vital Signs Last 24 Hrs  T(C): 37 (01 Sep 2023 15:40), Max: 37 (01 Sep 2023 15:40)  T(F): 98.6 (01 Sep 2023 15:40), Max: 98.6 (01 Sep 2023 15:40)  HR: -- 106b/ min   BP: -- 150/ 82.   BP(mean): --  RR: -- 16b/ min.   SpO2: 100% (01 Sep 2023 15:40) (100% - 100%)    Parameters below as of 01 Sep 2023 15:40  Patient On (Oxygen Delivery Method): room air      CAPILLARY BLOOD GLUCOSE            PHYSICAL EXAM:  GENERAL: NAD.   HEAD:  Atraumatic, Normocephalic  EYES: EOMI,  conjunctiva and sclera clear  NECK: Supple, No JVD  CHEST/LUNG: Clear to auscultation bilaterally; No wheeze  HEART: Regular rate and rhythm; No murmurs, rubs, or gallops  ABDOMEN: Positive BS, Soft, Nontender.   EXTREMITIES:  2+ Peripheral Pulses, No clubbing, cyanosis, or edema  PSYCH: Calm and cooperative,   NEUROLOGY: non-focal  SKIN: No rashes or lesions seen on exposed skin.     LABS:                    RADIOLOGY & ADDITIONAL TESTS:      Assessment and Plan:  58F admitted to [facility] for Recurrent major depressive disorder. No PMHx. Pt seen for medical screening evaluation. Patient has no acute complaints at this time. Patient denies fever, chills, headache, dizziness, lightheadedness, N/V, SOB, cough, congestion, chest pain, abdominal pain, dysuria, hematuria, diarrhea, constipation. Physical exam unremarkable, VSS. Labs pending. EKG pending.     1.) Recurrent major depressive disorder: F/U EKG to assess QT/ QTC. Plan per primary team.

## 2023-09-01 NOTE — BH INPATIENT PSYCHIATRY ASSESSMENT NOTE - NSBHCONSULTIPREASON_PSY_A_CORE
danger to self; mental illness expected to respond to inpatient care
Patient/Caregiver provided printed discharge information.

## 2023-09-01 NOTE — BH INPATIENT PSYCHIATRY ASSESSMENT NOTE - PAST PSYCHOTROPIC MEDICATION
Lithium, haloperidol, olanzapine, Depakote ER. Lithium, haloperidol, olanzapine, Depakote ER, ziprasidone.

## 2023-09-01 NOTE — BH INPATIENT PSYCHIATRY ASSESSMENT NOTE - NSBHMETABOLIC_PSY_ALL_CORE_FT
BMI: BMI (kg/m2): 25.2 (07-26-23 @ 01:34)  HbA1c: A1C with Estimated Average Glucose Result: 5.5 % (08-25-23 @ 13:32)    Glucose: POCT Blood Glucose.: 189 mg/dL (07-25-23 @ 15:52)    BP: --  Lipid Panel: Date/Time: 05-26-23 @ 13:30  Cholesterol, Serum: 159  Direct LDL: --  HDL Cholesterol, Serum: 33  Total Cholesterol/HDL Ration Measurement: --  Triglycerides, Serum: 197   BMI: BMI (kg/m2): 28.3 (09-01-23 @ 15:40)  HbA1c: A1C with Estimated Average Glucose Result: 5.5 % (08-25-23 @ 13:32)    Glucose: POCT Blood Glucose.: 189 mg/dL (07-25-23 @ 15:52)    BP: --  Lipid Panel: Date/Time: 05-26-23 @ 13:30  Cholesterol, Serum: 159  Direct LDL: --  HDL Cholesterol, Serum: 33  Total Cholesterol/HDL Ration Measurement: --  Triglycerides, Serum: 197

## 2023-09-01 NOTE — BH INPATIENT PSYCHIATRY ASSESSMENT NOTE - MSE UNSTRUCTURED FT
Appearance: Dressed appropriately.  Behavior: Cooperative.    Motor: No abnormal movements, no psychomotor slowing or activation.  Speech: Regular rate.  Mood: "Fine."  Affect: Neutral.  Thought Process: Linear.  Associations: Fair.  Thought Content: Denies AVH.  Denies SIIP or HIIP.  Insight: Limited.  Judgment: Fair on interview.  Attention: Fair.  Language: Fluent.  Gait: Intact.  Appearance: Dressed appropriately.  Behavior: Cooperative.  Calm.    Motor: No abnormal movements, no psychomotor slowing or activation.  Speech: Regular rate, slightly dysarthric.  Mood: "I don't get rest."  Affect: Neutral.  Thought Process: [place].  Associations: Fair.  Thought Content: Intermittent AH and delusions of reference. Denies SIIP or HIIP.  Insight: Limited.  Judgment: Fair on interview.  Attention: Fair.  Language: Fluent.  Gait: Intact.

## 2023-09-01 NOTE — BH PATIENT PROFILE - HOME MEDICATIONS
hydroCHLOROthiazide 12.5 mg oral capsule , 1 cap(s) orally once a day  benztropine 0.5 mg oral tablet , 1 tab(s) orally every 12 hours  haloperidol 5 mg oral tablet , 1 tab(s) orally every 8 hours  budesonide-formoterol 80 mcg-4.5 mcg/inh inhalation aerosol , 2 inhaled  Albuterol (Eqv-ProAir HFA) 90 mcg/inh inhalation aerosol , 2 inhaled

## 2023-09-01 NOTE — BH INPATIENT PSYCHIATRY ASSESSMENT NOTE - NSBHASSESSSUMMFT_PSY_ALL_CORE
58F w/bipolar I disorder, 1 prior admission, no hx of SA or violence, admitted with montserrat/psychosis in context of recently stopping lithium due to toxicity.    1.   2. Collateral if pt provides HIPAA release. 58F w/bipolar I disorder, 1 prior admission, no hx of SA or violence, admitted with mood symptoms/psychosis in context of recently stopping lithium due to toxicity.  Unclear if truly manic given no overt euphoria or irritability, but does have other manic symptoms as well as psychosis.  Unclear how much rapid medication titrations (especially olanzapine) and inconsistent compliance are contributing to current presentation.  Pt notably has had increased anticholinergic burden in last 2-3 weeks with benztropine and olanzapine, may account for some of presentation.  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  Presentation may also be multifactorial.    1. Will prescribe Depakote  mg qhs tonight, and 1000 mg qhs tomorrow.  2. Taper down olanzapine by 5 mg/night.  3. Collateral if pt provides HIPAA release. 58F w/bipolar I disorder, 1 prior admission, no hx of SA or violence, admitted with mood symptoms/psychosis in context of recently stopping lithium due to toxicity.  Unclear if truly manic given no overt euphoria or irritability, but does have other manic symptoms as well as psychosis.  Unclear how much rapid medication titrations (especially olanzapine) and erratic medication schedule are contributing to current presentation.  Pt notably has had increased anticholinergic burden in last 2-3 weeks with benztropine and olanzapine, may account for some of presentation.  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  Presentation may also be multifactorial.    1. Will prescribe Depakote  mg qhs tonight, and 1000 mg qhs tomorrow.  2. Taper down olanzapine by 5 mg/night.  3. Collateral if pt provides HIPAA release. 58F w/bipolar I disorder, 1 prior admission, no hx of SA or violence, admitted with mood symptoms/psychosis in context of recently stopping lithium due to toxicity.  Unclear if truly manic given no overt euphoria or irritability, but does have other manic symptoms as well as psychosis.  Unclear how much rapid medication titrations (especially olanzapine) and erratic medication schedule are contributing to current presentation.  Pt notably has had increased anticholinergic burden in last 2-3 weeks with benztropine and olanzapine, may account for some of presentation.  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  Presentation may also be multifactorial.    1. Will prescribe Depakote  mg qhs tonight, and 1000 mg qhs tomorrow.  Pt had serum Depakote level of 102.5 on 8/29/2023 at 13:42 - unclear if this is true trough level and/or if it is even steady state.  2. Taper down olanzapine by 5 mg/night to discontinuation, unless pt able to sleep at any lower dose without any side effects.  3. Collateral if pt provides HIPAA release.

## 2023-09-01 NOTE — BH INPATIENT PSYCHIATRY ASSESSMENT NOTE - CURRENT MEDICATION
MEDICATIONS  (STANDING):    MEDICATIONS  (PRN):   MEDICATIONS  (STANDING):  divalproex ER 1000 milliGRAM(s) Oral at bedtime  influenza   Vaccine 0.5 milliLiter(s) IntraMuscular once  OLANZapine 15 milliGRAM(s) Oral at bedtime    MEDICATIONS  (PRN):  diphenhydrAMINE 25 milliGRAM(s) Oral every 4 hours PRN eps/agitation/insomnia  diphenhydrAMINE Injectable 50 milliGRAM(s) IntraMuscular once PRN eps/agitation  haloperidol     Tablet 2 milliGRAM(s) Oral every 4 hours PRN agitation  haloperidol    Injectable 5 milliGRAM(s) IntraMuscular once PRN agitation  LORazepam     Tablet 1 milliGRAM(s) Oral every 4 hours PRN agitation  LORazepam   Injectable 2 milliGRAM(s) IntraMuscular once PRN agitation  nicotine  Polacrilex Gum 2 milliGRAM(s) Oral every 2 hours PRN nicotine use disorder   MEDICATIONS  (STANDING):  divalproex  milliGRAM(s) Oral at bedtime  influenza   Vaccine 0.5 milliLiter(s) IntraMuscular once  OLANZapine 10 milliGRAM(s) Oral at bedtime    MEDICATIONS  (PRN):  diphenhydrAMINE 25 milliGRAM(s) Oral every 4 hours PRN eps/agitation/insomnia  diphenhydrAMINE Injectable 50 milliGRAM(s) IntraMuscular once PRN eps/agitation  haloperidol     Tablet 2 milliGRAM(s) Oral every 4 hours PRN agitation  haloperidol    Injectable 5 milliGRAM(s) IntraMuscular once PRN agitation  LORazepam     Tablet 1 milliGRAM(s) Oral every 4 hours PRN agitation  LORazepam   Injectable 2 milliGRAM(s) IntraMuscular once PRN agitation  nicotine  Polacrilex Gum 2 milliGRAM(s) Oral every 2 hours PRN nicotine use disorder

## 2023-09-01 NOTE — BH INPATIENT PSYCHIATRY ASSESSMENT NOTE - HPI (INCLUDE ILLNESS QUALITY, SEVERITY, DURATION, TIMING, CONTEXT, MODIFYING FACTORS, ASSOCIATED SIGNS AND SYMPTOMS)
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  As per outpatient provider, pt has been stable since last admission on lithium 600 mg and haloperidol 2.5 mg daily, however 1 month ago had lithium toxicology requiring hemodialysis.  Since then pt was started on olanzapine, titrated to 20 mg qhs, with oversedation, now currently at 15 mg qhs.  Pt was started on Depakote ER on 8/24/2023, titrated to 1000 mg qhs with serum level of 102 yesterday.  Pt remains manic with psychomotor activation, decreased need for sleep, racing thoughts, and noncommand auditory hallucinations.  Pt with declining functioning, unknown if having any missed doses/noncompliance with meds as a result. 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      Received handoff from outpatient provider BLAYNE Castorena: Pt has been stable since last admission on lithium 600 mg and haloperidol 2.5 mg daily, however 1 month ago had lithium toxicology requiring hemodialysis.  Since then pt was started on olanzapine, titrated to 20 mg qhs, with oversedation, now currently at 15 mg qhs.  Pt was started on Depakote ER on 8/24/2023, titrated to 1000 mg qhs with serum level of 102 yesterday.  Pt remains manic with psychomotor activation, decreased need for sleep, racing thoughts, and noncommand auditory hallucinations.  Pt with declining functioning, unknown if having any missed doses/noncompliance with meds as a result.    Review of recent [facility] documentation shows that pt presented to [facility] ER on 7/26/2023 with 3 days of lethargy, nausea, diarrhea.  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  Pt was discharged home 8/4/2023 on haloperidol 5 mg TID and HCTZ 12.5 mg daily.    Review of [facility] documentation:  8/8 E&M Note: Pt reporting oversedation, but daughter stating pt was back at baseline.  Haloperidol decreased from 5 mg TID to 5 mg AM + 7.5 mg QHS.  8/15 E&M Note: Pt presented with inability to sleep, akathisia, racing thoughts, disorganization. Haloperidol was discontinued, and olanzapine 10 mg qhs was started with instructions to increase to 20 mg if still having inability to sleep.  8/22 E&M Note: Pt reporting improved sleep 7-8 hrs but still with increased energy, increased goal directed activity, and pressured speech, AH, and referential thinking (TV/radio talking to her).  Olanzapine was increased to 20 mg qhs.  Depakote  mg BID.  Benztropine was discontinued.  No longer taking HCTZ at this point.    8/29 E&M Note: Pt has been sleeping less on Depakote, increase in daytime sleepiness, with side effects from likely Zyprexa, falls/forgetfulness, slurred speech due to dry mouth.  Pt still reporting increased energy and goal directed behavior.  Olanzapine decreased to 15 mg qhs.  Depakote ER consolidated to 1000 mg qhs.    Pt on interview states that she does not remember much from [facility] admission, states she was told she had lithium toxicity and that she needed hemodialysis.  Pt states recently she has not been able to sleep well, sometimes sleeps in daytime, sometimes cannot sleep overnight.  She states others have told her she is talking too much or too fast.  She denies feeling overly happy or angry.  Pt also feels she is having increased activity and energy, states she has been cleaning non stop.  She denies any excessive spending, inappropriate sexual encounters, or any other abnormal behaviors.  She reports hearing voices, sometimes male, sometimes female, unrecognizable, and she cannot make out the content.  She also feels at times that TV is talking to her or about her and she will turn off TV.  She denies any past or recent thoughts of harming self or others.  Pt states she has a hard time remembering to take medications, does not remember the names but does remember the doses, but cannot say with certainty that she has been taking them regularly, though she thinks she may not have taken any today.

## 2023-09-01 NOTE — BH PATIENT PROFILE - FALL HARM RISK - UNIVERSAL INTERVENTIONS
Bed in lowest position, wheels locked, appropriate side rails in place/Call bell, personal items and telephone in reach/Instruct patient to call for assistance before getting out of bed or chair/Non-slip footwear when patient is out of bed/Street to call system/Physically safe environment - no spills, clutter or unnecessary equipment/Purposeful Proactive Rounding/Room/bathroom lighting operational, light cord in reach

## 2023-09-01 NOTE — BH INPATIENT PSYCHIATRY ASSESSMENT NOTE - OTHER PAST PSYCHIATRIC HISTORY (INCLUDE DETAILS REGARDING ONSET, COURSE OF ILLNESS, INPATIENT/OUTPATIENT TREATMENT)
Bipolar I disorder.  1 prior [facility] admission in 2016.  Followed at [facility] by  No known prior suicide attempts.    Bipolar I disorder.  1 prior [facility] admission in 2016.  Followed at [facility] by BLAYNE Castorena  No known prior suicide attempts.

## 2023-09-01 NOTE — BH PATIENT PROFILE - CENTRAL VENOUS CATHETER/PICC LINE
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    Surgeon: Elver Trimble DPM   Assistant(s): None   Anesthesia: MAC   Estimated blood loss:   None     Drains: None   Specimens: Rather large stump neuroma to gross and microscopic   Implants: None    Findings: Large stump neuroma   Complications: None   Condition: Good                                                           no

## 2023-09-01 NOTE — BH INPATIENT PSYCHIATRY ASSESSMENT NOTE - NSBHCHARTREVIEWVS_PSY_A_CORE FT
Vital Signs Last 24 Hrs  T(C): --  T(F): --  HR: --  BP: --  BP(mean): --  RR: --  SpO2: --     Vital Signs Last 24 Hrs  T(C): 37 (09-01-23 @ 15:40), Max: 37 (09-01-23 @ 15:40)  T(F): 98.6 (09-01-23 @ 15:40), Max: 98.6 (09-01-23 @ 15:40)  HR: --  BP: --  BP(mean): --  RR: --  SpO2: 100% (09-01-23 @ 15:40) (100% - 100%)    Orthostatic VS  09-01-23 @ 15:40  Lying BP: --/-- HR: --  Sitting BP: 152/83 HR: 103  Standing BP: 150/82 HR: 106  Site: --  Mode: --

## 2023-09-02 LAB
ALBUMIN SERPL ELPH-MCNC: 3.9 G/DL — SIGNIFICANT CHANGE UP (ref 3.3–5)
ALP SERPL-CCNC: 75 U/L — SIGNIFICANT CHANGE UP (ref 40–120)
ALT FLD-CCNC: 10 U/L — SIGNIFICANT CHANGE UP (ref 4–33)
ANION GAP SERPL CALC-SCNC: 11 MMOL/L — SIGNIFICANT CHANGE UP (ref 7–14)
AST SERPL-CCNC: 11 U/L — SIGNIFICANT CHANGE UP (ref 4–32)
BASOPHILS # BLD AUTO: 0.03 K/UL — SIGNIFICANT CHANGE UP (ref 0–0.2)
BASOPHILS NFR BLD AUTO: 0.8 % — SIGNIFICANT CHANGE UP (ref 0–2)
BILIRUB SERPL-MCNC: <0.2 MG/DL — SIGNIFICANT CHANGE UP (ref 0.2–1.2)
BUN SERPL-MCNC: 21 MG/DL — SIGNIFICANT CHANGE UP (ref 7–23)
CALCIUM SERPL-MCNC: 8.9 MG/DL — SIGNIFICANT CHANGE UP (ref 8.4–10.5)
CHLORIDE SERPL-SCNC: 111 MMOL/L — HIGH (ref 98–107)
CO2 SERPL-SCNC: 23 MMOL/L — SIGNIFICANT CHANGE UP (ref 22–31)
CREAT SERPL-MCNC: 1.09 MG/DL — SIGNIFICANT CHANGE UP (ref 0.5–1.3)
EGFR: 58 ML/MIN/1.73M2 — LOW
EOSINOPHIL # BLD AUTO: 0.01 K/UL — SIGNIFICANT CHANGE UP (ref 0–0.5)
EOSINOPHIL NFR BLD AUTO: 0.3 % — SIGNIFICANT CHANGE UP (ref 0–6)
GLUCOSE SERPL-MCNC: 103 MG/DL — HIGH (ref 70–99)
HCT VFR BLD CALC: 33.9 % — LOW (ref 34.5–45)
HGB BLD-MCNC: 10.8 G/DL — LOW (ref 11.5–15.5)
IANC: 1.81 K/UL — SIGNIFICANT CHANGE UP (ref 1.8–7.4)
IMM GRANULOCYTES NFR BLD AUTO: 0.8 % — SIGNIFICANT CHANGE UP (ref 0–0.9)
LYMPHOCYTES # BLD AUTO: 1.56 K/UL — SIGNIFICANT CHANGE UP (ref 1–3.3)
LYMPHOCYTES # BLD AUTO: 40.2 % — SIGNIFICANT CHANGE UP (ref 13–44)
MCHC RBC-ENTMCNC: 30.9 PG — SIGNIFICANT CHANGE UP (ref 27–34)
MCHC RBC-ENTMCNC: 31.9 GM/DL — LOW (ref 32–36)
MCV RBC AUTO: 97.1 FL — SIGNIFICANT CHANGE UP (ref 80–100)
MONOCYTES # BLD AUTO: 0.44 K/UL — SIGNIFICANT CHANGE UP (ref 0–0.9)
MONOCYTES NFR BLD AUTO: 11.3 % — SIGNIFICANT CHANGE UP (ref 2–14)
NEUTROPHILS # BLD AUTO: 1.81 K/UL — SIGNIFICANT CHANGE UP (ref 1.8–7.4)
NEUTROPHILS NFR BLD AUTO: 46.6 % — SIGNIFICANT CHANGE UP (ref 43–77)
NRBC # BLD: 0 /100 WBCS — SIGNIFICANT CHANGE UP (ref 0–0)
NRBC # FLD: 0 K/UL — SIGNIFICANT CHANGE UP (ref 0–0)
PLATELET # BLD AUTO: 224 K/UL — SIGNIFICANT CHANGE UP (ref 150–400)
POTASSIUM SERPL-MCNC: 5 MMOL/L — SIGNIFICANT CHANGE UP (ref 3.5–5.3)
POTASSIUM SERPL-SCNC: 5 MMOL/L — SIGNIFICANT CHANGE UP (ref 3.5–5.3)
PROT SERPL-MCNC: 7.2 G/DL — SIGNIFICANT CHANGE UP (ref 6–8.3)
RBC # BLD: 3.49 M/UL — LOW (ref 3.8–5.2)
RBC # FLD: 14.6 % — HIGH (ref 10.3–14.5)
SODIUM SERPL-SCNC: 145 MMOL/L — SIGNIFICANT CHANGE UP (ref 135–145)
TOXICOLOGY SCREEN, DRUGS OF ABUSE, SERUM RESULT: SIGNIFICANT CHANGE UP
WBC # BLD: 3.88 K/UL — SIGNIFICANT CHANGE UP (ref 3.8–10.5)
WBC # FLD AUTO: 3.88 K/UL — SIGNIFICANT CHANGE UP (ref 3.8–10.5)

## 2023-09-02 PROCEDURE — 93010 ELECTROCARDIOGRAM REPORT: CPT

## 2023-09-02 PROCEDURE — 99231 SBSQ HOSP IP/OBS SF/LOW 25: CPT

## 2023-09-02 RX ADMIN — DIVALPROEX SODIUM 1000 MILLIGRAM(S): 500 TABLET, DELAYED RELEASE ORAL at 20:43

## 2023-09-02 RX ADMIN — OLANZAPINE 5 MILLIGRAM(S): 15 TABLET, FILM COATED ORAL at 20:43

## 2023-09-02 NOTE — BH INPATIENT PSYCHIATRY PROGRESS NOTE - NSBHFUPINTERVALHXFT_PSY_A_CORE
chart reviewed including pertinent labs. Case discussed with nursing staff. pt acknolwedges not being herself. she does feel better this AM, states she slept great last night. she is adherent to treatment. denies racing thoughts. no SI or HI. no avh

## 2023-09-02 NOTE — PSYCHIATRIC REHAB INITIAL EVALUATION - NSBHALCSUBTREAT_PSY_ALL_CORE
-1 prior admission to [facility] in 2016, in outpatient care at [facility] since that time/Outpatient clinic (specify)

## 2023-09-02 NOTE — BH INPATIENT PSYCHIATRY PROGRESS NOTE - NSBHASSESSSUMMFT_PSY_ALL_CORE
58F w/bipolar I disorder, 1 prior admission, no hx of SA or violence, admitted with mood symptoms/psychosis in context of recently stopping lithium due to toxicity.  Unclear if truly manic given no overt euphoria or irritability, but does have other manic symptoms as well as psychosis.  Unclear how much rapid medication titrations (especially olanzapine) and erratic medication schedule are contributing to current presentation.  Pt notably has had increased anticholinergic burden in last 2-3 weeks with benztropine and olanzapine, may account for some of presentation.  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  Presentation may also be multifactorial.    over this interval, pt adjusting to milieu, slept well, speech is not pressured, denies racing thoughts. she denies avh. adherent to treatment. plan to increase VPA to 1000mg qhs for tonight.     1. increase VPA to 1000mg qhs   2. Olanzapine 5mg qhs x1 dose then discontinue   3. Collateral if pt provides HIPAA release.

## 2023-09-02 NOTE — PSYCHIATRIC REHAB INITIAL EVALUATION - NSBHPRRECOMMEND_PSY_ALL_CORE
Patient was seen individually by psych rehab staff to orient her to the unit and introduce her to psych rehab department and its functions as well as to assess functioning. Upon approach, patient was willing to meet with psych rehab staff. Patient was provided with a unit schedule and encouraged to attend daily psychiatric rehabilitation groups for improved insight and symptom management. Patient’s appearance was kempt and was observed to be dressed casually. Patient presents as calm. Patient was engaged and cooperative.   When asked what brought her to the facility, patient endorsed that she began experiencing manic symptoms due to recently stopping medication as well as recent stressors in her life. Per ED Behavioral Health Assessment Note, patient is a: "58F w/bipolar I disorder, 1 prior admission, no hx of SA or violence, admitted with mood symptoms/psychosis in context of recently stopping lithium due to toxicity.  Unclear if truly manic given no overt euphoria or irritability, but does have other manic symptoms as well as psychosis.  Unclear how much rapid medication titrations (especially olanzapine) and erratic medication schedule are contributing to current presentation.  Pt notably has had increased anticholinergic burden in last 2-3 weeks with benztropine and olanzapine, may account for some of presentation.  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  Presentation may also be multifactorial".   Patient and psych rehab staff were able to identify a collaborative goal which is being able to identify the early signs of montserrat (i.e. sleep and mood changes) and employing coping strategies to minimize acting out. Psych rehab staff will provide counseling and daily group therapy to assist patient in achieving therapeutic goals. Psych rehab staff will also continue to engage patient daily in order to build therapeutic rapport. Patient denies SI/HI/AH/VH. Psych rehab recommends that patient attend individual and group therapy for support, psychoeducation and skill-integration as well as to facilitate progress towards specified goal.

## 2023-09-02 NOTE — BH INPATIENT PSYCHIATRY PROGRESS NOTE - MSE UNSTRUCTURED FT
Appearance: Dressed appropriately.  Behavior: Cooperative.  Calm.    Motor: No abnormal movements, no psychomotor slowing or activation.  Speech: Regular rate, spontaneous.   Mood: "much better"  Affect: Neutral.  Thought Process: [place].  Associations: Fair.  Thought Content: denies AVH. Denies SIIP or HIIP.  Insight: Limited.  Judgment: Fair on interview.  Attention: Fair.  Language: Fluent.  Gait: Intact.

## 2023-09-03 PROCEDURE — 99231 SBSQ HOSP IP/OBS SF/LOW 25: CPT

## 2023-09-03 RX ORDER — LANOLIN ALCOHOL/MO/W.PET/CERES
3 CREAM (GRAM) TOPICAL AT BEDTIME
Refills: 0 | Status: DISCONTINUED | OUTPATIENT
Start: 2023-09-03 | End: 2023-09-07

## 2023-09-03 RX ADMIN — Medication 25 MILLIGRAM(S): at 03:05

## 2023-09-03 RX ADMIN — Medication 3 MILLIGRAM(S): at 22:04

## 2023-09-03 RX ADMIN — DIVALPROEX SODIUM 1000 MILLIGRAM(S): 500 TABLET, DELAYED RELEASE ORAL at 21:03

## 2023-09-03 NOTE — BH INPATIENT PSYCHIATRY PROGRESS NOTE - NSBHFUPINTERVALHXFT_PSY_A_CORE
chart reviewed including pertinent labs. Case discussed with nursing staff. patient asks when she could go home, states she prefers to have her treatment done at the comfort of her own home, states she misses her grandchildren. she feels she slept well last night and relates it was the best night of sleep she had in over 3 weeks. Per sleep log, sleep was fragmented and slept probably 3-4 hours last night. she is taking medications. she denies AVH. denies SI

## 2023-09-03 NOTE — BH INPATIENT PSYCHIATRY PROGRESS NOTE - MSE UNSTRUCTURED FT
Appearance: Dressed appropriately.  Behavior: somewhat sedated   Motor: No abnormal movements, no psychomotor slowing or activation.  Speech: Regular rate, spontaneous.   Mood: "good"  Affect: Neutral.  Thought Process: [place].  Associations: Fair.  Thought Content: denies AVH. Denies SIIP or HIIP.  Insight: Limited.  Judgment: Fair on interview.  Attention: Fair.  Language: Fluent.  Gait: Intact.

## 2023-09-03 NOTE — BH INPATIENT PSYCHIATRY PROGRESS NOTE - NSBHASSESSSUMMFT_PSY_ALL_CORE
58F w/bipolar I disorder, 1 prior admission, no hx of SA or violence, admitted with mood symptoms/psychosis in context of recently stopping lithium due to toxicity.  Unclear if truly manic given no overt euphoria or irritability, but does have other manic symptoms as well as psychosis.  Unclear how much rapid medication titrations (especially olanzapine) and erratic medication schedule are contributing to current presentation.  Pt notably has had increased anticholinergic burden in last 2-3 weeks with benztropine and olanzapine, may account for some of presentation.  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  Presentation may also be multifactorial.    fragmented sleep but otherwise not exhibiting pressured speech, denies racing thoughts, denies elevated energy, not psychomotor activated. plan as below     1. c/w VPA to 1000mg qhs   2. Olanzapine STOPPED   3. Collateral if pt provides HIPAA release.

## 2023-09-04 PROCEDURE — 99231 SBSQ HOSP IP/OBS SF/LOW 25: CPT

## 2023-09-04 RX ADMIN — Medication 3 MILLIGRAM(S): at 21:28

## 2023-09-04 RX ADMIN — DIVALPROEX SODIUM 1000 MILLIGRAM(S): 500 TABLET, DELAYED RELEASE ORAL at 21:28

## 2023-09-04 NOTE — BH INPATIENT PSYCHIATRY PROGRESS NOTE - MSE UNSTRUCTURED FT
Appearance: Dressed appropriately.  Behavior: somewhat sedated   Motor: No abnormal movements, no psychomotor slowing or activation.  Speech: Regular rate, spontaneous.   Mood: "fine"  Affect: Neutral.  Thought Process: linear  Associations: Fair.  Thought Content: denies AVH. Denies SIIP or HIIP.  Insight: Limited.  Judgment: Fair on interview.  Attention: Fair.  Language: Fluent.  Gait: Intact.

## 2023-09-04 NOTE — BH INPATIENT PSYCHIATRY PROGRESS NOTE - NSBHFUPINTERVALHXFT_PSY_A_CORE
chart reviewed including pertinent labs. Case discussed with nursing staff. pt slept poorly last night per sleep log. she is adherent to medications. she tells me she heard voices (unsure whether it was 1 week or 1 month ago) telling her to "take more" in relation to her Lithium. currently denies AH. denies SI or HI.

## 2023-09-04 NOTE — BH INPATIENT PSYCHIATRY PROGRESS NOTE - NSBHASSESSSUMMFT_PSY_ALL_CORE
58F w/bipolar I disorder, 1 prior admission, no hx of SA or violence, admitted with mood symptoms/psychosis in context of recently stopping lithium due to toxicity.  Unclear if truly manic given no overt euphoria or irritability, but does have other manic symptoms as well as psychosis.  Unclear how much rapid medication titrations (especially olanzapine) and erratic medication schedule are contributing to current presentation.  Pt notably has had increased anticholinergic burden in last 2-3 weeks with benztropine and olanzapine, may account for some of presentation.  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  Presentation may also be multifactorial.    poor sleep but no pressured speech, no formal thought disorder, no elevation in energy / restlessness. plan as below     1. c/w VPA to 1000mg qhs   2. Olanzapine STOPPED   3. Collateral if pt provides HIPAA release.

## 2023-09-05 RX ADMIN — DIVALPROEX SODIUM 1000 MILLIGRAM(S): 500 TABLET, DELAYED RELEASE ORAL at 20:47

## 2023-09-05 RX ADMIN — Medication 3 MILLIGRAM(S): at 20:47

## 2023-09-05 NOTE — BH INPATIENT PSYCHIATRY PROGRESS NOTE - NSBHATTESTCOMMENTATTENDFT_PSY_A_CORE
Pt continues to have presentation concerning for more cognitive issues - pt contradicts self throughout interview, is poor historian, does not recall "Depakote" but able to recall lithium, states she has to register for both.  Still complains of some tremulousness and confusion.  Does not appear wholly manic or depressed.  Diagnosis remains equivocal.  Reduction of anticholinergic burden and regular supervised Depakote dosing has not elicited any improvement.  Continue meds as ordered for now.

## 2023-09-05 NOTE — BH INPATIENT PSYCHIATRY PROGRESS NOTE - NSBHFUPINTERVALHXFT_PSY_A_CORE
Pt found in dining torres this morning and introduced to primary inpatient team. Pt says that she is doing well and has no complaints re her stay at [facility]; says that food is good and that she has had no issues w her roommate, other than roommate being quiet at baseline. Pt says that she did not sleep all night, as she was waiting to call her daughter at 7 am and did not want to sleep until then; says that she was tired while waiting. Pt first denies then admits hx of bipolar 1, and recounts lithium toxicity which initially brought her to [facility]; unable to provide detailed hx of these events. Pt states that since she has been off of Lithium, her mind feels 'cloudy' and that she cannot think straight; denies racing thoughts, sleeplessness, reduced appetite, but says that others have mentioned to her that she seems to be walking and talking quickly. Pt unable to list current medication names or doses. Pt says that she would like to go home, as she believes that her healing process will not be complete until she is at home. Pt goes on tangent, telling staff about schedule of work and sleep before she entered hospital.   Staff explained that the goal is to stabilize her mood and medication regimen before dc, which pt seemed satisfied with. Pt filed 3-day letter re desired discharge later this AM.   Pt denies SIIH/HIIH as well as AVH.  Pt found in dining torres this morning and introduced to primary inpatient team. Pt says that she is doing well and has no complaints re her stay at [facility]; says that food is good and that she has had no issues w her roommate, other than roommate being quiet at baseline. Pt says that she did not sleep all night, as she was waiting to call her daughter at 7 am and did not want to sleep until then; says that she was tired while waiting. Pt first denies then admits hx of bipolar 1, and recounts lithium toxicity which initially brought her to [facility]; unable to provide detailed hx of these events. Pt states that since she has been off of Lithium, her mind feels 'cloudy' and that she cannot think straight; otherwise she feels 'calm'. Pt denies racing thoughts, sleeplessness, reduced appetite, but says that others have mentioned to her that she seems to be walking and talking quickly. Pt unable to list current medication names or doses. Pt says that she would like to go home, as she believes that her healing process will not be complete until she is at home. Pt goes on tangent, telling staff about schedule of work and sleep before she entered hospital.   Staff explained that the goal is to stabilize her mood and medication regimen before dc, which pt seemed satisfied with. Pt then filed 3-day letter re desired discharge later this AM.   Pt denies SIIH/HIIH as well as AVH.     on collateral w/ pt's daughter and healthcare proxy Rozina Flores ([phone])   Pt admitted to [facility] in July 2023 due to Lithium toxicity of unknown cause. dc on Haloperidol, with resulting akithisia and manic episodes. Haloperidol dc and Olanzapine started by outpatient psychiatric NP.   Since dc from [facility], pt displaying abnormal behaviors such as sleeping for 30 min several times during day, as well as sleeping only 2-3 hours each night. Pt appears paranoid and avoidant of other people, and has memories of things which have not happened as well as no memory of recent events that have happened. Pt expressed to NP that she believed voices in the TV were talking to her. Pt has not returned to work since hospitalization in July.   at baseline, pt self sufficient, taking meds on her own, driving to work etc, and lives with daughter.  Daughter unsure cause of patient's current presentation; believes that it may be a combination of montserrat and poor medication regimen. Outpatient psychiatric NP (Ester Castorena, [facility]) believes pt has been presenting with montserrat.   Daughter aware of patient's desire for dc and informed of patient submitting 3 day letter; has spoken to pt during current stay and believes that she is not at baseline and is not ready for dc.

## 2023-09-05 NOTE — BH SOCIAL WORK INITIAL PSYCHOSOCIAL EVALUATION - OTHER PAST PSYCHIATRIC HISTORY (INCLUDE DETAILS REGARDING ONSET, COURSE OF ILLNESS, INPATIENT/OUTPATIENT TREATMENT)
58F w/bipolar I disorder, 1 prior admission, no hx of SA or violence, admitted with mood symptoms/psychosis in context of recently stopping lithium due to toxicity.

## 2023-09-05 NOTE — BH INPATIENT PSYCHIATRY PROGRESS NOTE - NSBHASSESSSUMMFT_PSY_ALL_CORE
58F w/bipolar I disorder, 1 prior admission, no hx of SA or violence, admitted with mood symptoms/psychosis in context of recently stopping lithium due to toxicity.  Unclear if truly manic given no overt euphoria or irritability, but does have other manic symptoms as well as psychosis.  Unclear how much rapid medication titrations (especially olanzapine) and erratic medication schedule are contributing to current presentation.  Pt notably has had increased anticholinergic burden in last 2-3 weeks with benztropine and olanzapine, may account for some of presentation.  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  Presentation may also be multifactorial.    poor sleep but no pressured speech, no formal thought disorder, no elevation in energy / restlessness. plan as below     1. c/w VPA to 1000mg qhs   2. Olanzapine STOPPED   3. Collateral if pt provides HIPAA release. 58F w/bipolar I disorder, 1 prior admission, no hx of SA or violence, admitted with mood symptoms/psychosis in context of recently stopping lithium due to toxicity.  Unclear if truly manic given no overt euphoria or irritability, but does have other manic symptoms as well as psychosis.  Unclear how much rapid medication titrations (especially olanzapine) and erratic medication schedule are contributing to current presentation.  Pt notably has had increased anticholinergic burden in last 2-3 weeks with benztropine and olanzapine, may account for some of presentation.  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  Presentation may also be multifactorial.    poor sleep but no pressured speech, no formal thought disorder, no elevation in energy / restlessness. Submitted 3 day later on 09/05/2023 early afternoon; daughter and healthcare proxy, with whom pt lives, does not believe pt is suitable for d/c.     plan as below     1. c/w VPA to 1000mg qhs   2. Olanzapine STOPPED   3. dispo - pt with 3 day letter and voluntary status, daughter/proxy, with whom pt lives, does not believe that pt is suitable for dc.   3. Collateral if pt provides HIPAA release.

## 2023-09-05 NOTE — BH PSYCHOLOGY - GROUP THERAPY NOTE - NSBHPSYCHOLRESPONSE_PSY_A_CORE FT
Patient admitted to the floor at 1830. VS stable. A & O x 4.   Diet:regular, only ate some chicken broth this evening for dinner. Tolerating well, no N/V.   Ambulates: SBA as patient was dizzy prior to arriving at the hospital. Otherwise, normally is independent.   IV meds: IVF with 20 meq of K+ running at 125 ml/hr.   Pain:denies   Abnormal labs: K+ 3.1, no replacement orders in place but getting IVF with K+. Cdiff culture pending, education provided on isolation precautions. Urine culture resent down, pending results.   Abnormal assessments: Patient denies N/V, has not had stool since arriving on the floor.   Discharge plan: not yet in place . Will continue to monitor and review plan of care.     The patient appeared adequately groomed and casually dressed. Pt appeared engaged in the group as evidenced by their willingness to independently verbally communicate during the discussion. Pt talked about the importance of prioritizing and balancing ones thoughts, insurance being a barrier to seeking care, as well as how she struggles with sleep and grogginess' but tries to stay engaged and go to groups. Pt shared that she may nod off during group due to that, was seen nodding off once but was fully engaged in group and shared about her experience with feeling this way. Pt was oriented X3. Pt was appropriate with peers and encouraged one other Pt to leave group room once group concluded.

## 2023-09-05 NOTE — BH INPATIENT PSYCHIATRY PROGRESS NOTE - MSE UNSTRUCTURED FT
Appearance: Dressed appropriately.  Behavior: somewhat sedated   Motor: No abnormal movements, no psychomotor slowing or activation.  Speech: Regular rate, spontaneous.   Mood: "calm"  Affect: Neutral.  Thought Process: linear  Associations: Fair.  Thought Content: denies AVH. Denies SIIP or HIIP.  Insight: Limited.  Judgment: Fair on interview.  Attention: Fair.  Language: Fluent.  Gait: Intact.  Appearance: Dressed appropriately.  Behavior: somewhat sedated   Motor: No abnormal movements, no psychomotor slowing or activation.  Speech: Regular rate, spontaneous.   Mood: "calm"  Affect: Neutral.  Thought Process: linear  Associations: Fair, somewhat loose  Thought Content: denies AVH. Denies SIIP or HIIP.  Insight: Limited.  Judgment: Fair on interview.  Attention: Fair.  Language: Fluent.  Gait: Intact.  Appearance: Dressed appropriately.  Behavior: Calm, cooperative.   Motor: No abnormal movements, no psychomotor slowing or activation.  Speech: Regular rate, spontaneous.   Mood: "calm"  Affect: Neutral.  Thought Process: Vague, contradicting  Associations: Fair, somewhat loose  Thought Content: denies AVH. Denies SIIP or HIIP.  Insight: Limited.  Judgment: Fair on interview.  Attention: Fair.  Language: Fluent.  Gait: Intact.

## 2023-09-05 NOTE — BH SOCIAL WORK INITIAL PSYCHOSOCIAL EVALUATION - NSBHHOUSECOMMENTFT_PSY_ALL_CORE
Pt resides with her partner (Inocente) and her daughter (Rozina) and her grandson (Declan) live upstairs. Pt states it is her home and there are no barriers to her returning.

## 2023-09-06 PROCEDURE — 99232 SBSQ HOSP IP/OBS MODERATE 35: CPT

## 2023-09-06 RX ADMIN — DIVALPROEX SODIUM 1000 MILLIGRAM(S): 500 TABLET, DELAYED RELEASE ORAL at 21:11

## 2023-09-06 RX ADMIN — Medication 2 MILLIGRAM(S): at 18:25

## 2023-09-06 RX ADMIN — Medication 3 MILLIGRAM(S): at 21:11

## 2023-09-06 NOTE — BH INPATIENT PSYCHIATRY PROGRESS NOTE - NSBHASSESSSUMMFT_PSY_ALL_CORE
58F w/bipolar I disorder, 1 prior admission, no hx of SA or violence, admitted with mood symptoms/psychosis in context of recently stopping lithium due to toxicity.  Unclear if truly manic given no overt euphoria or irritability, but does have other manic symptoms as well as psychosis.  Unclear how much rapid medication titrations (especially olanzapine) and erratic medication schedule are contributing to current presentation.  Pt notably has had increased anticholinergic burden in last 2-3 weeks with benztropine and olanzapine, may account for some of presentation.  Also given recent lithium intoxication, presentation could be sequelae of Syndrome of Irreversible Lithium Effectuated Neurotoxicity (however has not met time criteria of persistent symptoms >2 months).  Presentation may also be multifactorial.    No overt full manic episode, though some overproduction of speech and 1-2 rapid changes in affect.    Plan  1. Depakote ER restarted and titrated to 1000mg qhs (unclear if compliant at home).  Level tomorrow AM.   2. Tapered and discontinued olanzapine to minimize anticholinergic burden.   3. Dispo: AOPD and home when stable.

## 2023-09-06 NOTE — BH INPATIENT PSYCHIATRY PROGRESS NOTE - NSBHFUPINTERVALHXFT_PSY_A_CORE
No overnight events.  Pt states she would like lithium again, states team can check her liver, kidneys, every 6 months, 3 months, 1 month.  States if it only gives her a week to live she would be ok with that.  States she wants a medication that keeps her at her usual self in the morning, during the day, and throughout the night.  States that she has a sense of humor and that the environment is not consistent with that, asks if she is being punished.  Becomes tearful stating that she gives too much of herself to others.  Denies any thoughts of harming self or others.

## 2023-09-06 NOTE — BH INPATIENT PSYCHIATRY PROGRESS NOTE - NSBHATTESTBILLING_PSY_A_CORE
65592-Ksougcqpem OBS or IP - low complexity OR 25-34 mins
[id number]-Jehnszephq OBS or IP - low complexity OR 25-34 mins
[id number]-Fptyttzqzy OBS or IP - moderate complexity OR 35-49 mins
[id number]-Jzbwetexsu OBS or IP - low complexity OR 25-34 mins
[id number]-Ckijvoqssf OBS or IP - moderate complexity OR 35-49 mins

## 2023-09-06 NOTE — BH INPATIENT PSYCHIATRY PROGRESS NOTE - MSE UNSTRUCTURED FT
Appearance: Dressed appropriately.  Behavior: Calm, cooperative.   Motor: No abnormal movements, no psychomotor slowing or activation.  Speech: Overproductive today.   Mood: "calm"  Affect: Laughing using humor appropriately, but then becomes tearful, mild lability.   Thought Process: Vague, contradicting at times  Associations: At times somewhat loose  Thought Content: Difficulty clearly formulating thoughts.  Denies SIIP or HIIP.  Insight: Limited.  Judgment: Fair on interview.  Attention: Fair.  Language: Some word finding difficulty  Gait: Intact.     MMSE: 24/30 with serial 7s, 28/30 with WORLD backwards

## 2023-09-07 VITALS — TEMPERATURE: 99 F

## 2023-09-07 LAB — VALPROATE SERPL-MCNC: 99.9 UG/ML — SIGNIFICANT CHANGE UP (ref 50–100)

## 2023-09-07 PROCEDURE — 90853 GROUP PSYCHOTHERAPY: CPT

## 2023-09-07 PROCEDURE — 99238 HOSP IP/OBS DSCHRG MGMT 30/<: CPT

## 2023-09-07 RX ORDER — DIVALPROEX SODIUM 500 MG/1
2 TABLET, DELAYED RELEASE ORAL
Qty: 28 | Refills: 0
Start: 2023-09-07 | End: 2023-09-20

## 2023-09-07 RX ORDER — ALBUTEROL 90 UG/1
2 AEROSOL, METERED ORAL
Refills: 0 | DISCHARGE

## 2023-09-07 RX ORDER — LANOLIN ALCOHOL/MO/W.PET/CERES
1 CREAM (GRAM) TOPICAL
Qty: 14 | Refills: 0
Start: 2023-09-07 | End: 2023-09-20

## 2023-09-07 RX ORDER — BUDESONIDE AND FORMOTEROL FUMARATE DIHYDRATE 160; 4.5 UG/1; UG/1
2 AEROSOL RESPIRATORY (INHALATION)
Refills: 0 | DISCHARGE

## 2023-09-07 RX ADMIN — Medication 2 MILLIGRAM(S): at 13:15

## 2023-09-07 RX ADMIN — Medication 2 MILLIGRAM(S): at 10:16

## 2023-09-07 NOTE — BH PSYCHOLOGY - CLINICIAN PSYCHOTHERAPY NOTE - NSBHPSYCHOLGOALS_PSY_A_CORE
Decrease symptoms/Assessment/Improve social/vocational/coping skills/Psychoeducation/Treatment compliance

## 2023-09-07 NOTE — BH DISCHARGE NOTE NURSING/SOCIAL WORK/PSYCH REHAB - DISCHARGE INSTRUCTIONS AFTERCARE APPOINTMENTS
In order to check the location, date, or time of your aftercare appointment, please refer to your Discharge Instructions Document given to you upon leaving the hospital.  If you have lost the instructions please call [phone]

## 2023-09-07 NOTE — BH INPATIENT PSYCHIATRY PROGRESS NOTE - PRN MEDS
MEDICATIONS  (PRN):  diphenhydrAMINE 25 milliGRAM(s) Oral every 4 hours PRN eps/agitation/insomnia  diphenhydrAMINE Injectable 50 milliGRAM(s) IntraMuscular once PRN eps/agitation  haloperidol     Tablet 2 milliGRAM(s) Oral every 4 hours PRN agitation  haloperidol    Injectable 5 milliGRAM(s) IntraMuscular once PRN agitation  LORazepam     Tablet 1 milliGRAM(s) Oral every 4 hours PRN agitation  LORazepam   Injectable 2 milliGRAM(s) IntraMuscular once PRN agitation  nicotine  Polacrilex Gum 2 milliGRAM(s) Oral every 2 hours PRN nicotine use disorder  

## 2023-09-07 NOTE — BH INPATIENT PSYCHIATRY DISCHARGE NOTE - DESCRIPTION
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  Pt has children and grandchildren.

## 2023-09-07 NOTE — BH PSYCHOLOGY - CLINICIAN PSYCHOTHERAPY NOTE - NSTXMANICGOAL_PSY_ALL_CORE
Be able to identify the early signs of montserrat (e.g. sleep and mood changes) and to employ coping strategies to minimize acting out

## 2023-09-07 NOTE — BH INPATIENT PSYCHIATRY DISCHARGE NOTE - HOSPITAL COURSE
Pt had lithium toxicity one month prior to [facility] admission that required significant medical intervention including hospitalization at [facility] 7/26/2023-8/4/2023 and hemodialysis.  In weeks that followed discharge from [facility], pt began to have mood symptoms concerning for montserrat (described as irritability, sleep disturbances, racing thoughts, restlessness), as well as psychotic symptoms (reported to be non command auditory hallucinations, delusional ideas of reference).  Pt was admitted to [facility] on 9.13 on 9/1/2023. On arrival to , while pt was limited historian and did have sleep issues initially, pt did not exhibit any irritability, euphoria, pressured speech, grandiosity, flight of ideas, distractibility, or activities with painful consequences.  Pt did not exhibit any signs of positive symptoms of psychosis.      Pt requested to be put back on lithium with or without haloperidol, which both had kept her stable for many years in past, for maintenance treatment of her bipolar disorder.  However, given that etiology of recent lithium toxicity remained unknown and since reported restlessness may have been akathisia, she was not prescribed these two medications. Home medication of benztropine was not restarted, and olanzapine was tapered and discontinued, out of concern for anticholinergic side effects contributing to presentation (e.g. anticholinergic delirium) as well as akathisia.  Depakote ER 1000 mg qhs was continued along with melatonin 3 mg qhs.  Pt's sleep gradually improved to 7-8 hours.  She continued to remain free of any mood episode, psychosis, or restlessness.    Throughout admission, pt displayed some cognitive symptoms, primarily word finding difficulties, poor short term memory, and difficulty explaining complex thoughts and ideas.  Cognitive testing was equivocal. MMSE 24/30 with serial 7s but 28/30 with WORLD backwards.  Minicog was total score of 3, with 1 point for word recall and 2 points for clock draw (of note, pt had difficulty with clock - wrote 10 twice, and began to put hour hand at 10, however corrected self for both).  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  Pt and family were counseled to consider neurological follow up by next month if no further spontaneous improvement in cognition.    Pt was visible on unit, social with peers, attended groups and individual sessions with psychology, and participated well.  She was emotionally regulated and maintained good behavioral control.  Pt was compliant with medications and basic care, and attended to her ADLs independently.  Pt was future oriented and consistently denied suicidality and homicidality.  Pt submitted 3 day letter.   Treatment team did not have clinical indication to seek court order for involuntary retention, and pt was discharged home on 3 day letter.  Prior to discharge, pt stated she had contacted her NP at [facility] and secured herself a virtual follow up appointment on 9/8/2023 at 1PM. Pt had lithium toxicity one month prior to [facility] admission that required significant medical intervention including hospitalization at [facility] 7/26/2023-8/4/2023 and hemodialysis.  In weeks that followed discharge from [facility], pt began to have mood symptoms concerning for montserrat (described as irritability, sleep disturbances, racing thoughts, restlessness), as well as psychotic symptoms (reported to be non command auditory hallucinations, delusional ideas of reference).  Pt was admitted to [facility] on 9.13 on 9/1/2023. On arrival to , while pt was limited historian and provided subjective report of aforementioned symptoms, pt did not exhibit any irritability, euphoria, pressured speech, grandiosity, flight of ideas, distractibility, or activities with painful consequences.  Pt did not exhibit any objective signs of positive symptoms of psychosis.  The only observable confirmed mood symptom while on unit was insomnia.     Pt requested to be put back on lithium with or without haloperidol, which both had kept her stable for many years in past, for maintenance treatment of her bipolar disorder.  However, given that etiology of recent lithium toxicity remained unknown and since reported restlessness may have been akathisia, she was not prescribed these two medications. Home medication of benztropine was not restarted, and olanzapine was tapered and discontinued, out of concern for anticholinergic side effects contributing to presentation (e.g. anticholinergic delirium) as well as akathisia.  Depakote ER 1000 mg qhs was continued along with melatonin 3 mg qhs.  Pt's sleep gradually improved to 7-8 hours.  She continued to remain free of any mood episode, psychosis, or restlessness.    Throughout admission, pt displayed some cognitive symptoms, primarily word finding difficulties, poor short term memory, and difficulty explaining complex thoughts and ideas.  Cognitive testing was equivocal. MMSE 24/30 with serial 7s but 28/30 with WORLD backwards.  Minicog was total score of 3, with 1 point for word recall and 2 points for clock draw (of note, pt had difficulty with clock - wrote 10 twice, and began to put hour hand at 10, however corrected self for both).  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  Pt and family were counseled to consider neurological follow up by next month if no further spontaneous improvement in cognition.    Pt was visible on unit, social with peers, attended groups and individual sessions with psychology, and participated well.  She was emotionally regulated and maintained good behavioral control.  Pt was compliant with medications and basic care, and attended to her ADLs independently.  Pt was future oriented and consistently denied suicidality and homicidality.  Pt submitted 3 day letter.   Treatment team did not have clinical indication to seek court order for involuntary retention, and pt was discharged home on 3 day letter.  Prior to discharge, pt stated she had contacted her NP at [facility] and secured herself a virtual follow up appointment on 9/8/2023 at 1PM.

## 2023-09-07 NOTE — BH DISCHARGE NOTE NURSING/SOCIAL WORK/PSYCH REHAB - NSDCPRGOAL_PSY_ALL_CORE
Patient is being discharged via 3 day letter. Upon approach, patient was willing to meet with writer to discuss recovery progress. Patient was calm throughout meeting. When asked about plans for after discharge, patient stated she's looking forward to returning home and spending time with her family. Patient was knowledgeable on outpatient treatment and stated her intentions to follow it. When asked to compare how she felt today to how she felt when first admitted, patient stated that she feels ready to return home. Overall, patient was cooperative and feels optimistic about recovery. Patient denies AH/VH/SI/HI.   During hospitalization, patient was intermittently active in the milieu and attended many groups. In groups patient was meaningfully engaged. Patient is kempt and has been seen dressed casually. Patient was compliant in safety planning and was provided with a Press-[employer] survey. By the end of hospitalization, patient did meet her goal of being able to identify the early signs of montserrat (i.e. sleep and mood changes) and employ coping strategies to minimize acting out.  Patient has also demonstrated better insight and judgement, as well as future-oriented thinking.
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
## 2023-09-07 NOTE — BH INPATIENT PSYCHIATRY DISCHARGE NOTE - NSBHFUPINTERVALCCFT_PSY_A_CORE
Discharge Progress Note Date and Time: 09-07-23 @ 12:00    Seen for follow up of mood and cognitive symptoms.

## 2023-09-07 NOTE — BH PSYCHOLOGY - GROUP THERAPY NOTE - TOKEN PULL-DIAGNOSIS
Primary Diagnosis:  Bipolar I disorder [F31.9]        Problem Dx:   
Primary Diagnosis:  Bipolar I disorder [F31.9]        Problem Dx:

## 2023-09-07 NOTE — BH INPATIENT PSYCHIATRY PROGRESS NOTE - CURRENT MEDICATION
MEDICATIONS  (STANDING):  divalproex ER 1000 milliGRAM(s) Oral at bedtime  influenza   Vaccine 0.5 milliLiter(s) IntraMuscular once  melatonin. 3 milliGRAM(s) Oral at bedtime    MEDICATIONS  (PRN):  diphenhydrAMINE 25 milliGRAM(s) Oral every 4 hours PRN eps/agitation/insomnia  diphenhydrAMINE Injectable 50 milliGRAM(s) IntraMuscular once PRN eps/agitation  haloperidol     Tablet 2 milliGRAM(s) Oral every 4 hours PRN agitation  haloperidol    Injectable 5 milliGRAM(s) IntraMuscular once PRN agitation  LORazepam     Tablet 1 milliGRAM(s) Oral every 4 hours PRN agitation  LORazepam   Injectable 2 milliGRAM(s) IntraMuscular once PRN agitation  nicotine  Polacrilex Gum 2 milliGRAM(s) Oral every 2 hours PRN nicotine use disorder  
MEDICATIONS  (STANDING):  divalproex ER 1000 milliGRAM(s) Oral at bedtime  influenza   Vaccine 0.5 milliLiter(s) IntraMuscular once  OLANZapine 5 milliGRAM(s) Oral at bedtime    MEDICATIONS  (PRN):  diphenhydrAMINE 25 milliGRAM(s) Oral every 4 hours PRN eps/agitation/insomnia  diphenhydrAMINE Injectable 50 milliGRAM(s) IntraMuscular once PRN eps/agitation  haloperidol     Tablet 2 milliGRAM(s) Oral every 4 hours PRN agitation  haloperidol    Injectable 5 milliGRAM(s) IntraMuscular once PRN agitation  LORazepam     Tablet 1 milliGRAM(s) Oral every 4 hours PRN agitation  LORazepam   Injectable 2 milliGRAM(s) IntraMuscular once PRN agitation  nicotine  Polacrilex Gum 2 milliGRAM(s) Oral every 2 hours PRN nicotine use disorder  
MEDICATIONS  (STANDING):  divalproex ER 1000 milliGRAM(s) Oral at bedtime  influenza   Vaccine 0.5 milliLiter(s) IntraMuscular once    MEDICATIONS  (PRN):  diphenhydrAMINE 25 milliGRAM(s) Oral every 4 hours PRN eps/agitation/insomnia  diphenhydrAMINE Injectable 50 milliGRAM(s) IntraMuscular once PRN eps/agitation  haloperidol     Tablet 2 milliGRAM(s) Oral every 4 hours PRN agitation  haloperidol    Injectable 5 milliGRAM(s) IntraMuscular once PRN agitation  LORazepam     Tablet 1 milliGRAM(s) Oral every 4 hours PRN agitation  LORazepam   Injectable 2 milliGRAM(s) IntraMuscular once PRN agitation  nicotine  Polacrilex Gum 2 milliGRAM(s) Oral every 2 hours PRN nicotine use disorder  
MEDICATIONS  (STANDING):  divalproex ER 1000 milliGRAM(s) Oral at bedtime  influenza   Vaccine 0.5 milliLiter(s) IntraMuscular once  melatonin. 3 milliGRAM(s) Oral at bedtime    MEDICATIONS  (PRN):  diphenhydrAMINE 25 milliGRAM(s) Oral every 4 hours PRN eps/agitation/insomnia  diphenhydrAMINE Injectable 50 milliGRAM(s) IntraMuscular once PRN eps/agitation  haloperidol     Tablet 2 milliGRAM(s) Oral every 4 hours PRN agitation  haloperidol    Injectable 5 milliGRAM(s) IntraMuscular once PRN agitation  LORazepam     Tablet 1 milliGRAM(s) Oral every 4 hours PRN agitation  LORazepam   Injectable 2 milliGRAM(s) IntraMuscular once PRN agitation  nicotine  Polacrilex Gum 2 milliGRAM(s) Oral every 2 hours PRN nicotine use disorder  

## 2023-09-07 NOTE — BH PSYCHOLOGY - GROUP THERAPY NOTE - NSBHPSYCHOLRESPCOMMENT_PSY_A_CORE FT
The patient appeared adequately groomed and casually dressed. Pt appeared engaged in the group as evidenced by their willingness to independently verbally communicate during the discussion. Pt talked about her experience with mental health stigma, as well as the importance of her family as a support system. Pt was oriented X3. Pt was appropriate with peers.

## 2023-09-07 NOTE — BH PSYCHOLOGY - CLINICIAN PSYCHOTHERAPY NOTE - NSBHPSYCHOLINT_PSY_A_CORE
Cognitive/behavioral therapy/Dialectical  Behavioral Therapy (DBT)/Dynamic issues addressed/Reality testing/Supported coping skills/Supportive therapy/Treatment compliance encouraged

## 2023-09-07 NOTE — BH INPATIENT PSYCHIATRY DISCHARGE NOTE - NSBHMETABOLIC_PSY_ALL_CORE_FT
BMI: BMI (kg/m2): 28.3 (09-01-23 @ 15:40)  HbA1c: A1C with Estimated Average Glucose Result: 5.5 % (08-25-23 @ 13:32)    Glucose: POCT Blood Glucose.: 189 mg/dL (07-25-23 @ 15:52)    BP: --  Lipid Panel: Date/Time: 05-26-23 @ 13:30  Cholesterol, Serum: 159  Direct LDL: --  HDL Cholesterol, Serum: 33  Total Cholesterol/HDL Ration Measurement: --  Triglycerides, Serum: 197

## 2023-09-07 NOTE — BH INPATIENT PSYCHIATRY DISCHARGE NOTE - OTHER PAST PSYCHIATRIC HISTORY (INCLUDE DETAILS REGARDING ONSET, COURSE OF ILLNESS, INPATIENT/OUTPATIENT TREATMENT)
58F w/bipolar I disorder, 1 prior admission, no hx of SA or violence, admitted with mood symptoms/psychosis in context of recently stopping lithium due to toxicity.   Bipolar I disorder  4 prior admissions, last at [facility] in 2016, since that time was stable on haloperidol and lithium.  No hx of suicide attempts.  No hx of aggression.    Outpatient care at [facility] with NP Ester Castorena.

## 2023-09-07 NOTE — BH INPATIENT PSYCHIATRY DISCHARGE NOTE - NSDCCPCAREPLAN_GEN_ALL_CORE_FT
PRINCIPAL DISCHARGE DIAGNOSIS  Diagnosis: Bipolar I disorder  Assessment and Plan of Treatment: Medication management and psychotherapy

## 2023-09-07 NOTE — BH INPATIENT PSYCHIATRY DISCHARGE NOTE - MSE UNSTRUCTURED FT
Appearance: Dressed appropriately.  Good hygiene and grooming.   Behavior: Cooperative.  Calm.  Good eye contact.  Well related.   Motor: No abnormal movements, no psychomotor slowing or activation.  Speech: Regular rate.  Mood: "Calm."  Affect: Pleasant, full range, stable, appropriate to context of conversation.   Thought Process: Linear.  Associations: Fair.  Thought Content:  Future oriented.  Denies AVH.  Denies SIIP or HIIP.  Sometimes has difficulty conveying complex thoughts.  Insight: Improved.  Judgment: Fair on interview.  Gait: Intact.   Attention: Fair.  Language: At times has word finding difficulties.  Oriented to date, month, year, location.  Able to identify current POTUS, unable to recall VPOTUS (even with cues), identifies last POTUS but only with cues.  Able to register 3/3 words.  Delayed recall - remembers first word without cues, does not recall second word even with cues, remembers third word only after remembering first word.  Clock drawing - contour oddly shaped, initially writes 10 twice but corrects self.  When asked to draw ten past eleven, initially starts to put hour hand at ten but then corrects self.

## 2023-09-07 NOTE — BH DISCHARGE NOTE NURSING/SOCIAL WORK/PSYCH REHAB - PATIENT PORTAL LINK FT
You can access the FollowMyHealth Patient Portal offered by [facility] by registering at the following website: [email or web address]. By joining [facility]’s FollowMyHealth portal, you will also be able to view your health information using other applications (apps) compatible with our system.

## 2023-09-07 NOTE — BH PSYCHOLOGY - GROUP THERAPY NOTE - NSPSYCHOLGRPCOGGOAL_PSY_A_CORE FT
Decrease symptoms, Develop coping/emotion regulation skills, Psychoeducation 
Decrease symptoms, Develop coping/emotion regulation skills, Psychoeducation

## 2023-09-07 NOTE — BH PSYCHOLOGY - GROUP THERAPY NOTE - NSPSYCHOLGRPCOGINT_PSY_A_CORE FT
Cognitive/behavioral therapy, Acceptance and Commitment therapy, Emotion regulation/coping skills taught, Psychoed 
 Cognitive/behavioral therapy, Acceptance and Commitment therapy, Emotion regulation/coping skills taught, Psychoed

## 2023-09-07 NOTE — BH INPATIENT PSYCHIATRY DISCHARGE NOTE - NSBHFUPINTERVALHXFT_PSY_A_CORE
No overnight events.  Pt today states she would like to go home.  Pt acknowledges that she has difficulty finding the right words and her short term memory is not good, understands this could be part of her recent lithium toxicity.  Pt agrees that she would be unlikely to go back to work, but states that she feels she could still manage at home with family support.  Pt states she has been writing important things down in her notebook to help herself remember things.  Pt states that she feels her long term memory is fine, reflects on her children, grandchildren, being a parent while returning to school for GED, and past romantic relationships.

## 2023-09-07 NOTE — BH INPATIENT PSYCHIATRY PROGRESS NOTE - NSBHCHARTREVIEWVS_PSY_A_CORE FT
Vital Signs Last 24 Hrs  T(C): 36.6 (09-03-23 @ 07:52), Max: 36.7 (09-02-23 @ 20:57)  T(F): 97.8 (09-03-23 @ 07:52), Max: 98 (09-02-23 @ 20:57)  HR: --  BP: --  BP(mean): --  RR: --  SpO2: --    Orthostatic VS  09-03-23 @ 07:52  Lying BP: --/-- HR: --  Sitting BP: 130/63 HR: 97  Standing BP: 136/73 HR: 106  Site: --  Mode: --  Orthostatic VS  09-02-23 @ 20:57  Lying BP: --/-- HR: --  Sitting BP: 125/66 HR: 97  Standing BP: 138/72 HR: 105  Site: --  Mode: --  Orthostatic VS  09-02-23 @ 07:55  Lying BP: --/-- HR: --  Sitting BP: 141/81 HR: 96  Standing BP: 129/78 HR: 99  Site: --  Mode: --  Orthostatic VS  09-01-23 @ 15:40  Lying BP: --/-- HR: --  Sitting BP: 152/83 HR: 103  Standing BP: 150/82 HR: 106  Site: --  Mode: --  
Vital Signs Last 24 Hrs  T(C): 36.9 (09-06-23 @ 08:35), Max: 36.9 (09-06-23 @ 08:35)  T(F): 98.5 (09-06-23 @ 08:35), Max: 98.5 (09-06-23 @ 08:35)  HR: --  BP: --  BP(mean): --  RR: --  SpO2: --    Orthostatic VS  09-06-23 @ 08:35  Lying BP: --/-- HR: --  Sitting BP: 119/76 HR: 90  Standing BP: 120/74 HR: 105  Site: --  Mode: --  Orthostatic VS  09-05-23 @ 08:34  Lying BP: --/-- HR: --  Sitting BP: 138/73 HR: 83  Standing BP: 138/82 HR: 88  Site: --  Mode: --  
Vital Signs Last 24 Hrs  T(C): 36.7 (09-02-23 @ 07:55), Max: 37 (09-01-23 @ 15:40)  T(F): 98 (09-02-23 @ 07:55), Max: 98.6 (09-01-23 @ 15:40)  HR: --  BP: --  BP(mean): --  RR: --  SpO2: 100% (09-01-23 @ 15:40) (100% - 100%)    Orthostatic VS  09-02-23 @ 07:55  Lying BP: --/-- HR: --  Sitting BP: 141/81 HR: 96  Standing BP: 129/78 HR: 99  Site: --  Mode: --  Orthostatic VS  09-01-23 @ 15:40  Lying BP: --/-- HR: --  Sitting BP: 152/83 HR: 103  Standing BP: 150/82 HR: 106  Site: --  Mode: --  
Vital Signs Last 24 Hrs  T(C): 37.1 (09-07-23 @ 07:57), Max: 37.1 (09-07-23 @ 07:57)  T(F): 98.8 (09-07-23 @ 07:57), Max: 98.8 (09-07-23 @ 07:57)  HR: --  BP: --  BP(mean): --  RR: --  SpO2: --    Orthostatic VS  09-07-23 @ 07:57  Lying BP: --/-- HR: --  Sitting BP: 143/73 HR: 90  Standing BP: 136/78 HR: 97  Site: --  Mode: --  Orthostatic VS  09-06-23 @ 08:35  Lying BP: --/-- HR: --  Sitting BP: 119/76 HR: 90  Standing BP: 120/74 HR: 105  Site: --  Mode: --  
Vital Signs Last 24 Hrs  T(C): 36.4 (09-05-23 @ 08:34), Max: 36.4 (09-05-23 @ 08:34)  T(F): 97.6 (09-05-23 @ 08:34), Max: 97.6 (09-05-23 @ 08:34)  HR: --  BP: --  BP(mean): --  RR: --  SpO2: --    Orthostatic VS  09-05-23 @ 08:34  Lying BP: --/-- HR: --  Sitting BP: 138/73 HR: 83  Standing BP: 138/82 HR: 88  Site: --  Mode: --  Orthostatic VS  09-04-23 @ 08:16  Lying BP: --/-- HR: --  Sitting BP: 136/77 HR: 96  Standing BP: 130/82 HR: 101  Site: --  Mode: --  
Vital Signs Last 24 Hrs  T(C): 36.8 (09-04-23 @ 08:16), Max: 36.8 (09-04-23 @ 08:16)  T(F): 98.3 (09-04-23 @ 08:16), Max: 98.3 (09-04-23 @ 08:16)  HR: --  BP: --  BP(mean): --  RR: --  SpO2: --    Orthostatic VS  09-04-23 @ 08:16  Lying BP: --/-- HR: --  Sitting BP: 136/77 HR: 96  Standing BP: 130/82 HR: 101  Site: --  Mode: --  Orthostatic VS  09-03-23 @ 07:52  Lying BP: --/-- HR: --  Sitting BP: 130/63 HR: 97  Standing BP: 136/73 HR: 106  Site: --  Mode: --  Orthostatic VS  09-02-23 @ 20:57  Lying BP: --/-- HR: --  Sitting BP: 125/66 HR: 97  Standing BP: 138/72 HR: 105  Site: --  Mode: --

## 2023-09-07 NOTE — BH INPATIENT PSYCHIATRY PROGRESS NOTE - NSTXMANICGOAL_PSY_ALL_CORE
Demonstrate a substantial reduction in both hyperactive pacing on the unit and social intrusiveness
Be able to identify the early signs of montserrat (e.g. sleep and mood changes) and to employ coping strategies to minimize acting out

## 2023-09-07 NOTE — BH INPATIENT PSYCHIATRY DISCHARGE NOTE - NSDCMRMEDTOKEN_GEN_ALL_CORE_FT
divalproex sodium 500 mg oral tablet, extended release: 2 tab(s) orally once a day (at bedtime)  melatonin 3 mg oral tablet: 1 tab(s) orally once a day (at bedtime)

## 2023-09-07 NOTE — BH PSYCHOLOGY - GROUP THERAPY NOTE - NSPSYCHOLGRPCOGPT_PSY_A_CORE FT
Patient attended Cognitive Behavioral Therapy Group incorporating ACT-based concepts. The group started with a brief check-in asking Pts about what pet they have/had or want to have, as well as reflecting on a quote related to resistance vs acceptance. The group was then engaged in a worksheet discussing mental health stigma reflecting on their own thoughts, feelings and experiences. Group facilitator explained concepts, reinforced participation, and engaged patients in the discussion. 
Patient attended Cognitive Behavioral Therapy Group incorporating ACT-based concepts. The group started with a brief check-in asking Pts about something they have not done/tried yet but want to. Group then discussed a quote regarding thoughts and thought defusion. The group was then engaged in a worksheet discussing PLEASE skill and the importance of prioritizing one's physical health, healthy eating habits, substance use, healthy sleep as well as exercise reflecting on their own thoughts, feelings and experiences. Group facilitator explained concepts, reinforced participation, and engaged patients in the discussion.

## 2023-09-07 NOTE — BH INPATIENT PSYCHIATRY DISCHARGE NOTE - NSDCRECOMMENDMEDICALFT_PSY_ALL_CORE
Please contact your nurse practitioner at [facility] for an appointment.  Please consider seeing a neurologist if your cognitive symptoms do not improve by next month.

## 2023-09-07 NOTE — BH PSYCHOLOGY - GROUP THERAPY NOTE - NSPSYCHOLGRPCOGPROB_PSY_A_CORE FT
Anxiety, Depression, Emotion dysregulation, Lack of coping skills 
Anxiety, Depression, Emotion dysregulation, Lack of coping skills

## 2023-09-07 NOTE — BH INPATIENT PSYCHIATRY PROGRESS NOTE - NSICDXBHPRIMARYDX_PSY_ALL_CORE
Bipolar I disorder   F31.9  

## 2023-09-07 NOTE — BH INPATIENT PSYCHIATRY DISCHARGE NOTE - NSBHASSESSSUMMFT_PSY_ALL_CORE
Pt at this time still does not exhibit any clear manic or depressive episode.  No overt psychotic symptoms noted.  Pt however continues to have cognitive issues, may be related to recent lithium toxicity, has not reached time requirement for SILENT but concern remains.  Pt submitted 3 day letter.  Pt does not meet retention criteria at this time.  Pt's daughter Rozina was notified and updated.  Teleconference held this afternoon with team, pt, and pt's daughter Rozina.  Pt insisting upon leaving today, despite recommendation and encouragement from team and daughter to retract 3DL and remain until Monday for monitoring and discharge planning.  Pt understands that team does not have time to arrange for outpatient psychiatric appointment.  Pt's daughter indicated she will be coming this evening during visiting hours to  pt.  Writer instructed pt and family to contact outpatient psychiatric NP for appointment.  Writer contacted outpatient psychiatric NP and provided update.  Prior to discharge, pt called her NP from unit and reported that she secured an appointment for tomorrow 9/8/23 at 1PM.    Risk assessment on discharge: Pt has chronic risk factors of bipolar I disorder, 4 prior psychiatric admissions, with acute risk factors of recent mood and cognitive symptoms, now treated with inpatient care consisting of medication management and psychotherapeutic interventions.  Protective factors of supportive family, employment, has boyfriend, has children and grandchildren, future orientation, therapeutic alliances, stable housing.  Pt submitted 3 day letter.   Pt has consistently denied suicidality and homicidality, is emotionally regulated and in good behavioral control, and is caring for ADLs independently.  While pt is chronic risk of harm, pt is NOT an acute or imminent risk of harm to self or others.  Therefore treatment team has NO clinical indication to seek court order for involuntary retention.  Pt will be discharged on 3 day letter.     1. Will d/c home on current regimen.  2 week supply of Depakote ER and melatonin provided to pt.  2. Psychoeducation provided regarding importance of compliance with outpatient appointments and medications.  3. Pt was advised to remain abstinent with substances.  4. Pt was advised to dial 911 or return to ER if they become danger to self or others.  Pt at this time still does not exhibit any clear manic or depressive episode.  No overt psychotic symptoms noted.  Therefore bipolar I disorder appears stable.  Pt however continues to have cognitive issues, may be related to recent lithium toxicity, has not reached time requirement for SILENT but concern remains.  Pt submitted 3 day letter.  Pt does not meet retention criteria at this time.  Pt's daughter Rozina was notified and updated.  Teleconference held this afternoon with team, pt, and pt's daughter Rozina.  Pt insisting upon leaving today, despite recommendation and encouragement from team and daughter to retract 3DL and remain until Monday for monitoring and discharge planning.  Pt understands that team does not have time to arrange for outpatient psychiatric appointment.  Pt's daughter indicated she will be coming this evening during visiting hours to  pt.  Writer instructed pt and family to contact outpatient psychiatric NP for appointment.  Writer contacted outpatient psychiatric NP and provided update.  Prior to discharge, pt called her NP from unit and reported that she secured an appointment for tomorrow 9/8/23 at 1PM.    Risk assessment on discharge: Pt has chronic risk factors of bipolar I disorder, 4 prior psychiatric admissions, with acute risk factors of recent mood and cognitive symptoms, now treated with inpatient care consisting of medication management and psychotherapeutic interventions.  Protective factors of supportive family, employment, has boyfriend, has children and grandchildren, future orientation, therapeutic alliances, stable housing.  Pt submitted 3 day letter.   Pt has consistently denied suicidality and homicidality, is emotionally regulated and in good behavioral control, and is caring for ADLs independently.  While pt is chronic risk of harm, pt is NOT an acute or imminent risk of harm to self or others.  Therefore treatment team has NO clinical indication to seek court order for involuntary retention.  Pt will be discharged on 3 day letter.     1. Will d/c home on current regimen.  2 week supply of Depakote ER and melatonin provided to pt.  2. Psychoeducation provided regarding importance of compliance with outpatient appointments and medications.  3. Pt was advised to remain abstinent with substances.  4. Pt was advised to dial 911 or return to ER if they become danger to self or others.  Pt at this time still does not exhibit any clear manic or depressive episode.  No overt psychotic symptoms noted.  Therefore bipolar I disorder appears stable.  Pt however continues to have cognitive issues, may be related to recent lithium toxicity, has not reached time requirement for SILENT but concern remains.  Pt submitted 3 day letter.  Pt does not meet involuntary retention criteria at this time.  Pt's daughter Rozina was notified and updated.  Teleconference held this afternoon with team, pt, and pt's daughter Rozina.  Pt insisting upon leaving today, despite recommendation and encouragement from team and daughter to retract 3DL and remain until Monday for monitoring and discharge planning.  Pt understands that team does not have time to arrange for outpatient psychiatric appointment.  Pt's daughter indicated she will be coming this evening during visiting hours to  pt.  Writer instructed pt and family to contact outpatient psychiatric NP for appointment.  Writer contacted outpatient psychiatric NP and provided update.  Prior to discharge, pt called her NP from unit and reported that she secured an appointment for tomorrow 9/8/23 at 1PM.    Risk assessment on discharge: Pt has chronic risk factors of bipolar I disorder, 4 prior psychiatric admissions, with acute risk factors of recent mood and cognitive symptoms, now treated with inpatient care consisting of medication management and psychotherapeutic interventions.  Protective factors of supportive family, employment, has boyfriend, has children and grandchildren, future orientation, therapeutic alliances, stable housing.  Pt submitted 3 day letter.   Pt has consistently denied suicidality and homicidality, is emotionally regulated and in good behavioral control, and is caring for ADLs independently.  While pt is chronic risk of harm, pt is NOT an acute or imminent risk of harm to self or others.  Therefore treatment team has NO clinical indication to seek court order for involuntary retention.  Pt will be discharged on 3 day letter.     1. Will d/c home on current regimen.  2 week supply of Depakote ER and melatonin provided to pt.  2. Psychoeducation provided regarding importance of compliance with outpatient appointments and medications.  3. Pt was advised to remain abstinent with substances.  4. Pt was advised to dial 911 or return to ER if they become danger to self or others.  Pt at this time still does not exhibit any clear manic or depressive episode.  No overt psychotic symptoms noted.  Therefore bipolar I disorder appears stable.  Pt however continues to have cognitive issues, may be related to recent lithium toxicity, has not reached time requirement for Syndrome of Irreversible Lithium Effectuated Neurotoxicity (SILENT) but concern remains.  Pt submitted 3 day letter.  Pt does not meet involuntary retention criteria at this time.  Pt's daughter Rozina was notified and updated.  Teleconference held this afternoon with team, pt, and pt's daughter Rozina.  Pt insisting upon leaving today, despite recommendation and encouragement from team and daughter to retract 3DL and remain until Monday for monitoring and discharge planning.  Pt understands that team does not have time to arrange for outpatient psychiatric appointment.  Pt's daughter indicated she will be coming this evening during visiting hours to  pt.  Writer instructed pt and family to contact outpatient psychiatric NP for appointment.  Writer contacted outpatient psychiatric NP and provided update.  Prior to discharge, pt called her NP from unit and reported that she secured an appointment for tomorrow 9/8/23 at 1PM.    Risk assessment on discharge: Pt has chronic risk factors of bipolar I disorder, 4 prior psychiatric admissions, with acute risk factors of recent mood and cognitive symptoms, now treated with inpatient care consisting of medication management and psychotherapeutic interventions.  Protective factors of supportive family, employment, has boyfriend, has children and grandchildren, future orientation, therapeutic alliances, stable housing.  Pt submitted 3 day letter.   Pt has consistently denied suicidality and homicidality, is emotionally regulated and in good behavioral control, and is caring for ADLs independently.  While pt is chronic risk of harm, pt is NOT an acute or imminent risk of harm to self or others.  Therefore treatment team has NO clinical indication to seek court order for involuntary retention.  Pt will be discharged on 3 day letter.     1. Will d/c home on current regimen.  2 week supply of Depakote ER and melatonin provided to pt.  2. Psychoeducation provided regarding importance of compliance with outpatient appointments and medications.  3. Pt was advised to remain abstinent with substances.  4. Pt was advised to dial 911 or return to ER if they become danger to self or others.

## 2023-09-07 NOTE — BH INPATIENT PSYCHIATRY DISCHARGE NOTE - HPI (INCLUDE ILLNESS QUALITY, SEVERITY, DURATION, TIMING, CONTEXT, MODIFYING FACTORS, ASSOCIATED SIGNS AND SYMPTOMS)
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      Received handoff from outpatient provider BLAYNE Castorena: Pt has been stable since last admission on lithium 600 mg and haloperidol 2.5 mg daily, however 1 month ago had lithium toxicology requiring hemodialysis.  Since then pt was started on olanzapine, titrated to 20 mg qhs, with oversedation, now currently at 15 mg qhs.  Pt was started on Depakote ER on 8/24/2023, titrated to 1000 mg qhs with serum level of 102 yesterday.  Pt remains manic with psychomotor activation, decreased need for sleep, racing thoughts, and noncommand auditory hallucinations.  Pt with declining functioning, unknown if having any missed doses/noncompliance with meds as a result.    Review of recent [facility] documentation shows that pt presented to [facility] ER on 7/26/2023 with 3 days of lethargy, nausea, diarrhea.  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  Pt was discharged home 8/4/2023 on haloperidol 5 mg TID and HCTZ 12.5 mg daily.    Review of [facility] documentation:  8/8 E&M Note: Pt reporting oversedation, but daughter stating pt was back at baseline.  Haloperidol decreased from 5 mg TID to 5 mg AM + 7.5 mg QHS.  8/15 E&M Note: Pt presented with inability to sleep, akathisia, racing thoughts, disorganization. Haloperidol was discontinued, and olanzapine 10 mg qhs was started with instructions to increase to 20 mg if still having inability to sleep.  8/22 E&M Note: Pt reporting improved sleep 7-8 hrs but still with increased energy, increased goal directed activity, and pressured speech, AH, and referential thinking (TV/radio talking to her).  Olanzapine was increased to 20 mg qhs.  Depakote  mg BID.  Benztropine was discontinued.  No longer taking HCTZ at this point.    8/29 E&M Note: Pt has been sleeping less on Depakote, increase in daytime sleepiness, with side effects from likely Zyprexa, falls/forgetfulness, slurred speech due to dry mouth.  Pt still reporting increased energy and goal directed behavior.  Olanzapine decreased to 15 mg qhs.  Depakote ER consolidated to 1000 mg qhs.    Pt on interview states that she does not remember much from [facility] admission, states she was told she had lithium toxicity and that she needed hemodialysis.  Pt states recently she has not been able to sleep well, sometimes sleeps in daytime, sometimes cannot sleep overnight.  She states others have told her she is talking too much or too fast.  She denies feeling overly happy or angry.  Pt also feels she is having increased activity and energy, states she has been cleaning non stop.  She denies any excessive spending, inappropriate sexual encounters, or any other abnormal behaviors.  She reports hearing voices, sometimes male, sometimes female, unrecognizable, and she cannot make out the content.  She also feels at times that TV is talking to her or about her and she will turn off TV.  She denies any past or recent thoughts of harming self or others.  Pt states she has a hard time remembering to take medications, does not remember the names but does remember the doses, but cannot say with certainty that she has been taking them regularly, though she thinks she may not have taken any today.   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  Pt is direct voluntary admission from [facility], sent by BLAYNE Castorena, for concerns of possible mood episode in context of recent lithium toxicity that required [facility] admission (7/26/2023-8/4/2023) and hemodialysis.    Pt on interview states that she does not remember much from [facility] admission, states she was told she had lithium toxicity and that she needed hemodialysis.  Pt states recently she has not been able to sleep well, sometimes sleeps in daytime, sometimes cannot sleep overnight.  She states others have told her she is talking too much or too fast.  She denies feeling overly happy or angry.  Pt also feels she is having increased activity and energy, states she has been cleaning non stop.  She denies any excessive spending, inappropriate sexual encounters, or any other abnormal behaviors.  She reports hearing voices, sometimes male, sometimes female, unrecognizable, and she cannot make out the content.  She also feels at times that TV is talking to her or about her and she will turn off TV.  She denies any past or recent thoughts of harming self or others.  Pt states she has a hard time remembering to take medications, does not remember the names but does remember the doses, but cannot say with certainty that she has been taking them regularly, though she thinks she may not have taken any today.      Received handoff from outpatient provider BLAYNE Castorena: Pt has been stable since last admission on lithium 600 mg and haloperidol 2.5 mg daily, however 1 month ago had lithium toxicology requiring hemodialysis.  Since then pt was started on olanzapine, titrated to 20 mg qhs, with oversedation, now currently at 15 mg qhs.  Pt was started on Depakote ER on 8/24/2023, titrated to 1000 mg qhs with serum level of 102 yesterday.  Pt remains manic with psychomotor activation, decreased need for sleep, racing thoughts, and non-command auditory hallucinations.  Pt with declining functioning, unknown if having any missed doses/noncompliance with meds as a result.    Review of recent [facility] documentation shows that pt presented to [facility] ER on 7/26/2023 with 3 days of lethargy, nausea, diarrhea.  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  Pt was discharged home 8/4/2023 on haloperidol 5 mg TID and HCTZ 12.5 mg daily.    Review of [facility] documentation:  8/8 E&M Note: Pt reporting oversedation, but daughter stating pt was back at baseline.  Haloperidol decreased from 5 mg TID to 5 mg AM + 7.5 mg QHS.  8/15 E&M Note: Pt presented with inability to sleep, akathisia, racing thoughts, disorganization. Haloperidol was discontinued, and olanzapine 10 mg qhs was started with instructions to increase to 20 mg if still having inability to sleep.  8/22 E&M Note: Pt reporting improved sleep 7-8 hrs but still with increased energy, increased goal directed activity, and pressured speech, AH, and referential thinking (TV/radio talking to her).  Olanzapine was increased to 20 mg qhs.  Depakote  mg BID.  Benztropine was discontinued.  No longer taking HCTZ at this point.    8/29 E&M Note: Pt has been sleeping less on Depakote, increase in daytime sleepiness, with side effects from likely Zyprexa, falls/forgetfulness, slurred speech due to dry mouth.  Pt still reporting increased energy and goal directed behavior.  Olanzapine decreased to 15 mg qhs.  Depakote ER consolidated to 1000 mg qhs.

## 2023-09-07 NOTE — BH INPATIENT PSYCHIATRY PROGRESS NOTE - NSBHASSESSSUMMFT_PSY_ALL_CORE
58F w/bipolar I disorder, 1 prior admission, no hx of SA or violence, admitted with mood symptoms and cognitive slowing in context of recently stopping lithium due to toxicity.  Unclear if truly manic given no overt euphoria or irritability; pt with some mood lability and overproduction of speech but consistently calm mood and intact sleep. Pt with some cognitive slowing, with evident impairment to recent memory as well as executive function; extent, etiology, and prognosis of these sx unclear.  Ddx includes Syndrome of Irreversible Lithium Effectuated Neurotoxicity (pt has not met time criteria of persistent sx for >2 mo), other sequelae of lithium toxicity, and anticholinergic burden (pt recently on benztropine and olanzapine, with resulting sedation; dc while on unit). Given pt's absence of manic sx or other psychiatric sx at this time, with primary sx suspected to be of neurological origin, dc with outpatient psychiatric as well as neurological followup should be considered at this time.      Plan  1. Depakote ER restarted and titrated to 1000mg qhs (unclear if compliant at home).  Level this AM 99.   2. Tapered and discontinued olanzapine to minimize anticholinergic burden.   3. Dispo: Pt appears stable throughout stay, with no sign of overt montserrat or depression. Patient suitable for dc pending family's approval.

## 2023-09-07 NOTE — BH INPATIENT PSYCHIATRY PROGRESS NOTE - MSE UNSTRUCTURED FT
Appearance: Dressed appropriately.  Behavior: Calm, cooperative.   Motor: No abnormal movements, no psychomotor slowing or activation.  Speech: somewhat overproductive today; difficulty finding the correct word at times.   Mood: "calm"  Affect: Laughing using humor appropriately, but then becomes tearful, mild lability.   Thought Process: Vague, contradicting at times  Associations: At times somewhat loose  Thought Content: Difficulty clearly formulating thoughts.  Denies SIIP or HIIP.  Insight: Limited.  Judgment: Fair on interview.  Attention: Fair.  Language: Some word finding difficulty  Gait: Intact.   Cognition: Able to recall details from distant past, including schooling and extended family. Pt answers date and current president easily; unable to recall current , and recalls past president with difficulty. Able to fill out numbers and hands of a clock with difficulty. Able to recall 2/3 words (one with prompting); could not identify the remaining word when given an option of several words.

## 2023-09-07 NOTE — BH INPATIENT PSYCHIATRY PROGRESS NOTE - NSBHFUPINTERVALHXFT_PSY_A_CORE
Pt says that she is doing well on the unit and slept well last night. Pt interested in going home; says that her mood has been stable and that she wants to be at home to resume her normal routine. Pt frustrated by her time on unit, feels that she is not as sick as most of the other patients. Pt still endorses a sense of cognitive haziness, but states that she understands that this may be a more long term symptom as part of the sequelae of lithium toxicity. She confirms ability to dress herself, feed herself, and go to the bathroom by herself, but is unsure if she would be able to return to work. Pt denies SIIP/HIIP as well as AVH.

## 2023-09-07 NOTE — BH DISCHARGE NOTE NURSING/SOCIAL WORK/PSYCH REHAB - NSCDUDCCRISIS_PSY_A_CORE
[facility]  1 [phone]) [facility]-WELL ([phone])  Text "WELL" to 35553  Website: www.[employer]/.[employer] 1 (795) 621-AKPN (3088) Website: www.safehorizon.org/.National Suicide Prevention Lifeline 6 (804) 767-4086/.  [employer]  1 (039) LIFENET (167-7413)/.  [facility]  [phone] [place] [phone]   Hours:  Monday through Friday from 9 AM to 3 PM/988 Suicide and Crisis Lifeline

## 2023-09-13 ENCOUNTER — INPATIENT (INPATIENT)
Facility: HOSPITAL | Age: 58
LOS: 40 days | Discharge: HOME | End: 2023-10-24
Attending: PSYCHIATRY & NEUROLOGY | Admitting: PSYCHIATRY & NEUROLOGY
Payer: MEDICAID

## 2023-09-13 VITALS
DIASTOLIC BLOOD PRESSURE: 81 MMHG | HEIGHT: 67.5 IN | TEMPERATURE: 98 F | HEART RATE: 84 BPM | SYSTOLIC BLOOD PRESSURE: 143 MMHG | OXYGEN SATURATION: 99 % | RESPIRATION RATE: 16 BRPM

## 2023-09-13 DIAGNOSIS — F31.9 BIPOLAR DISORDER, UNSPECIFIED: ICD-10-CM

## 2023-09-13 LAB
ALBUMIN SERPL ELPH-MCNC: 4.3 G/DL — SIGNIFICANT CHANGE UP (ref 3.3–5)
ALP SERPL-CCNC: 63 U/L — SIGNIFICANT CHANGE UP (ref 40–120)
ALT FLD-CCNC: 17 U/L — SIGNIFICANT CHANGE UP (ref 4–33)
AMPHET UR-MCNC: NEGATIVE — SIGNIFICANT CHANGE UP
ANION GAP SERPL CALC-SCNC: 12 MMOL/L — SIGNIFICANT CHANGE UP (ref 7–14)
APAP SERPL-MCNC: <10 UG/ML — LOW (ref 15–25)
AST SERPL-CCNC: 26 U/L — SIGNIFICANT CHANGE UP (ref 4–32)
BARBITURATES UR SCN-MCNC: NEGATIVE — SIGNIFICANT CHANGE UP
BASOPHILS # BLD AUTO: 0.04 K/UL — SIGNIFICANT CHANGE UP (ref 0–0.2)
BASOPHILS NFR BLD AUTO: 0.8 % — SIGNIFICANT CHANGE UP (ref 0–2)
BENZODIAZ UR-MCNC: NEGATIVE — SIGNIFICANT CHANGE UP
BILIRUB SERPL-MCNC: <0.2 MG/DL — SIGNIFICANT CHANGE UP (ref 0.2–1.2)
BUN SERPL-MCNC: 17 MG/DL — SIGNIFICANT CHANGE UP (ref 7–23)
CALCIUM SERPL-MCNC: 9.1 MG/DL — SIGNIFICANT CHANGE UP (ref 8.4–10.5)
CHLORIDE SERPL-SCNC: 109 MMOL/L — HIGH (ref 98–107)
CO2 SERPL-SCNC: 23 MMOL/L — SIGNIFICANT CHANGE UP (ref 22–31)
COCAINE METAB.OTHER UR-MCNC: NEGATIVE — SIGNIFICANT CHANGE UP
CREAT SERPL-MCNC: 1.16 MG/DL — SIGNIFICANT CHANGE UP (ref 0.5–1.3)
CREATININE URINE RESULT, DAU: 53 MG/DL — SIGNIFICANT CHANGE UP
EGFR: 54 ML/MIN/1.73M2 — LOW
EOSINOPHIL # BLD AUTO: 0.01 K/UL — SIGNIFICANT CHANGE UP (ref 0–0.5)
EOSINOPHIL NFR BLD AUTO: 0.2 % — SIGNIFICANT CHANGE UP (ref 0–6)
ETHANOL SERPL-MCNC: <10 MG/DL — SIGNIFICANT CHANGE UP
FLUAV AG NPH QL: SIGNIFICANT CHANGE UP
FLUBV AG NPH QL: SIGNIFICANT CHANGE UP
GLUCOSE SERPL-MCNC: 69 MG/DL — LOW (ref 70–99)
HCT VFR BLD CALC: 34.1 % — LOW (ref 34.5–45)
HGB BLD-MCNC: 10.6 G/DL — LOW (ref 11.5–15.5)
IANC: 2.65 K/UL — SIGNIFICANT CHANGE UP (ref 1.8–7.4)
IMM GRANULOCYTES NFR BLD AUTO: 0.4 % — SIGNIFICANT CHANGE UP (ref 0–0.9)
LITHIUM SERPL-MCNC: <0.1 MMOL/L — LOW (ref 0.6–1.2)
LYMPHOCYTES # BLD AUTO: 1.8 K/UL — SIGNIFICANT CHANGE UP (ref 1–3.3)
LYMPHOCYTES # BLD AUTO: 34 % — SIGNIFICANT CHANGE UP (ref 13–44)
MCHC RBC-ENTMCNC: 29.9 PG — SIGNIFICANT CHANGE UP (ref 27–34)
MCHC RBC-ENTMCNC: 31.1 GM/DL — LOW (ref 32–36)
MCV RBC AUTO: 96.1 FL — SIGNIFICANT CHANGE UP (ref 80–100)
METHADONE UR-MCNC: NEGATIVE — SIGNIFICANT CHANGE UP
MONOCYTES # BLD AUTO: 0.77 K/UL — SIGNIFICANT CHANGE UP (ref 0–0.9)
MONOCYTES NFR BLD AUTO: 14.6 % — HIGH (ref 2–14)
NEUTROPHILS # BLD AUTO: 2.65 K/UL — SIGNIFICANT CHANGE UP (ref 1.8–7.4)
NEUTROPHILS NFR BLD AUTO: 50 % — SIGNIFICANT CHANGE UP (ref 43–77)
NRBC # BLD: 0 /100 WBCS — SIGNIFICANT CHANGE UP (ref 0–0)
NRBC # FLD: 0 K/UL — SIGNIFICANT CHANGE UP (ref 0–0)
OPIATES UR-MCNC: NEGATIVE — SIGNIFICANT CHANGE UP
OXYCODONE UR-MCNC: NEGATIVE — SIGNIFICANT CHANGE UP
PCP SPEC-MCNC: SIGNIFICANT CHANGE UP
PCP UR-MCNC: NEGATIVE — SIGNIFICANT CHANGE UP
PLATELET # BLD AUTO: 220 K/UL — SIGNIFICANT CHANGE UP (ref 150–400)
POTASSIUM SERPL-MCNC: 4.4 MMOL/L — SIGNIFICANT CHANGE UP (ref 3.5–5.3)
POTASSIUM SERPL-SCNC: 4.4 MMOL/L — SIGNIFICANT CHANGE UP (ref 3.5–5.3)
PROT SERPL-MCNC: 7.4 G/DL — SIGNIFICANT CHANGE UP (ref 6–8.3)
RBC # BLD: 3.55 M/UL — LOW (ref 3.8–5.2)
RBC # FLD: 14.9 % — HIGH (ref 10.3–14.5)
RSV RNA NPH QL NAA+NON-PROBE: SIGNIFICANT CHANGE UP
SALICYLATES SERPL-MCNC: <0.3 MG/DL — LOW (ref 15–30)
SARS-COV-2 RNA SPEC QL NAA+PROBE: SIGNIFICANT CHANGE UP
SODIUM SERPL-SCNC: 144 MMOL/L — SIGNIFICANT CHANGE UP (ref 135–145)
THC UR QL: NEGATIVE — SIGNIFICANT CHANGE UP
TOXICOLOGY SCREEN, DRUGS OF ABUSE, SERUM RESULT: SIGNIFICANT CHANGE UP
TSH SERPL-MCNC: 1.93 UIU/ML — SIGNIFICANT CHANGE UP (ref 0.27–4.2)
VALPROATE SERPL-MCNC: 95.8 UG/ML — SIGNIFICANT CHANGE UP (ref 50–100)
WBC # BLD: 5.29 K/UL — SIGNIFICANT CHANGE UP (ref 3.8–10.5)
WBC # FLD AUTO: 5.29 K/UL — SIGNIFICANT CHANGE UP (ref 3.8–10.5)

## 2023-09-13 PROCEDURE — 99285 EMERGENCY DEPT VISIT HI MDM: CPT

## 2023-09-13 PROCEDURE — 99285 EMERGENCY DEPT VISIT HI MDM: CPT | Mod: GC

## 2023-09-13 RX ORDER — DIPHENHYDRAMINE HCL 50 MG
50 CAPSULE ORAL ONCE
Refills: 0 | Status: DISCONTINUED | OUTPATIENT
Start: 2023-09-14 | End: 2023-10-06

## 2023-09-13 RX ORDER — DIVALPROEX SODIUM 500 MG/1
1000 TABLET, DELAYED RELEASE ORAL AT BEDTIME
Refills: 0 | Status: DISCONTINUED | OUTPATIENT
Start: 2023-09-14 | End: 2023-10-24

## 2023-09-13 RX ORDER — HALOPERIDOL DECANOATE 100 MG/ML
2 INJECTION INTRAMUSCULAR EVERY 6 HOURS
Refills: 0 | Status: DISCONTINUED | OUTPATIENT
Start: 2023-09-14 | End: 2023-09-15

## 2023-09-13 RX ORDER — DIPHENHYDRAMINE HCL 50 MG
50 CAPSULE ORAL EVERY 6 HOURS
Refills: 0 | Status: DISCONTINUED | OUTPATIENT
Start: 2023-09-14 | End: 2023-09-15

## 2023-09-13 RX ORDER — HALOPERIDOL DECANOATE 100 MG/ML
2 INJECTION INTRAMUSCULAR ONCE
Refills: 0 | Status: DISCONTINUED | OUTPATIENT
Start: 2023-09-14 | End: 2023-10-06

## 2023-09-13 NOTE — ED ADULT TRIAGE NOTE - CHIEF COMPLAINT QUOTE
pt states was having suicidal thoughts, has been overwhelmed, stressed, worried about how she is going to pay bills. has not been sleeping at night. states does not have suicidal thoughts at this time. no plan, or attempt. no HI. no auditory or visual hallucinations. no drug or alcohol use. pt calm and cooperative in triage. hx. bipolar

## 2023-09-13 NOTE — ED BEHAVIORAL HEALTH ASSESSMENT NOTE - DESCRIPTION
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 Has not required IMs or restraints.  Vital Signs Last 24 Hrs  T(C): 36.9 (09-13-23 @ 20:41), Max: 36.9 (09-13-23 @ 20:41)  T(F): 98.5 (09-13-23 @ 20:41), Max: 98.5 (09-13-23 @ 20:41)  HR: 84 (09-13-23 @ 20:41) (84 - 84)  BP: 143/81 (09-13-23 @ 20:41) (143/81 - 143/81)  BP(mean): --  RR: 16 (09-13-23 @ 20:41) (16 - 16)  SpO2: 99% (09-13-23 @ 20:41) (99% - 99%)
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
## 2023-09-13 NOTE — ED PROVIDER NOTE - NSICDXPASTMEDICALHX_GEN_ALL_CORE_FT
Anesthesia Evaluation     . Pt has had prior anesthetic. Type: General           ROS/MED HX    ENT/Pulmonary:     (+)sleep apnea, doesn't use CPAP , . .    Neurologic: Comment: insomnia      Cardiovascular:     (+) hypertension----. : . . . :. . Previous cardiac testing date:results:date: results:ECG reviewed date:8-21-14 results:SB (56) date: results:          METS/Exercise Tolerance:  >4 METS   Hematologic:  - neg hematologic  ROS       Musculoskeletal: Comment: SI joint pain  Herniation of intervertebral disc L4-5  Biceps tendon tear  Cervical disc disease        GI/Hepatic: Comment: Colon polyps    (+) GERD Other GI/Hepatic chronic diarrhea     (-) liver disease   Renal/Genitourinary:  - ROS Renal section negative       Endo:     (+) Obesity, .      Psychiatric:  - neg psychiatric ROS       Infectious Disease:  - neg infectious disease ROS       Malignancy:      - no malignancy   Other:    - neg other ROS                 Physical Exam  Normal systems: cardiovascular, pulmonary and dental    Airway   Mallampati: II    Dental     Cardiovascular       Pulmonary                     Anesthesia Plan      History & Physical Review  History and physical reviewed and following examination; no interval change.    ASA Status:  2 .    NPO Status:  > 6 hours    Plan for MAC Reason for MAC:  Deep or markedly invasive procedure (G8)         Postoperative Care      Consents                          .  
PAST MEDICAL HISTORY:  Bipolar 1 disorder

## 2023-09-13 NOTE — ED ADULT NURSE REASSESSMENT NOTE - NS ED NURSE REASSESS COMMENT FT1
Pt a&ox4, Denies CP, SOB, dyspnea, or pain at this time. Respirations are even and unlabored, no signs of respiratory distress.

## 2023-09-13 NOTE — ED ADULT TRIAGE NOTE - RESPIRATORY RATE (BREATHS/MIN)
(Please review notes in blue, the remainder of the note is for RN Care Coordination only.)    Dr. Abida Roldan reports BS higher than 120.  He has been taking the 38 units of Lantus. I have reminded him to contact your office to discuss this.  Encouraged better food intake as well, but holidays have been difficult.    No leg swelling or SOB reported.    Thank you,  Amirah LOO  RN Care Coordinator  [phone]      Situation: Follow up call to patient.       Key Assessments: Blood sugars still >120.  Patient discussed difficulty with food intake during holidays.  Plans to \"get back at it\" after holidays are over. Taking 38 units of Lantus daily.     CHF:  Reports stable.  Wearing compression stockings daily. Medications same.       Actions Taken: Reviewed importance of food intake.  Reminded patient to contact PCP office if BS do not stabilize under 120.    Mail: Infosheet:  \"Eating out When You Have Diabetes\"      Plan:   Patient to contact PCP office re: EUGENIO.    RNCC to update PCP.       See hyperlinks within encounter for full documentation          
16

## 2023-09-13 NOTE — ED BEHAVIORAL HEALTH ASSESSMENT NOTE - NSSUICPROTFACT_PSY_ALL_CORE
Identifies reasons for living/Supportive social network of family or friends Identifies reasons for living/Supportive social network of family or friends/Engaged in work or school

## 2023-09-13 NOTE — ED BEHAVIORAL HEALTH ASSESSMENT NOTE - OTHER PAST PSYCHIATRIC HISTORY (INCLUDE DETAILS REGARDING ONSET, COURSE OF ILLNESS, INPATIENT/OUTPATIENT TREATMENT)
history of multiple hospitalizations, recently discharged from [facility] on 9/7/23  has outpatient provider, seen after DC  dx of Bipolar disorder  no prior suicide attempts

## 2023-09-13 NOTE — ED PROVIDER NOTE - OBJECTIVE STATEMENT
This is a 58-year-old female no pertinent past medical history, past psychiatric history bipolar disorder with complaint of increased anxiety and suicidal ideation without a plan. Reports today she felt very down in the morning and had  suicidal ideation but did not have a plan. She did not act on her ideation but  went to her neighbor house and talked it through.  Endorses unable to sleep during the night and pacing for all night. Identifies support system as her daughter Rozina. Reports medication compliance.

## 2023-09-13 NOTE — ED PROVIDER NOTE - CLINICAL SUMMARY MEDICAL DECISION MAKING FREE TEXT BOX
This is a 58-year-old female no pertinent past medical history, past psychiatric history bipolar disorder with complaint of increased anxiety and suicidal ideation without a plan. Reports today she felt very down in the morning and had  suicidal ideation but did not have a plan. She did not act on her ideation but  went to her neighbor house and talked it through.  Endorses unable to sleep during the night and pacing for all night. Identifies support system as her daughter Rozina. Reports medication compliance.  Collateral info by sw, refer to the note.   labs, psych consult

## 2023-09-13 NOTE — ED BEHAVIORAL HEALTH ASSESSMENT NOTE - COLLATERAL SOURCE
Received call from [facility] [place] at [facility] with Red Flag Complaint. Subjective: Caller states \"I am pregnant and I started having vaginal bleeding yesterday. \"     Current Symptoms: Mild bleeding per patient- currently not bleeding while on the phone with this RN. Only blood when urinating- blood is a \"dark brown\"    Onset: Yesterday- 10 am    Associated Symptoms: back pain- lower, headache    Pain Severity: 5/10    Temperature: Yes, \"Slight fever\"- per patient    What has been tried: N/A    LMP: 6-8 weeks pregnant Pregnant: Yes    Recommended disposition: See PCP within 24 Hours    Care advice provided, patient verbalizes understanding; denies any other questions or concerns; instructed to call back for any new or worsening symptoms. Patient/Caller agrees with recommended disposition; writer provided warm transfer to Monae Ni at [facility] for appointment scheduling    Explained to patient through , Toya Morrow, that since she is not an established patient if there are no new patient appointments today recommendation is to be seen at C/ED for symptoms. Patient agreed. Attention Provider: Thank you for allowing me to participate in the care of your patient. The patient was connected to triage in response to information provided to the [facility]. Please do not respond through this encounter as the response is not directed to a shared pool.     Reason for Disposition   Prior history of \"ectopic pregnancy\" or previous tubal surgery (e.g., tubal ligation)    Protocols used: PREGNANCY - VAGINAL BLEEDING LESS THAN 20 WEEKS EGA-ADULT-
Personal collateral

## 2023-09-13 NOTE — ED ADULT NURSE NOTE - NSFALLUNIVINTERV_ED_ALL_ED
Bed/Stretcher in lowest position, wheels locked, appropriate side rails in place/Call bell, personal items and telephone in reach/Instruct patient to call for assistance before getting out of bed/chair/stretcher/Non-slip footwear applied when patient is off stretcher/[place] to call system/Physically safe environment - no spills, clutter or unnecessary equipment/Purposeful proactive rounding/Room/bathroom lighting operational, light cord in reach

## 2023-09-13 NOTE — ED BEHAVIORAL HEALTH ASSESSMENT NOTE - SUMMARY
Patient is a 57y/o F  but in a relationship, domiciled w/  daughter, her boyfriend, employed as a transport dispatch at [employer], w/ PPHx of bipolar d/o followed in [facility], w/ 5 prior psychiatric hospitalizations most recently discharged from [facility] 9/7/23 for montserrat (described as irritability, sleep disturbances, racing thoughts, restlessness), as well as psychotic symptoms (reported to be non command auditory hallucinations, delusional ideas of reference). , no known suicide attempt or violence hx, no known legal hx, no known substance abuse history who was brought in by EMS for concerns for suicidal ideation and bipolar decompensation.    On assessment, patient is calm but is disorganized and a poor historian. States she has not been sleeping and has been doing poorly since discharge from hospital. States she has been feeling depressed and felt so depressed today that she was having suicidal ideation which she has never had before, because she does not want to be a burden to her family, though currently denies Si with intent or plan. Additionally, collateral reports concerns for worsening decompensation since DC with worsening paranoia, and concerns for referential thinking and delusions of reference, and concerns for AH. Given presentation, and concerns for worsening symptoms (disorganization, delusions/paranoia, insomnia) and suicidal ideation patient cannot safely be discharged and requires hospitalization for further stabilization and evaluation.    Dx: Bipolar I Disorder    Plan:  1.	Legals: admit on 9.39  2.	Safety: routine observation, denies SI/HI/I/P on the unit. PRNs: Ativan/Haldol/Benadryl PO/IM  3.	Psychiatry:                        - c/w Depakote 1000mg qHS (pending level)                        - give 1x Seroquel 25mg qHS for insomnia and additional benefit given potential concern for bipolar depression. will defer to primary team whether to continue medication given prior history of concern for anticholinergic delirium  4.	Group, milieu, individual therapy as appropriate.  5.	Medical: no acute medical issues, no significant PMH.  Admission labs WNL.  6.	Dispo: pending clinical improvement.  Patient continues to require inpatient hospitalization for stabilization and safety. 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, w/ 5 prior psychiatric hospitalizations most recently discharged from [facility] 9/7/23 for montserrat (described as irritability, sleep disturbances, racing thoughts, restlessness), as well as psychotic symptoms (reported to be non command auditory hallucinations, delusional ideas of reference). , no known suicide attempt or violence hx, no known legal hx, no known substance abuse history who was brought in by EMS for concerns for suicidal ideation and bipolar decompensation.    On assessment, patient is calm but is disorganized and a poor historian. States she has not been sleeping and has been doing poorly since discharge from hospital. States she has been feeling depressed and felt so depressed today that she was having suicidal ideation which she has never had before, because she does not want to be a burden to her family, though currently denies Si with intent or plan. Additionally, collateral reports concerns for worsening decompensation since DC with worsening paranoia, and concerns for referential thinking and delusions of reference, and concerns for AH. Given presentation, and concerns for worsening symptoms (disorganization, delusions/paranoia, insomnia) and suicidal ideation patient cannot safely be discharged and requires hospitalization for further stabilization and evaluation.    Dx: Bipolar I Disorder    Plan:  1.	Legals: admit on 9.39  2.	Safety: routine observation, denies SI/HI/I/P on the unit. PRNs: Ativan/Haldol/Benadryl PO/IM  3.	Psychiatry:                        - c/w Depakote 1000mg qHS (pending level)                        - give 1x Seroquel 25mg qHS for insomnia and additional benefit given potential concern for bipolar depression. will defer to primary team whether to continue medication given prior history of concern for anticholinergic delirium  4.	Group, milieu, individual therapy as appropriate.  5.	Medical: no acute medical issues, no significant PMH.  Admission labs WNL.

## 2023-09-13 NOTE — ED BEHAVIORAL HEALTH ASSESSMENT NOTE - PSYCHIATRIC ISSUES AND PLAN (INCLUDE STANDING AND PRN MEDICATION)
Continue with Depakote 1000mg; start 1x Seroquel 25mg qHS for sleep (given inability to sleep and potential for bipolar depression, will defer starting it as standing given previous concenr for anticholinergic delirium)

## 2023-09-13 NOTE — ED ADULT NURSE NOTE - OBJECTIVE STATEMENT
57 y/o female, a&ox4, ambulatory, received to ED from home for suicidal thoughts. Past medical history of bipolar disorder 1, recently discharged from [facility] on 9/7/2023, lives with her daughter and partner. Endorses suicidal thoughts and feeling depressed related to finances, no plan or suicide attempts. Pt presents calm and cooperative, endorses improvement in condition while in the ED. Pt denies SI/HI at this time. Pt denies visual or auditory hallucinations or other complaints. Pt belongings checked and secured by staff.  Pt checked by metal detector. Pt changed into gown and scrubs. Labs collected via butterfly stick. Respirations are even and unlabored, no signs of respiratory distress.
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
## 2023-09-13 NOTE — ED BEHAVIORAL HEALTH ASSESSMENT NOTE - NSSUICRSKFACTOR_PSY_ALL_CORE
Patient:   JOS MAYER1            MRN: [id number]            FIN: [id number]               Age:   15 hours     Sex:  FEMALE     :  16   Associated Diagnoses:   None   Author:   TITUS JUARESENA      MOM AWARE OF TEACHING INSTITUTION HOWEVER REQUESTING ONLY ONE EXAM AND VISIT PER DAY FOR TWIN 2.    :  2018 16:36  AGE:  15 Hours  Duration of Current Hospitalization:  15 Hours   Gestational Age at Birth:  37 4/7   Corrected Gestational Age:  37 4/7     GENERAL INFORMATION:   YOB: 2020  Time of Birth: 1636  Gestational age at birth: 37 weeks 4 days    BIRTH MEASUREMENTS:  Birth weight: 2470 grams (AGA)  Length: 49.5 cm  Head circumference: 32.5 cm    MATERNAL HISTORY   Maternal age: 32   5                  Para 2103  Ethnicity: black    CURRENT PREGNANCY:   Maternal Past Medical History: denies  Medications during pregnancy: PNV  Complications during pregnancy: Boy Twin 2 IUGR and breech; received care at [facility] with plan for 2/3 induction at [facility]    PRENATAL LABS:   Blood type O+, antibody negative  HIV negative,   RPR non reactive  Rubella immune  Hep B negative  GBS negative  +Utox cannabinoids    MEDICATIONS DURING DELIVERY: routine     HISTORY   Rupture of membranes (Date/Time): SROM  @0600 (10h 36m)   Fluid: clear     INFORMATION     MODE OF DELIVERY:    Delivery Type:   Vaginal, Spontaneous    DELIVERY ROOM RESUSCITATION:   Delayed cord clamping (Yes / No): unk  Resuscitation: routine    APGAR: 9 / 9      Vitals between:   2018 08:18:54   TO   2018 08:18:54                   LAST RESULT MINIMUM MAXIMUM  Temperature 36.6 36.5 37.0  Heart Rate 146 132 160  Respiratory Rate 42 40 48    General: No acute distress, well appearing, responds to stimuli appropriately  Eye: Normal conjunctiva,   HENT: Normocephalic, Anterior fontanelle open/soft/flat  Neck: Supple, clavicles intact b/l  Respiratory: Lungs are clear to  auscultation, Respirations are non-labored, Breath sounds are equal, Symmetrical chest wall expansion  Cardiovascular: Normal rate, Regular rhythm, No murmur, Normal peripheral perfusion  Gastrointestinal: Soft, Non-tender, Non-distended, Anus patent  Genitourinary: Normal genitalia for age and sex, No lesions  Musculoskeletal: No swelling. No deformity, no hip clicks, +baralow +ortolani  Skin: Dry, No pallor, No rash, noted Panamanian spot on sacrum  Neurologic: No focal deficits, appropriate tone, +root +jeison +palmar +plantar +galant     Dosing Weight:   2.470 kg    2018 17:00  Most Recent Clinical Weight:   2.385  kg    2018 06:34    Previous Clinical Weight:   2.470  kg 2018 17:00  Changed:   -   %3.44    85.00 gm    I & O between:  2018 08:18 TO 2018 08:18  Med Dosing Weight:  2.470  kg   2018  24 Hour Intake:   30.00  ( 12.15 mL/kg )  Breastfeeding Count:   4  24 Hour Output:   0.00           24 Hour Urine/Stool Output:   0.0  24 Hour Balance:   30.00           24 Hour Urine Output:   0.00  ( 0.00 mL/kg/hr )                    Stool Count:  2.00       Labs between:  2018 08:18 to 2018 08:18    POC GLU:                 Latest Result  Latest Date  Minimum  Min Date  Maximum  Max Date                             53 20182018                  Delivery Type:   Vaginal, Spontaneous    Medications (1) Active  Scheduled: (0)  Continuous: (0)  PRN: (1)  Dextrose (glucose) 40% 15 gm/37.5 gm oral gel UD  0.5 gm, Oral, As Directed PRN    Vitamin K and Erythromycin oint: Yes    PROBLEM LIST:   Term  - TWIN 1 of 2  [facility]    ASSESSMENT:   FLUIDS, ELECTROLYTES AND NUTRITION:   Feeding: MBM at breast    BW (DOL 1): 2470g  DOL 2 Weight: 2385 (down 3.4% from BW)  DOL 3 Weight: _ (down _% from BW)    HYPERBILIRUBIN:   Risk factors for hyperbilirubinemia: less than 38wga; Baby O+  Bilirubin level at 24 hours: _ mg/dl  Risk zone:  _  Light Level: _    RISK FACTORS FOR SEPSIS:   no increased risk for sepsis given maternal history and patient clinical status    SCREENING TESTS / IMMUNIZATIONS:     Orders:     SCREEN [NEOS] ( Ordered ) 2018 18:02      Results:       Vaccine Name: hepatitis B pediatric vaccine ( Completed ) Ordered On 2018 18:02  -    Administered: Refused. Mom said \"I don't give my kids that stuff\" and that she \"had already heard it\". Discussed increased risk of becoming chronic carrier and developing liver cancer, Mom responded by rolling her eyes. Mom's other children's are not vaccinated.     Hearing Screening has not yet been documented.   CCHD has not yet been documented.     Car Seat Screening has not yet been documented.    SOCIAL:   Spoke to parents in-depth. All questions and concerns addressed. Parents expressed verbal understanding.  Anticipatory guidance discussed.   [place] folder will not be provided prior to discharge  SW consulted for +maternal Utox for cannabinoids, out of network insurance and 5 kids under age 5 at home (including twins)  Mec tox ordered    PEDIATRICIAN: [facility]    FOLLOW UP APPOINTMENTS: Mom aware she needs to make appt prior to discharge.  [place] appointment:     DISPO: Anticipate discharge planning to start after 2 given maternal GBS status and delivery method; Mom anticipating leaving 2.    Discussed with Family & will discuss with Attending, Dr. Pratt.  Carmelina Hooker DO PGY2 [phone]  [facility]            Electronically Signed On 2018 08:45  __________________________________________________   CARMELINA JUARES              Examined the baby and agree with the resident's documentations.            Electronically Signed On 2018 12:28  __________________________________________________   ARMIN JOSE     30 yo M no sig pmh presents with urinary frequency x5 days.   VSS.  Exam unremarkable.  Possible uti, however patient with reported neg ua.  No c/f pyelo at this time.  Patient low risk sti profile.  Exam not c/w prostatitis.  Plan for ua, ucx, g/c.  Dispo pending. Current and Past Psychiatric Diagnoses Current and Past Psychiatric Diagnoses/Presenting Symptoms/Treatment Related Factors/Activating Events/Stressors

## 2023-09-13 NOTE — ED BEHAVIORAL HEALTH ASSESSMENT NOTE - RISK ASSESSMENT
Patient is at low acute risk for suicide. Patient did report suicidal ideation earlier today but currently denies any suicidal ideation with no intent or plan.  Risk factors: insomnia, acute medical conditions, hopelessness/feeling like a burden, multiple prior admissions  Protective factors: no prior SAs, future oriented, responsibility to family/others, limited access to lethal means  While patient is at elevated CHRONIC risk, pt is currently at low acute risk of suicide.  Immediate risk is minimized by inpatient admission to safe environment with appropriate supervision and limited access to lethal means. Future risk to be minimized by treatment of symptoms, maximizing outpatient supports, providing patient education, discussing emergency procedures, and ensuring close follow-up.

## 2023-09-13 NOTE — ED BEHAVIORAL HEALTH ASSESSMENT NOTE - NSBHATTESTCOMMENTATTENDFT_PSY_A_CORE
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 , no known suicide attempt or violence hx, no known legal hx, no known substance abuse history who was brought in by EMS for concerns for suicidal ideation and bipolar decompensation.    On evaluation, patient is alert, calm and cooperative however is endorsing symptoms of montserrat with psychotic features, evidenced by disorganized speech/thought process, insomnia, irritability, thought blocking and also endorsed depressed mood, hopelessness and suicidal ideations, without intent or plan and with collateral concerns for acute decompensation of underlying Bipolar I Disorder.  Given patient's sx, at this time, she appears to be at imminent risk of self harm due to inability to care for self and meets criteria for involuntary inpatient psychiatric hospitalization at this time. Contracts for safety while on the unit and there does not appear to be indication for 1:1 at this time. Recommendations as per summary section above.

## 2023-09-13 NOTE — ED BEHAVIORAL HEALTH ASSESSMENT NOTE - DETAILS
Recently discharged from  9/7/23 lives with daughter per protocol informed reports having active Si today and told neighbor, denies any intent or plan. currently denies SI unavailable Lithium toxicity, concern for anticholinergic delirium on zyprexa after hours

## 2023-09-14 PROCEDURE — 99222 1ST HOSP IP/OBS MODERATE 55: CPT

## 2023-09-14 RX ORDER — QUETIAPINE FUMARATE 200 MG/1
25 TABLET, FILM COATED ORAL AT BEDTIME
Refills: 0 | Status: DISCONTINUED | OUTPATIENT
Start: 2023-09-14 | End: 2023-09-15

## 2023-09-14 RX ORDER — QUETIAPINE FUMARATE 200 MG/1
25 TABLET, FILM COATED ORAL ONCE
Refills: 0 | Status: COMPLETED | OUTPATIENT
Start: 2023-09-14 | End: 2023-09-14

## 2023-09-14 RX ORDER — DIVALPROEX SODIUM 500 MG/1
1000 TABLET, DELAYED RELEASE ORAL ONCE
Refills: 0 | Status: COMPLETED | OUTPATIENT
Start: 2023-09-14 | End: 2023-09-14

## 2023-09-14 RX ORDER — SALIVA SUBSTITUTE COMB NO.11 351 MG
15 POWDER IN PACKET (EA) MUCOUS MEMBRANE THREE TIMES A DAY
Refills: 0 | Status: DISCONTINUED | OUTPATIENT
Start: 2023-09-14 | End: 2023-10-24

## 2023-09-14 RX ADMIN — DIVALPROEX SODIUM 1000 MILLIGRAM(S): 500 TABLET, DELAYED RELEASE ORAL at 01:19

## 2023-09-14 RX ADMIN — DIVALPROEX SODIUM 1000 MILLIGRAM(S): 500 TABLET, DELAYED RELEASE ORAL at 20:48

## 2023-09-14 RX ADMIN — QUETIAPINE FUMARATE 25 MILLIGRAM(S): 200 TABLET, FILM COATED ORAL at 20:59

## 2023-09-14 RX ADMIN — QUETIAPINE FUMARATE 25 MILLIGRAM(S): 200 TABLET, FILM COATED ORAL at 01:09

## 2023-09-14 NOTE — BH INPATIENT PSYCHIATRY ASSESSMENT NOTE - NSBHATTESTAPPBILLTIME_PSY_A_CORE
I attest my time as ALEKSEY is greater than 50% of the total combined time spent on qualifying patient care activities. I have reviewed and verified the documentation.

## 2023-09-14 NOTE — BH INPATIENT PSYCHIATRY ASSESSMENT NOTE - CURRENT MEDICATION
MEDICATIONS  (STANDING):  divalproex ER 1000 milliGRAM(s) Oral at bedtime    MEDICATIONS  (PRN):  diphenhydrAMINE 50 milliGRAM(s) Oral every 6 hours PRN agitation  diphenhydrAMINE Injectable 50 milliGRAM(s) IntraMuscular once PRN agitation  haloperidol     Tablet 2 milliGRAM(s) Oral every 6 hours PRN agitation  haloperidol    Injectable 2 milliGRAM(s) IntraMuscular Once PRN agitation  LORazepam     Tablet 1 milliGRAM(s) Oral every 6 hours PRN Agitation  LORazepam   Injectable 1 milliGRAM(s) IntraMuscular Once PRN Agitation   MEDICATIONS  (STANDING):  Biotene Dry Mouth Oral Rinse 15 milliLiter(s) Swish and Spit three times a day  divalproex ER 1000 milliGRAM(s) Oral at bedtime  QUEtiapine 25 milliGRAM(s) Oral at bedtime    MEDICATIONS  (PRN):  diphenhydrAMINE 50 milliGRAM(s) Oral every 6 hours PRN agitation  diphenhydrAMINE Injectable 50 milliGRAM(s) IntraMuscular once PRN agitation  haloperidol     Tablet 2 milliGRAM(s) Oral every 6 hours PRN agitation  haloperidol    Injectable 2 milliGRAM(s) IntraMuscular Once PRN agitation  LORazepam     Tablet 1 milliGRAM(s) Oral every 6 hours PRN Agitation  LORazepam   Injectable 1 milliGRAM(s) IntraMuscular Once PRN Agitation

## 2023-09-14 NOTE — BH INPATIENT PSYCHIATRY ASSESSMENT NOTE - NSBHATTESTATTENDPERFORM_PSY_A_CORE
Problem: DISCHARGE PLANNING - CARE MANAGEMENT  Goal: Discharge to post-acute care or home with appropriate resources  INTERVENTIONS:  - Conduct assessment to determine patient/family and health care team treatment goals, and need for post-acute services based on payer coverage, community resources, and patient preferences, and barriers to discharge  - Address psychosocial, clinical, and financial barriers to discharge as identified in assessment in conjunction with the patient/family and health care team  - Arrange appropriate level of post-acute services according to patients   needs and preference and payer coverage in collaboration with the physician and health care team  - Communicate with and update the patient/family, physician, and health care team regarding progress on the discharge plan  - Arrange appropriate transportation to post-acute venues  Outcome: Adequate for Discharge  Patient to be discharged with appropriate services when medically cleared by MD LEI to follow as needed  Medical Decision Making

## 2023-09-14 NOTE — PSYCHIATRIC REHAB INITIAL EVALUATION - NSBHPRRECOMMEND_PSY_ALL_CORE
met with patient to orient her to psychiatric rehabilitation staff and services. Upon approach, patient was guarded, irritable, and refused to engage in session with writeasiya. Patient stated that she would like to be discharged to pay the mortgage and return to her job. Per chart, patient presents with worsening SI and bipolar decompensation. Psychiatric rehabilitation staff established a treatment goal for the patient to work on over the next 7 days. Psychiatric rehabilitation staff will continue to provide encouragement, support, psychotherapy, and psychoeducation to assist the patient in the progression of her treatment goal.

## 2023-09-14 NOTE — BH INPATIENT PSYCHIATRY ASSESSMENT NOTE - DETAILS
Community Health Worker case created to assist with Unable to Reach (UTR).     Initial Call ATTEMPTED on 4/27/2023.     CHW will make next attempt Call on 4/28/23.     During home visit, the following screenings were completed:    SDOH Screening N.a  Home safety screen: N.a    CHW provided patient with the following education/resources: [email or web address]       Lithium toxicity, concern for anticholinergic delirium on zyprexa reports having active Si today and told neighbor, denies any intent or plan. currently denies SI
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
## 2023-09-14 NOTE — BH INPATIENT PSYCHIATRY ASSESSMENT NOTE - NSBHASSESSSUMMFT_PSY_ALL_CORE
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 , no known suicide attempt or violence hx, no known legal hx, no known substance abuse history who was brought in by EMS for concerns for suicidal ideation and bipolar decompensation.    Differential Diagnosis: Bipolar Disorder, Bipolar disorder with psychotic features, Schizoaffective disorder    On assessment today, patient was thought blocked, paranoid, delusional, guarded, had VH, denied AH, SIIP currently.     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 , no known suicide attempt or violence hx, no known legal hx, no known substance abuse history who was brought in by EMS for concerns for suicidal ideation and bipolar decompensation.    Differential Diagnosis: Bipolar Disorder, Bipolar disorder with psychotic features, Schizoaffective disorder    On assessment today, patient was thought blocked, paranoid, delusional, guarded, had VH, denied AH, SIIP currently.     Plan:   -Admit to [facility], 9.39  -Routine observation q15 checks, No CO needed   -Psychiatry:   c/w Depakote ER 1000mg at bedtime for Bipolar d/o, Seroquel 25mg at bedtime for Mood/psychosis  diphenhydrAMINE 50 milliGRAM(s) Oral every 6 hours PRN agitation  diphenhydrAMINE Injectable 50 milliGRAM(s) IntraMuscular once PRN agitation  haloperidol     Tablet 2 milliGRAM(s) Oral every 6 hours PRN agitation  haloperidol    Injectable 2 milliGRAM(s) IntraMuscular Once PRN agitation  LORazepam     Tablet 1 milliGRAM(s) Oral every 6 hours PRN Agitation  LORazepam   Injectable 1 milliGRAM(s) IntraMuscular Once PRN Agitation  -Medical: Dry mouth; Biotene ordered   -Group and milieu therapy   -Dispo as per social work

## 2023-09-14 NOTE — BH INPATIENT PSYCHIATRY ASSESSMENT NOTE - NSBHMETABOLIC_PSY_ALL_CORE_FT
BMI: BMI (kg/m2): 29.3 (09-14-23 @ 00:56)  HbA1c: A1C with Estimated Average Glucose Result: 5.5 % (08-25-23 @ 13:32)    Glucose: POCT Blood Glucose.: 105 mg/dL (09-13-23 @ 23:21)    BP: 128/61 (09-13-23 @ 23:56) (128/61 - 143/81)  Lipid Panel: Date/Time: 05-26-23 @ 13:30  Cholesterol, Serum: 159  Direct LDL: --  HDL Cholesterol, Serum: 33  Total Cholesterol/HDL Ration Measurement: --  Triglycerides, Serum: 197

## 2023-09-14 NOTE — BH PATIENT PROFILE - FLU SEASON?
Pt says that he would like to know if his lab work was faxed over and received from Dr Anam Brunson? Please advise.    Yes...

## 2023-09-14 NOTE — BH PATIENT PROFILE - HOME MEDICATIONS
melatonin 3 mg oral tablet , 1 tab(s) orally once a day (at bedtime)  divalproex sodium 500 mg oral tablet, extended release , 2 tab(s) orally once a day (at bedtime)

## 2023-09-14 NOTE — BH INPATIENT PSYCHIATRY ASSESSMENT NOTE - OTHER PAST PSYCHIATRIC HISTORY (INCLUDE DETAILS REGARDING ONSET, COURSE OF ILLNESS, INPATIENT/OUTPATIENT TREATMENT)
history of multiple hospitalizations, recently discharged from [facility] on 9/7/23  has outpatient provider, seen after DC  dx of Bipolar disorder  no prior suicide attempts

## 2023-09-14 NOTE — BH INPATIENT PSYCHIATRY ASSESSMENT NOTE - ATTENDING COMMENTS
Care was discussed and reviewed in interdisciplinary treatment team.  I, Veronika Gonzalez MD, have reviewed and verified the documentation.  I independently performed the documented medical decision making.

## 2023-09-14 NOTE — BH INPATIENT PSYCHIATRY ASSESSMENT NOTE - NSBHCHARTREVIEWVS_PSY_A_CORE FT
INR at goal. Medications and chart reviewed. No changes noted to necessitate adjustment of warfarin or follow-up plan. See calendar.     Vital Signs Last 24 Hrs  T(C): 36.2 (09-14-23 @ 00:56), Max: 36.9 (09-13-23 @ 20:41)  T(F): 97.1 (09-14-23 @ 00:56), Max: 98.5 (09-13-23 @ 20:41)  HR: 82 (09-13-23 @ 23:56) (82 - 84)  BP: 128/61 (09-13-23 @ 23:56) (128/61 - 143/81)  BP(mean): --  RR: 18 (09-14-23 @ 00:56) (16 - 18)  SpO2: 100% (09-13-23 @ 23:56) (99% - 100%)    Orthostatic VS  09-14-23 @ 00:56  Lying BP: --/-- HR: --  Sitting BP: 135/76 HR: 86  Standing BP: 128/82 HR: 96  Site: --  Mode: --

## 2023-09-15 PROCEDURE — 99232 SBSQ HOSP IP/OBS MODERATE 35: CPT

## 2023-09-15 RX ORDER — DIPHENHYDRAMINE HCL 50 MG
50 CAPSULE ORAL EVERY 6 HOURS
Refills: 0 | Status: DISCONTINUED | OUTPATIENT
Start: 2023-09-15 | End: 2023-10-06

## 2023-09-15 RX ORDER — QUETIAPINE FUMARATE 200 MG/1
50 TABLET, FILM COATED ORAL AT BEDTIME
Refills: 0 | Status: DISCONTINUED | OUTPATIENT
Start: 2023-09-15 | End: 2023-09-16

## 2023-09-15 RX ORDER — HALOPERIDOL DECANOATE 100 MG/ML
2 INJECTION INTRAMUSCULAR EVERY 6 HOURS
Refills: 0 | Status: DISCONTINUED | OUTPATIENT
Start: 2023-09-15 | End: 2023-10-06

## 2023-09-15 RX ORDER — MAGNESIUM HYDROXIDE 400 MG/1
30 TABLET, CHEWABLE ORAL DAILY
Refills: 0 | Status: DISCONTINUED | OUTPATIENT
Start: 2023-09-15 | End: 2023-10-24

## 2023-09-15 RX ORDER — LANOLIN ALCOHOL/MO/W.PET/CERES
3 CREAM (GRAM) TOPICAL AT BEDTIME
Refills: 0 | Status: DISCONTINUED | OUTPATIENT
Start: 2023-09-15 | End: 2023-10-24

## 2023-09-15 RX ORDER — ACETAMINOPHEN 500 MG
650 TABLET ORAL EVERY 6 HOURS
Refills: 0 | Status: DISCONTINUED | OUTPATIENT
Start: 2023-09-15 | End: 2023-10-24

## 2023-09-15 RX ORDER — LANOLIN ALCOHOL/MO/W.PET/CERES
5 CREAM (GRAM) TOPICAL ONCE
Refills: 0 | Status: COMPLETED | OUTPATIENT
Start: 2023-09-15 | End: 2023-09-15

## 2023-09-15 RX ADMIN — DIVALPROEX SODIUM 1000 MILLIGRAM(S): 500 TABLET, DELAYED RELEASE ORAL at 21:26

## 2023-09-15 RX ADMIN — QUETIAPINE FUMARATE 50 MILLIGRAM(S): 200 TABLET, FILM COATED ORAL at 21:27

## 2023-09-15 RX ADMIN — Medication 3 MILLIGRAM(S): at 21:26

## 2023-09-15 NOTE — BH INPATIENT PSYCHIATRY PROGRESS NOTE - NSBHASSESSSUMMFT_PSY_ALL_CORE
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 , no known suicide attempt or violence hx, no known legal hx, no known substance abuse history who was brought in by EMS for concerns for suicidal ideation and bipolar decompensation.    Differential Diagnosis: Bipolar Disorder, Bipolar disorder with psychotic features, Schizoaffective disorder    On assessment today, patient was thought blocked, paranoid, delusional, guarded, had VH, denied AH, SIIP currently.     Plan:   -Admit to [facility], 9.39  -Routine observation q15 checks, No CO needed   -Psychiatry:   c/w Depakote ER 1000mg at bedtime for Bipolar d/o, Seroquel 25mg at bedtime for Mood/psychosis  diphenhydrAMINE 50 milliGRAM(s) Oral every 6 hours PRN agitation  diphenhydrAMINE Injectable 50 milliGRAM(s) IntraMuscular once PRN agitation  haloperidol     Tablet 2 milliGRAM(s) Oral every 6 hours PRN agitation  haloperidol    Injectable 2 milliGRAM(s) IntraMuscular Once PRN agitation  LORazepam     Tablet 1 milliGRAM(s) Oral every 6 hours PRN Agitation  LORazepam   Injectable 1 milliGRAM(s) IntraMuscular Once PRN Agitation  -Medical: Dry mouth; Biotene ordered   -Group and milieu therapy   -Dispo as per social work     Patient is a 57y/o F  but in a relationship, domiciled w/  daughter, her boyfriend, 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 , no known suicide attempt or violence hx, no known legal hx, no known substance abuse history who was brought in by EMS for concerns for suicidal ideation and bipolar decompensation.    Differential Diagnosis: Bipolar Disorder, Bipolar disorder with psychotic features, Schizoaffective disorder    On assessment today, patient was thought blocked, paranoid, delusional, guarded, denied AH, SIIP currently.     Plan:   -Admit to [facility], 9.39  -Routine observation q15 checks, No CO needed in a locked, supervised setting  -Psychiatry:   c/w Depakote ER 1000mg at bedtime for Bipolar d/o, increase Seroquel to 50mg at bedtime for Mood/psychosis  diphenhydrAMINE 50 milliGRAM(s) Oral every 6 hours PRN agitation  diphenhydrAMINE Injectable 50 milliGRAM(s) IntraMuscular once PRN agitation  haloperidol     Tablet 2 milliGRAM(s) Oral every 6 hours PRN agitation  haloperidol    Injectable 2 milliGRAM(s) IntraMuscular Once PRN agitation  LORazepam     Tablet 1 milliGRAM(s) Oral every 6 hours PRN Agitation  LORazepam   Injectable 1 milliGRAM(s) IntraMuscular Once PRN Agitation  -Medical: Dry mouth; Biotene ordered   -Group and milieu therapy   -Dispo as per social work

## 2023-09-15 NOTE — BH INPATIENT PSYCHIATRY PROGRESS NOTE - PRN MEDS
MEDICATIONS  (PRN):  acetaminophen     Tablet .. 650 milliGRAM(s) Oral every 6 hours PRN Temp greater or equal to 38C (100.4F), Mild Pain (1 - 3), Moderate Pain (4 - 6), Severe Pain (7 - 10)  aluminum hydroxide/magnesium hydroxide/simethicone Suspension 30 milliLiter(s) Oral every 6 hours PRN Dyspepsia  diphenhydrAMINE 50 milliGRAM(s) Oral every 6 hours PRN agitation/EPS  diphenhydrAMINE Injectable 50 milliGRAM(s) IntraMuscular once PRN agitation  haloperidol     Tablet 2 milliGRAM(s) Oral every 6 hours PRN agitation  haloperidol    Injectable 2 milliGRAM(s) IntraMuscular Once PRN agitation  LORazepam     Tablet 1 milliGRAM(s) Oral every 6 hours PRN Agitation  LORazepam   Injectable 1 milliGRAM(s) IntraMuscular Once PRN Agitation  magnesium hydroxide Suspension 30 milliLiter(s) Oral daily PRN Constipation

## 2023-09-15 NOTE — BH SOCIAL WORK INITIAL PSYCHOSOCIAL EVALUATION - LIVING ENVIRONMENT
The patient has had increased pain across back of neck and back of shoulders for the last few week. In the last few days when he raises his left arm he has pain from left shoulder, elbow, and lower back. When starting gabapentin he had sleepiness, drowsiness but it has helped with the pain and side effects have resolved. States surgery is very last recourse after conservative therapy.  Pain has actually improved temporarily currently.    Discussed that he should increase his gabapentin by 1 capsule each week until he reaches a dosage of 600 mg 3 times daily.  I have written a new prescription to reflect this increased dosage.    We discussed the results of his EMG/nerve conduction study.  The patient has brought wrist braces off [employer] and uses them on and off for several years.  Recently he has been using it on and off but not consistently, but worst case scenario currently in terms of his symptoms he does not feel like he needs surgery at least at this time and is happy with using wrist braces.  I encouraged him to use this consistently even if he is not having overt symptoms of carpal tunnel syndrome as this may prevent his carpal tunnel syndrome from getting worse.  
no

## 2023-09-15 NOTE — BH INPATIENT PSYCHIATRY PROGRESS NOTE - NSBHCHARTREVIEWVS_PSY_A_CORE FT
Vital Signs Last 24 Hrs  T(C): 36.9 (09-15-23 @ 08:03), Max: 36.9 (09-15-23 @ 08:03)  T(F): 98.4 (09-15-23 @ 08:03), Max: 98.4 (09-15-23 @ 08:03)  HR: --  BP: --  BP(mean): --  RR: --  SpO2: --    Orthostatic VS  09-15-23 @ 08:03  Lying BP: --/-- HR: --  Sitting BP: 144/77 HR: 87  Standing BP: 138/73 HR: 92  Site: --  Mode: --  Orthostatic VS  09-14-23 @ 00:56  Lying BP: --/-- HR: --  Sitting BP: 135/76 HR: 86  Standing BP: 128/82 HR: 96  Site: --  Mode: --   Vital Signs Last 24 Hrs  T(C): 36.9 (09-15-23 @ 08:03), Max: 36.9 (09-15-23 @ 08:03)  T(F): 98.4 (09-15-23 @ 08:03), Max: 98.4 (09-15-23 @ 08:03)  HR: --  BP: --  BP(mean): --  RR: --  SpO2: --    Orthostatic VS  09-15-23 @ 08:03  Lying BP: --/-- HR: --  Sitting BP: 144/77 HR: 87  Standing BP: 138/73 HR: 92  Site: --  Mode: --

## 2023-09-15 NOTE — BH INPATIENT PSYCHIATRY PROGRESS NOTE - CURRENT MEDICATION
MEDICATIONS  (STANDING):  Biotene Dry Mouth Oral Rinse 15 milliLiter(s) Swish and Spit three times a day  divalproex ER 1000 milliGRAM(s) Oral at bedtime  melatonin. 3 milliGRAM(s) Oral at bedtime  QUEtiapine 50 milliGRAM(s) Oral at bedtime    MEDICATIONS  (PRN):  acetaminophen     Tablet .. 650 milliGRAM(s) Oral every 6 hours PRN Temp greater or equal to 38C (100.4F), Mild Pain (1 - 3), Moderate Pain (4 - 6), Severe Pain (7 - 10)  aluminum hydroxide/magnesium hydroxide/simethicone Suspension 30 milliLiter(s) Oral every 6 hours PRN Dyspepsia  diphenhydrAMINE 50 milliGRAM(s) Oral every 6 hours PRN agitation/EPS  diphenhydrAMINE Injectable 50 milliGRAM(s) IntraMuscular once PRN agitation  haloperidol     Tablet 2 milliGRAM(s) Oral every 6 hours PRN agitation  haloperidol    Injectable 2 milliGRAM(s) IntraMuscular Once PRN agitation  LORazepam     Tablet 1 milliGRAM(s) Oral every 6 hours PRN Agitation  LORazepam   Injectable 1 milliGRAM(s) IntraMuscular Once PRN Agitation  magnesium hydroxide Suspension 30 milliLiter(s) Oral daily PRN Constipation

## 2023-09-15 NOTE — BH INPATIENT PSYCHIATRY PROGRESS NOTE - NSBHFUPINTERVALHXFT_PSY_A_CORE
Pt is seen and evaluated for follow up for Bipolar I disorder.  Chart is reviewed and case is d/w interdisciplinary care team.  No issues or adverse events overnight.  Pt is showing limited insight, guarded and appears visibly suspicious, notably paranoid on exam.  Illogical thought process noted, denies SI/HI, denies AH/VH but does appear to be internally preoccupied at times.  No noted side effects, no EPS, no acute agitation and in no apparent distress.      RN reports GI discomfort, Maalox prn provided.

## 2023-09-15 NOTE — BH SOCIAL WORK INITIAL PSYCHOSOCIAL EVALUATION - OTHER PAST PSYCHIATRIC HISTORY (INCLUDE DETAILS REGARDING ONSET, COURSE OF ILLNESS, INPATIENT/OUTPATIENT TREATMENT)
58F w/bipolar I disorder, 2 prior admission, last at [facility] until 09/07/23, no hx of SA or violence, admitted with mood symptoms/psychosis in context of recently stopping lithium due to toxicity.

## 2023-09-16 PROCEDURE — 99232 SBSQ HOSP IP/OBS MODERATE 35: CPT

## 2023-09-16 RX ORDER — QUETIAPINE FUMARATE 200 MG/1
75 TABLET, FILM COATED ORAL AT BEDTIME
Refills: 0 | Status: DISCONTINUED | OUTPATIENT
Start: 2023-09-16 | End: 2023-09-18

## 2023-09-16 RX ADMIN — DIVALPROEX SODIUM 1000 MILLIGRAM(S): 500 TABLET, DELAYED RELEASE ORAL at 21:42

## 2023-09-16 RX ADMIN — Medication 3 MILLIGRAM(S): at 21:43

## 2023-09-16 NOTE — BH INPATIENT PSYCHIATRY PROGRESS NOTE - NSBHFUPINTERVALHXFT_PSY_A_CORE
f/up bipolar d/o, vitals pending, Patient is oddly related, suspicious, guarded, reported sleeping well and with decreased appetite--reported that she had lunch. Patient denied dizziness or constipation. Denied SI/I/P. Patient reported that people are setting her up in life. denied SE from meds.

## 2023-09-16 NOTE — BH INPATIENT PSYCHIATRY PROGRESS NOTE - NSBHASSESSSUMMFT_PSY_ALL_CORE
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 , no known suicide attempt or violence hx, no known legal hx, no known substance abuse history who was brought in by EMS for concerns for suicidal ideation and bipolar decompensation.    Differential Diagnosis: Bipolar Disorder, Bipolar disorder with psychotic features, Schizoaffective disorder    On assessment,  patient remains thought blocked, illogical,  paranoid, delusional, guarded, denied AH, SIIP currently.     Plan:   -Admit to [facility], 9.39  -Routine observation q15 checks, No CO needed in a locked, supervised setting  -Psychiatry:   c/w Depakote ER 1000mg at bedtime for Bipolar d/o, increase Seroquel to 75 mg at bedtime for Mood/psychosis  diphenhydrAMINE 50 milliGRAM(s) Oral every 6 hours PRN agitation  diphenhydrAMINE Injectable 50 milliGRAM(s) IntraMuscular once PRN agitation  haloperidol     Tablet 2 milliGRAM(s) Oral every 6 hours PRN agitation  haloperidol    Injectable 2 milliGRAM(s) IntraMuscular Once PRN agitation  LORazepam     Tablet 1 milliGRAM(s) Oral every 6 hours PRN Agitation  LORazepam   Injectable 1 milliGRAM(s) IntraMuscular Once PRN Agitation  -Medical: Dry mouth; Biotene ordered   -Group and milieu therapy   -Dispo as per social work

## 2023-09-16 NOTE — BH INPATIENT PSYCHIATRY PROGRESS NOTE - NSBHCHARTREVIEWVS_PSY_A_CORE FT
Vital Signs Last 24 Hrs  T(C): --  T(F): --  HR: --  BP: --  BP(mean): --  RR: --  SpO2: --    Orthostatic VS  09-15-23 @ 08:03  Lying BP: --/-- HR: --  Sitting BP: 144/77 HR: 87  Standing BP: 138/73 HR: 92  Site: --  Mode: --

## 2023-09-17 PROCEDURE — 99232 SBSQ HOSP IP/OBS MODERATE 35: CPT

## 2023-09-17 RX ADMIN — Medication 650 MILLIGRAM(S): at 13:47

## 2023-09-17 RX ADMIN — Medication 650 MILLIGRAM(S): at 13:17

## 2023-09-17 RX ADMIN — DIVALPROEX SODIUM 1000 MILLIGRAM(S): 500 TABLET, DELAYED RELEASE ORAL at 20:30

## 2023-09-17 RX ADMIN — Medication 3 MILLIGRAM(S): at 20:31

## 2023-09-17 NOTE — BH INPATIENT PSYCHIATRY PROGRESS NOTE - CURRENT MEDICATION
Quality 110: Preventive Care And Screening: Influenza Immunization: Influenza Immunization Administered during Influenza season Quality 111:Pneumonia Vaccination Status For Older Adults: Pneumococcal Vaccination Previously Received Detail Level: Detailed Quality 226: Preventive Care And Screening: Tobacco Use: Screening And Cessation Intervention: Patient screened for tobacco use and is an ex/non-smoker MEDICATIONS  (STANDING):  Biotene Dry Mouth Oral Rinse 15 milliLiter(s) Swish and Spit three times a day  divalproex ER 1000 milliGRAM(s) Oral at bedtime  melatonin. 3 milliGRAM(s) Oral at bedtime  QUEtiapine 75 milliGRAM(s) Oral at bedtime    MEDICATIONS  (PRN):  acetaminophen     Tablet .. 650 milliGRAM(s) Oral every 6 hours PRN Temp greater or equal to 38C (100.4F), Mild Pain (1 - 3), Moderate Pain (4 - 6), Severe Pain (7 - 10)  aluminum hydroxide/magnesium hydroxide/simethicone Suspension 30 milliLiter(s) Oral every 6 hours PRN Dyspepsia  diphenhydrAMINE 50 milliGRAM(s) Oral every 6 hours PRN agitation/EPS  diphenhydrAMINE Injectable 50 milliGRAM(s) IntraMuscular once PRN agitation  haloperidol     Tablet 2 milliGRAM(s) Oral every 6 hours PRN agitation  haloperidol    Injectable 2 milliGRAM(s) IntraMuscular Once PRN agitation  LORazepam     Tablet 1 milliGRAM(s) Oral every 6 hours PRN Agitation  LORazepam   Injectable 1 milliGRAM(s) IntraMuscular Once PRN Agitation  magnesium hydroxide Suspension 30 milliLiter(s) Oral daily PRN Constipation

## 2023-09-17 NOTE — BH INPATIENT PSYCHIATRY PROGRESS NOTE - NSBHCHARTREVIEWVS_PSY_A_CORE FT
Vital Signs Last 24 Hrs  T(C): 37.2 (09-17-23 @ 08:54), Max: 37.2 (09-17-23 @ 08:54)  T(F): 99 (09-17-23 @ 08:54), Max: 99 (09-17-23 @ 08:54)  HR: --  BP: --  BP(mean): --  RR: --  SpO2: --    Orthostatic VS  09-17-23 @ 08:54  Lying BP: --/-- HR: --  Sitting BP: 154/72 HR: 104  Standing BP: 140/81 HR: 116  Site: --  Mode: --

## 2023-09-17 NOTE — BH INPATIENT PSYCHIATRY PROGRESS NOTE - NSBHMETABOLIC_PSY_ALL_CORE_FT
BMI: BMI (kg/m2): 29.3 (09-14-23 @ 00:56)  HbA1c: A1C with Estimated Average Glucose Result: 5.5 % (08-25-23 @ 13:32)    Glucose: POCT Blood Glucose.: 105 mg/dL (09-13-23 @ 23:21)    BP: --  Lipid Panel: Date/Time: 05-26-23 @ 13:30  Cholesterol, Serum: 159  Direct LDL: --  HDL Cholesterol, Serum: 33  Total Cholesterol/HDL Ration Measurement: --  Triglycerides, Serum: 197

## 2023-09-17 NOTE — BH INPATIENT PSYCHIATRY PROGRESS NOTE - NSBHASSESSSUMMFT_PSY_ALL_CORE
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 , no known suicide attempt or violence hx, no known legal hx, no known substance abuse history who was brought in by EMS for concerns for suicidal ideation and bipolar decompensation.    Differential Diagnosis: Bipolar Disorder, Bipolar disorder with psychotic features, Schizoaffective disorder    On assessment,  patient remains  paranoid, delusional, guarded, denied AH, SIIP currently.     Plan:   -Admit to [facility], 9.39  -Routine observation q15 checks, No CO needed in a locked, supervised setting, taking meds.   -Psychiatry:   c/w Depakote ER 1000mg at bedtime for Bipolar d/o, increase Seroquel to 75 mg at bedtime for Mood/psychosis  diphenhydrAMINE 50 milliGRAM(s) Oral every 6 hours PRN agitation  diphenhydrAMINE Injectable 50 milliGRAM(s) IntraMuscular once PRN agitation  haloperidol     Tablet 2 milliGRAM(s) Oral every 6 hours PRN agitation  haloperidol    Injectable 2 milliGRAM(s) IntraMuscular Once PRN agitation  LORazepam     Tablet 1 milliGRAM(s) Oral every 6 hours PRN Agitation  LORazepam   Injectable 1 milliGRAM(s) IntraMuscular Once PRN Agitation  -Medical: Dry mouth; Biotene ordered   -Group and milieu therapy   -Dispo as per social work

## 2023-09-17 NOTE — BH INPATIENT PSYCHIATRY PROGRESS NOTE - NSBHFUPINTERVALHXFT_PSY_A_CORE
f/up bipolar d/o, vitals-slight orthostasis-asymptomatic,  Patient is oddly related, suspicious, reported sleeping well and with fair appetite.  Patient denied dizziness or constipation. Denied SI/I/P.  denied SE from meds. Patient focused on getting discharged.

## 2023-09-18 PROCEDURE — 99232 SBSQ HOSP IP/OBS MODERATE 35: CPT

## 2023-09-18 RX ORDER — QUETIAPINE FUMARATE 200 MG/1
50 TABLET, FILM COATED ORAL
Refills: 0 | Status: DISCONTINUED | OUTPATIENT
Start: 2023-09-18 | End: 2023-09-28

## 2023-09-18 RX ADMIN — QUETIAPINE FUMARATE 50 MILLIGRAM(S): 200 TABLET, FILM COATED ORAL at 21:20

## 2023-09-18 RX ADMIN — DIVALPROEX SODIUM 1000 MILLIGRAM(S): 500 TABLET, DELAYED RELEASE ORAL at 21:21

## 2023-09-18 RX ADMIN — Medication 3 MILLIGRAM(S): at 21:21

## 2023-09-18 NOTE — BH INPATIENT PSYCHIATRY PROGRESS NOTE - NSBHASSESSSUMMFT_PSY_ALL_CORE
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 , no known suicide attempt or violence hx, no known legal hx, no known substance abuse history who was brought in by EMS for concerns for suicidal ideation and bipolar decompensation.    Differential Diagnosis: Bipolar Disorder, Bipolar disorder with psychotic features, Schizoaffective disorder    On assessment, patient remains paranoid, delusional, guarded, denied AVH, SIIP, HI currently.     Plan:   -Admit to [facility], 9.39  -Routine observation q15 checks, No CO needed in a locked, supervised setting, taking meds.   -Psychiatry:   c/w Depakote ER 1000mg at bedtime for Bipolar d/o, increase Seroquel to 50 mg BID for Mood/psychosis  diphenhydrAMINE 50 milliGRAM(s) Oral every 6 hours PRN agitation  diphenhydrAMINE Injectable 50 milliGRAM(s) IntraMuscular once PRN agitation  haloperidol     Tablet 2 milliGRAM(s) Oral every 6 hours PRN agitation  haloperidol    Injectable 2 milliGRAM(s) IntraMuscular Once PRN agitation  LORazepam     Tablet 1 milliGRAM(s) Oral every 6 hours PRN Agitation  LORazepam   Injectable 1 milliGRAM(s) IntraMuscular Once PRN Agitation  -Medical: Dry mouth; Biotene ordered   -Group and milieu therapy   -Dispo as per social work

## 2023-09-18 NOTE — BH INPATIENT PSYCHIATRY PROGRESS NOTE - NSBHCHARTREVIEWVS_PSY_A_CORE FT
Vital Signs Last 24 Hrs  T(C): 36.4 (09-18-23 @ 08:54), Max: 36.4 (09-18-23 @ 08:54)  T(F): 97.5 (09-18-23 @ 08:54), Max: 97.5 (09-18-23 @ 08:54)  HR: --  BP: --  BP(mean): --  RR: 17 (09-18-23 @ 08:54) (17 - 17)  SpO2: --    Orthostatic VS  09-18-23 @ 08:54  Lying BP: --/-- HR: --  Sitting BP: 155/86 HR: 87  Standing BP: 155/94 HR: 98  Site: --  Mode: --  Orthostatic VS  09-17-23 @ 08:54  Lying BP: --/-- HR: --  Sitting BP: 154/72 HR: 104  Standing BP: 140/81 HR: 116  Site: --  Mode: --

## 2023-09-18 NOTE — BH INPATIENT PSYCHIATRY PROGRESS NOTE - CURRENT MEDICATION
MEDICATIONS  (STANDING):  Biotene Dry Mouth Oral Rinse 15 milliLiter(s) Swish and Spit three times a day  divalproex ER 1000 milliGRAM(s) Oral at bedtime  melatonin. 3 milliGRAM(s) Oral at bedtime  QUEtiapine 50 milliGRAM(s) Oral two times a day    MEDICATIONS  (PRN):  acetaminophen     Tablet .. 650 milliGRAM(s) Oral every 6 hours PRN Temp greater or equal to 38C (100.4F), Mild Pain (1 - 3), Moderate Pain (4 - 6), Severe Pain (7 - 10)  aluminum hydroxide/magnesium hydroxide/simethicone Suspension 30 milliLiter(s) Oral every 6 hours PRN Dyspepsia  diphenhydrAMINE 50 milliGRAM(s) Oral every 6 hours PRN agitation/EPS  diphenhydrAMINE Injectable 50 milliGRAM(s) IntraMuscular once PRN agitation  haloperidol     Tablet 2 milliGRAM(s) Oral every 6 hours PRN agitation  haloperidol    Injectable 2 milliGRAM(s) IntraMuscular Once PRN agitation  LORazepam     Tablet 1 milliGRAM(s) Oral every 6 hours PRN Agitation  LORazepam   Injectable 1 milliGRAM(s) IntraMuscular Once PRN Agitation  magnesium hydroxide Suspension 30 milliLiter(s) Oral daily PRN Constipation

## 2023-09-18 NOTE — BH INPATIENT PSYCHIATRY PROGRESS NOTE - ATTENDING COMMENTS
Care was discussed and reviewed in interdisciplinary treatment team.  I, Veronika Gonzalez MD, have reviewed and verified the documentation.  I independently performed the documented medical decision making.     NP informed that she has communicated with patient's daughter that is supportive of the patient been in the hospital. Given that the patient remains confused and paranoid we will ask Neurology to visit with the patient.

## 2023-09-18 NOTE — BH PSYCHOLOGY - GROUP THERAPY NOTE - NSPSYCHOLGRPGENPT_PSY_A_CORE FT
Patients attended women's process group led by Psychology Extern. Group began with a brief check- discussion about the purpose of process group, guidelines for group, and sharing about one’s emotional state coming into group. Group members were guided through discussions of their experiences on the unit so far, with particular focus on the feelings of stress, guilt and overwhelm that can occur when leaving one’s support system temporarily in order to receive care in the hospital. Members expressed their unique circumstances and engaged in supportive dialogue with one another around shared emotions and experiences. Pts discussed the challenges in trusting others to take on responsibilities normally held by them while hospitalized, and the benefit of learning to accept that while others may not care for others the way they might, that others in their life can be trusted to help and support.  Group also discussed the importance of self-care, and self-compassion and leaning into “being good enough” rather than expecting perfection in every aspect of their lives. Pts explored the barriers to self-compassion and self-care which included feeling that there wasn’t enough time in the day to care for others and self, being stuck in routine of being harsh with oneself, and feelings of unworthiness of care/kindness stemming from early life experiences. Group leaders also provided perspective to reframe some unhelpful thoughts and provided trauma informed care where appropriate.

## 2023-09-18 NOTE — BH INPATIENT PSYCHIATRY PROGRESS NOTE - NSBHFUPINTERVALHXFT_PSY_A_CORE
Patient was seen for f/u for psychosis. Chart reviewed and case discussed in team meeting, VSS. Patient was irritable, requesting CT scan, requesting to be discharged. Discussed with patient CT scan scheduled last Thursday which she refused. Patient questions why she needs the CT scan, patient was informed of what she reported in the ED, patient denied informing ED staff about car crashed, denied being in any car crashes. Patient continued to insist on getting the CT scan even though she doesn't believe she needs it. Patient was discharge focused and team has concern for elopement. CT scan will be scheduled at a later date. Patient remains disorganized, guarded, paranoid, AWOL risk. Patient denied SIIP, AVH, HI. Patient tolerating medication. Denied any side effects, including EPS, TD, constipation.      Collateral Roz[place] Daughter- Confirmed patient has not been in any accidents. Patient has not been at baseline since lithium toxicity in July. Dr Lambert on [facility] had concerns for Silent syndrome.

## 2023-09-18 NOTE — BH PSYCHOLOGY - GROUP THERAPY NOTE - TOKEN PULL-DIAGNOSIS
Primary Diagnosis:  Bipolar 1 disorder [F31.9]        Problem Dx:   Bipolar 1 disorder [F31.9]

## 2023-09-18 NOTE — BH PSYCHOLOGY - GROUP THERAPY NOTE - NSPSYCHOLGRPGENGOAL_PSY_A_CORE
improve family functioning/improve level of independent functioning/improve social/vocational/coping skills/psychoeducation

## 2023-09-18 NOTE — BH PSYCHOLOGY - GROUP THERAPY NOTE - NSBHPSYCHOLPARTICIPCOMMENT_PSY_A_CORE FT
Pt was alert, attentive and engaged at start of group discussing challenges with sleep. Pt was respectful of others, but was mostly quiet. When sharing, thought processes appeared disorganized and somewhat tangential (e.g. noting " I am close to senior living, I just want to stop working and garden around my house". Pt left group after 25 minutes.

## 2023-09-19 PROCEDURE — 99232 SBSQ HOSP IP/OBS MODERATE 35: CPT

## 2023-09-19 RX ADMIN — DIVALPROEX SODIUM 1000 MILLIGRAM(S): 500 TABLET, DELAYED RELEASE ORAL at 20:42

## 2023-09-19 RX ADMIN — QUETIAPINE FUMARATE 50 MILLIGRAM(S): 200 TABLET, FILM COATED ORAL at 20:42

## 2023-09-19 RX ADMIN — Medication 15 MILLILITER(S): at 12:08

## 2023-09-19 RX ADMIN — QUETIAPINE FUMARATE 50 MILLIGRAM(S): 200 TABLET, FILM COATED ORAL at 09:12

## 2023-09-19 RX ADMIN — Medication 3 MILLIGRAM(S): at 20:42

## 2023-09-19 NOTE — BH INPATIENT PSYCHIATRY PROGRESS NOTE - NSBHFUPINTERVALHXFT_PSY_A_CORE
Patient was seen for f/u for psychosis. Chart reviewed and case discussed in team meeting, VSS. Patient presented as disorganized, confused, lacking focus, unable to engage in conversation for more than a few minutes. Patient presented as calmer today, less irritable, less agitated, more cooperative. Patient would walk away mid conversation. Patient became happy when informed her treatment was discussed with her daughter, Rozina. Patient reported she will not agree to anything unless her daughter is informed and agrees. Patient remains discharge focused and team has concern for elopement.  Patient denied SIIP, AVH, HI. Patient tolerating medication, increased Seroquel to 50mg BID. Denied any side effects, including EPS, TD, constipation.

## 2023-09-19 NOTE — BH INPATIENT PSYCHIATRY PROGRESS NOTE - NSBHCHARTREVIEWVS_PSY_A_CORE FT
Vital Signs Last 24 Hrs  T(C): 36.9 (09-19-23 @ 07:59), Max: 36.9 (09-19-23 @ 07:59)  T(F): 98.4 (09-19-23 @ 07:59), Max: 98.4 (09-19-23 @ 07:59)  HR: --  BP: --  BP(mean): --  RR: 20 (09-19-23 @ 07:59) (20 - 20)  SpO2: --    Orthostatic VS  09-19-23 @ 07:59  Lying BP: --/-- HR: --  Sitting BP: 150/74 HR: 106  Standing BP: 144/87 HR: 112  Site: --  Mode: --  Orthostatic VS  09-18-23 @ 08:54  Lying BP: --/-- HR: --  Sitting BP: 155/86 HR: 87  Standing BP: 155/94 HR: 98  Site: --  Mode: --   Vital Signs Last 24 Hrs  T(C): 36.9 (09-19-23 @ 07:59), Max: 36.9 (09-19-23 @ 07:59)  T(F): 98.4 (09-19-23 @ 07:59), Max: 98.4 (09-19-23 @ 07:59)  HR: 98 (09-19-23 @ 13:20) (98 - 98)  BP: 140/81 (09-19-23 @ 13:20) (140/81 - 140/81)  BP(mean): --  RR: 20 (09-19-23 @ 07:59) (20 - 20)  SpO2: --    Orthostatic VS  09-19-23 @ 07:59  Lying BP: --/-- HR: --  Sitting BP: 150/74 HR: 106  Standing BP: 144/87 HR: 112  Site: --  Mode: --  Orthostatic VS  09-18-23 @ 08:54  Lying BP: --/-- HR: --  Sitting BP: 155/86 HR: 87  Standing BP: 155/94 HR: 98  Site: --  Mode: --

## 2023-09-19 NOTE — BH INPATIENT PSYCHIATRY PROGRESS NOTE - NSBHASSESSSUMMFT_PSY_ALL_CORE
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 , no known suicide attempt or violence hx, no known legal hx, no known substance abuse history who was brought in by EMS for concerns for suicidal ideation and bipolar decompensation.    Differential Diagnosis: Bipolar Disorder, Bipolar disorder with psychotic features, Schizoaffective disorder    On assessment, patient remains disorganized, paranoid, delusional, guarded, confused, was less irritable, calmer, denied AVH, SIIP, HI currently.     Plan:   -Admit to [facility], 9.39  -Routine observation q15 checks, No CO needed in a locked, supervised setting, taking meds.   -Psychiatry:   c/w Depakote ER 1000mg at bedtime for Bipolar d/o, increase Seroquel to 50 mg BID for Mood/psychosis  diphenhydrAMINE 50 milliGRAM(s) Oral every 6 hours PRN agitation  diphenhydrAMINE Injectable 50 milliGRAM(s) IntraMuscular once PRN agitation  haloperidol     Tablet 2 milliGRAM(s) Oral every 6 hours PRN agitation  haloperidol    Injectable 2 milliGRAM(s) IntraMuscular Once PRN agitation  LORazepam     Tablet 1 milliGRAM(s) Oral every 6 hours PRN Agitation  LORazepam   Injectable 1 milliGRAM(s) IntraMuscular Once PRN Agitation  -Medical: Dry mouth; Biotene ordered   -Group and milieu therapy   -Dispo as per social work

## 2023-09-19 NOTE — BH INPATIENT PSYCHIATRY PROGRESS NOTE - NSBHMETABOLIC_PSY_ALL_CORE_FT
BMI: BMI (kg/m2): 29.3 (09-14-23 @ 00:56)  HbA1c: A1C with Estimated Average Glucose Result: 5.5 % (08-25-23 @ 13:32)    Glucose: POCT Blood Glucose.: 105 mg/dL (09-13-23 @ 23:21)    BP: --  Lipid Panel: Date/Time: 05-26-23 @ 13:30  Cholesterol, Serum: 159  Direct LDL: --  HDL Cholesterol, Serum: 33  Total Cholesterol/HDL Ration Measurement: --  Triglycerides, Serum: 197   BMI: BMI (kg/m2): 29.3 (09-14-23 @ 00:56)  HbA1c: A1C with Estimated Average Glucose Result: 5.5 % (08-25-23 @ 13:32)    Glucose: POCT Blood Glucose.: 105 mg/dL (09-13-23 @ 23:21)    BP: 140/81 (09-19-23 @ 13:20) (140/81 - 140/81)  Lipid Panel: Date/Time: 05-26-23 @ 13:30  Cholesterol, Serum: 159  Direct LDL: --  HDL Cholesterol, Serum: 33  Total Cholesterol/HDL Ration Measurement: --  Triglycerides, Serum: 197

## 2023-09-19 NOTE — BH INPATIENT PSYCHIATRY PROGRESS NOTE - ATTENDING COMMENTS
Care was discussed and reviewed in interdisciplinary treatment team.  I, Veronika Gonzalez MD, have reviewed and verified the documentation.  I independently performed the documented medical decision making.     NP informed that she has communicated with patient's daughter that is supportive of the patient been in the hospital. Given that the patient remains confused and paranoid we will ask Neurology to visit with the patient.  Care was discussed and reviewed in interdisciplinary treatment team.  I, Veronika Gonzalez MD, have reviewed and verified the documentation.  I independently performed the documented medical decision making.     Patient remains paranoid, disorganized and confused. Patient has significant cognitive problems and is not able to effectively process information. Patient is focus on leaving the hospital and has poor understanding into her psychiatric needs.

## 2023-09-20 PROCEDURE — 99232 SBSQ HOSP IP/OBS MODERATE 35: CPT

## 2023-09-20 RX ADMIN — DIVALPROEX SODIUM 1000 MILLIGRAM(S): 500 TABLET, DELAYED RELEASE ORAL at 21:05

## 2023-09-20 RX ADMIN — Medication 15 MILLILITER(S): at 09:06

## 2023-09-20 RX ADMIN — Medication 3 MILLIGRAM(S): at 21:05

## 2023-09-20 RX ADMIN — QUETIAPINE FUMARATE 50 MILLIGRAM(S): 200 TABLET, FILM COATED ORAL at 09:04

## 2023-09-20 RX ADMIN — QUETIAPINE FUMARATE 50 MILLIGRAM(S): 200 TABLET, FILM COATED ORAL at 21:05

## 2023-09-20 NOTE — BH INPATIENT PSYCHIATRY PROGRESS NOTE - ATTENDING COMMENTS
Care was discussed and reviewed in interdisciplinary treatment team.  I, Veronika Gonzalez MD, have reviewed and verified the documentation.  I independently performed the documented medical decision making.     Patient remains paranoid, disorganized and confused. Patient has significant cognitive problems and is not able to effectively process information. Patient is focus on leaving the hospital and has poor understanding into her psychiatric needs.  Care was discussed and reviewed in interdisciplinary treatment team.  I, Veronika Gonzalez MD, have reviewed and verified the documentation.  I independently performed the documented medical decision making.     Patient is experiencing significant cognitive problems and she is not are her baseline. Patient has been evaluated by Neurology that has recommended an MRI. Patient is unaware of her deficits and is discharge focus. Once the patient is mor psychiatrically stable we will coordinate for the patient to have a Neuropsychiatric assessment.  Care was discussed and reviewed in interdisciplinary treatment team.  I, Veronika Gonzalez MD, have reviewed and verified the documentation.  I independently performed the documented medical decision making.     Patient is experiencing significant cognitive problems and she is not are her baseline. Patient score 21/30 in the mini mental. Patient has been evaluated by Neurology that has recommended an MRI. They think that the patient is experiencing problems related to the lithium toxicity. Patient is unaware of her deficits and is discharge focus. Once the patient is mor psychiatrically stable we will coordinate for the patient to have a Neuropsychiatric assessment. At this time patient is unable to safely care for her self.

## 2023-09-20 NOTE — BH INPATIENT PSYCHIATRY PROGRESS NOTE - NSBHFUPINTERVALHXFT_PSY_A_CORE
Patient was seen for f/u for psychosis. Chart reviewed and case discussed in team meeting, VSS. Patient presented as disorganized, confused, lacking focus, unable to engage in conversation for more than a few minutes. Patient was able to engage better today, completed mini mental. Neurology consult ordered and handoff provided, recommended MRI. Patient was paranoid about neurology team and initially refused to engage with then, was later able to sit and allow assessment. Patient remains discharge focused. Patient denied SIIP, AVH, HI. Patient tolerating medication, Seroquel 50mg BID. Complained of some sedation early, was better later on the day. Denied any side effects, including EPS, TD, constipation.

## 2023-09-20 NOTE — BH INPATIENT PSYCHIATRY PROGRESS NOTE - NSBHASSESSSUMMFT_PSY_ALL_CORE
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 , no known suicide attempt or violence hx, no known legal hx, no known substance abuse history who was brought in by EMS for concerns for suicidal ideation and bipolar decompensation.    Differential Diagnosis: Bipolar Disorder, Bipolar disorder with psychotic features, Schizoaffective disorder    On assessment, patient remains disorganized, paranoid, delusional, guarded, confused, calmer, better able to engage, denied AVH, SIIP, HI currently.     Plan:   -Admit to [facility], 9.39  -Routine observation q15 checks, No CO needed in a locked, supervised setting, taking meds.   -Psychiatry:   c/w Depakote ER 1000mg at bedtime for Bipolar d/o, c/w Seroquel to 50 mg BID for Mood/psychosis  diphenhydrAMINE 50 milliGRAM(s) Oral every 6 hours PRN agitation  diphenhydrAMINE Injectable 50 milliGRAM(s) IntraMuscular once PRN agitation  haloperidol     Tablet 2 milliGRAM(s) Oral every 6 hours PRN agitation  haloperidol    Injectable 2 milliGRAM(s) IntraMuscular Once PRN agitation  LORazepam     Tablet 1 milliGRAM(s) Oral every 6 hours PRN Agitation  LORazepam   Injectable 1 milliGRAM(s) IntraMuscular Once PRN Agitation  -Medical: Dry mouth; Biotene ordered   -Group and milieu therapy   -Dispo as per social work

## 2023-09-20 NOTE — BH INPATIENT PSYCHIATRY PROGRESS NOTE - NSBHCHARTREVIEWVS_PSY_A_CORE FT
Vital Signs Last 24 Hrs  T(C): 37 (09-20-23 @ 07:35), Max: 37 (09-20-23 @ 07:35)  T(F): 98.6 (09-20-23 @ 07:35), Max: 98.6 (09-20-23 @ 07:35)  HR: --  BP: --  BP(mean): --  RR: 19 (09-20-23 @ 07:35) (19 - 19)  SpO2: 99% (09-20-23 @ 07:35) (99% - 99%)    Orthostatic VS  09-20-23 @ 07:35  Lying BP: --/-- HR: --  Sitting BP: 166/90 HR: 91  Standing BP: 164/79 HR: 95  Site: --  Mode: --  Orthostatic VS  09-19-23 @ 07:59  Lying BP: --/-- HR: --  Sitting BP: 150/74 HR: 106  Standing BP: 144/87 HR: 112  Site: --  Mode: --

## 2023-09-20 NOTE — BH INPATIENT PSYCHIATRY PROGRESS NOTE - NSBHMETABOLIC_PSY_ALL_CORE_FT
BMI: BMI (kg/m2): 29.3 (09-14-23 @ 00:56)  HbA1c: A1C with Estimated Average Glucose Result: 5.5 % (08-25-23 @ 13:32)    Glucose: POCT Blood Glucose.: 105 mg/dL (09-13-23 @ 23:21)    BP: 140/81 (09-19-23 @ 13:20) (140/81 - 140/81)  Lipid Panel: Date/Time: 05-26-23 @ 13:30  Cholesterol, Serum: 159  Direct LDL: --  HDL Cholesterol, Serum: 33  Total Cholesterol/HDL Ration Measurement: --  Triglycerides, Serum: 197

## 2023-09-20 NOTE — CHART NOTE - NSCHARTNOTEFT_GEN_A_CORE
Patient interviewed and examined with Dr. John and MS Floyd. Case discussed with Cathy Wynn NP and Dr. Gonzalez. Full consult note to follow.    Brennan Zee M.D.  [id number]  [facility] License # [id number]  NPI # [id number]

## 2023-09-21 PROCEDURE — 99232 SBSQ HOSP IP/OBS MODERATE 35: CPT

## 2023-09-21 RX ADMIN — Medication 3 MILLIGRAM(S): at 20:45

## 2023-09-21 RX ADMIN — QUETIAPINE FUMARATE 50 MILLIGRAM(S): 200 TABLET, FILM COATED ORAL at 08:56

## 2023-09-21 RX ADMIN — QUETIAPINE FUMARATE 50 MILLIGRAM(S): 200 TABLET, FILM COATED ORAL at 20:46

## 2023-09-21 RX ADMIN — DIVALPROEX SODIUM 1000 MILLIGRAM(S): 500 TABLET, DELAYED RELEASE ORAL at 20:45

## 2023-09-21 NOTE — BH INPATIENT PSYCHIATRY PROGRESS NOTE - NSBHASSESSSUMMFT_PSY_ALL_CORE
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 , no known suicide attempt or violence hx, no known legal hx, no known substance abuse history who was brought in by EMS for concerns for suicidal ideation and bipolar decompensation.    Differential Diagnosis: Bipolar Disorder, Bipolar disorder with psychotic features, Schizoaffective disorder    On assessment, patient remains disorganized, paranoid, delusional, guarded, confused, calmer, better able to engage, denied AVH, SIIP, HI currently.     Plan:   -Admit to [facility], 9.39  -Routine observation q15 checks, No CO needed in a locked, supervised setting, taking meds.   -Psychiatry:   c/w Depakote ER 1000mg at bedtime for Bipolar d/o, c/w Seroquel to 50 mg BID for Mood/psychosis  diphenhydrAMINE 50 milliGRAM(s) Oral every 6 hours PRN agitation  diphenhydrAMINE Injectable 50 milliGRAM(s) IntraMuscular once PRN agitation  haloperidol     Tablet 2 milliGRAM(s) Oral every 6 hours PRN agitation  haloperidol    Injectable 2 milliGRAM(s) IntraMuscular Once PRN agitation  LORazepam     Tablet 1 milliGRAM(s) Oral every 6 hours PRN Agitation  LORazepam   Injectable 1 milliGRAM(s) IntraMuscular Once PRN Agitation  -Medical: Dry mouth; Biotene ordered   -Group and milieu therapy   -Dispo as per social work

## 2023-09-21 NOTE — CHART NOTE - NSCHARTNOTEFT_GEN_A_CORE
Referring Clinician: Cathy Wynn NP  	  Source of information: Patient, Dr. Gonzalez, Cathy Wynn, BLAYNE, Rozina Flores (Daughter)    Reason for Consult: Investigation for neurological etiology of cognitive deficits in context of recent severe lithium toxicity.    History of Present Illness: The patient is a 58-year-old woman with a past psychiatric history of bipolar disorder and three inpatient hospitalizations ([address] [place] [address], [facility] 2016, [facility] 2023), no past medical history, and no past surgical history, who presented to [facility] ED on 2023 for SI and concerns for bipolar decompensation. Of note, the patient was recently discharged from  after a week of inpatient hospitalization on a voluntary basis, where there was concern for SILENT syndrome given enduring cognitive deficits after lithium toxicity to a peak level of 5.2 on 2023.    Since being admitted,¬¬¬ patient has been disorganized and paranoid on the unit. The patient has only required one dose of acetaminophen 650mg PO on 23 while on the unit. Neurology was consulted to evaluate the patient a potential neurological etiology of the patient’s cognitive deficits in context of recent severe lithium toxicity in 2023.     Upon initial interview, patient states she is unaware of why she is in the hospital but is able to endorse that she is in [facility] for psychiatric care. She questions why the neurology team has to see her given she is at [facility] for psychiatric care and states that she is “paranoid”. The patient was largely cooperative with interview, however initially denied further examination during neurological exam. Later, the patient was amenable to neurological examination.    Collateral history provided by the patient’s daughter, Rozina Flores. She reports that prior to the patient’s lithium toxicity in 2023, the patient was fully functional and worked as a lead transporter in the [employer]. She states the patient required ICU admission and dialysis for her lithium toxicity, and that she initially required a walker after the patient was downgraded to inpatient medicine floors from the ICU, after which the patient’s gait functionality quickly recovered. She notes the patient never recovered to her baseline cognitive status, consistently exhibiting symptoms of montserrat and disorganization up until the present. She states she has not noted any abnormal neurological findings while at home (did not note tremors or gait imbalance while patient was at home).    Of note, the patient had an MMSE score of 21/30 recorded on the unit earlier today.    Allergies: Allergy to penicillin/amoxicillin    Current Medications:  Standing:   -	quetiapine 50mg PO BID  -	melatonin 3mg PO qHS  -	biotene dry mouth oral rinse 15 mL swish and spit TID  -	divalproex ER 1000 mg PO qHS    PRN:  -	lorazepam 1mg PO q6 hours PRN for agitation  -	lorazepam 1mg IM once PRN for agitation  -	acetaminophen 650mg PO q6 hours PRN for fever or pain  -	aluminum hydroxide/magnesium hydroxide/simethicone suspension 30 mL PO q6 hours PRN for dyspepsia  -	diphenhydramine 50mg PO q6 hours PRN for agitation/EPS  -	haloperidol 2mg PO q6 hours PRN for agitation  -	magnesium hydroxide suspension 30mL PO daily PRN for constipation  -	diphenhydramine injectable 50mg IM once PRN for agitation  -	haloperidol 2mg IM once PRN for agitation    Past Medical History: No known past medical history    Past Surgical History: No known past surgical history    Past Psychiatric History:  -	Charted Diagnoses: Bipolar 1 disorder  -	Known Hospitalizations/Consultation Liaison Psychiatry Involvement: [address] [place] [address], [facility] 2016, [facility] 2023;  involvement from 23 to 23.  -	  Family History: None known (patient’s daughter denies family history of seizure disorders, brain malignancy, dementia, or headache disorders).    Social History: The patient currently lives in [facility] [place] [facility] with her daughter, grandson, and boyfriend. Prior to lithium toxicity, the patient was fully functional at home, however, has demonstrated significant cognitive deficits and impaired functional capacity after her lithium toxicity. The patient endorses previous remote use of cigarettes. Patient states highest level of education is 9th grade.    ROS:   Constitutional: Denies fevers, recent weight loss or gain, and night sweats. Endorses tiredness and chills.  HEENT: Denies headaches, changes in vision, and changes in hearing.  Respiratory: Denies shortness of breath and cough.  Cardiac: Denies chest pain and palpitations.  Gastrointestinal: Denies any change in bowel movement frequency. Denies any GI upset, nausea, and vomiting.  Genitourinary: Denies dysuria, hematuria, changes in urinary frequency, and incontinence.  Musculoskeletal: Denies muscle aches and joint pain. Endorses foot swelling.  Neuro: Denies weakness, paresthesia, gait imbalance, and dizziness.   Skin: Denies new rashes or lesions.     Physical Exam:  VS: T 98°F, sitting: HR 97 /84, standing: , /89  Gen: Well-developed, alert and in no acute distress.   HEENT: Normocephalic and atraumatic.  Neck: Supple with full range of motion.  CV: Mildly tachycardic. Normal S1/S2. No murmurs, rubs, or gallops.  Pulmonary: Clear to auscultation bilaterally.   Abdomen: Soft, nontender, and nondistended. Normoactive bowel sounds in all four quadrants.   Back: No spinal deviation noted. No spinous or costovertebral angle tenderness.  Ext: No edema of distal upper extremities. Distal lower extremities demonstrated bilateral edema with pretibial pitting.    Neurological Exam:  MSE:  Patient was overall alert. Patient was calm, cooperative, in good behavioral control. Noted to have increased latency and decreased production of speech.    MMSE: 22/30 based on spelling WORLD backwards.  Orientation:  -	Time:  (Correct: year, month, day, season Incorrect: date)  -	Location:  (Correct: hospital, city/town, county, state Incorrect: floor)  Registration: 3/3  Attention: 3/ (spell WORLD backwards); Refused to attempt serial 7’s  Recall: 0/3  Namin/2  Repetition: 1  3-Stage Command: 3/3  Written Instruction:   Written Sentence:   Copying intersecting pentagons: 0/1    MIS: 7/8 (recalls 3/4 words spontaneously, 1/4 words with category cues)    CN II – Pupils are equal, round, and reactive to light and accommodation. Peripheral fields intact bilaterally by confrontation. Disc margins sharp.  CN III, IV, VI – Extraocular movements intact without nystagmus.  CN V – V1-V3 intact to light touch.  CN VII – No facial asymmetry. Able to smile, raise eyebrows, close eyes against resistance, and puff cheeks against resistance symmetrically and bilaterally.  CN VIII – Hearing intact bilaterally to finger rub.  CN IX, X – Palate elevates symmetrically bilaterally. Uvula is midline.  CN XI – Shoulder shrug and head turn intact and symmetric bilaterally with good power.  CN XII – Tongue midline without deviation.     Motor: Normal bulk and tone. Strength 5/5 in upper and lower extremities.  No pronator drift or cogwheel rigidity bilaterally. No bradykinesia, tremors, asterixis, or myoclonus noted.     Sensory:  Temperature, light touch, and vibration sense intact in upper and lower extremities bilaterally.     Reflexes:   	B-radialis	Biceps	Triceps	Patellar	Ankle	Plantar  Right	2+	3+	2+	0+	2+	Flexor  Left	2+	3+	2+	1+	2+	Flexor    Coordination: Finger-nose-finger and heel-to-shin intact bilaterally with no dysmetria. There was no dysdiadochokinesia on rapid alternating movements. Finger and toe tapping rhythmic and symmetric.     Gait: Unassisted regular gait with narrow base, smooth stride, and slightly diminished arm swing bilaterally. She can stand on heels and toes. No Romberg sign. Postural reflexes intact by pull test.       Labs: (23) WBC 5.29, RBC 3.55 (low), Hgb 10.6 (low), Hct 34.1 (low), Platelets 220, Na 144, K 4.4, Cl 109 (elevated), CO2 23, BUN 17, Cr 1.16, Glucose low 69 (low), Ca 9.1, TSH 1.93    Imaging:   ACC: [id number] EXAM: CT BRAIN ORDERED BY: RAMÓN WILEY   PROCEDURE DATE: 2023  INTERPRETATION: CT HEAD  HEAD CT  INDICATIONS: AMS, 58-year-old female with a past medical  history of bipolar on lithium who presents emergency department for 1 week of  lethargy and fatigue.  TECHNIQUE:  HEAD CT:  Serial axial images were obtained from the skull base to the vertex without the use of intravenous contrast. Extensive motion artifact and suboptimal positioning.  COMPARISON EXAMINATION: Head CT 2016  FINDINGS:  VENTRICLES AND SULCI: Ventricles and sulci are unremarkable for patient age.  INTRA-AXIAL: No intracranial mass, acute hemorrhage, or midline shift is present.  EXTRA-AXIAL: No extra-axial fluid collection is present.  INTRACRANIAL HEMORRHAGE: None.    VISUALIZED SINUSES: No air-fluid levels are identified.  VISUALIZED MASTOIDS: Clear.  CALVARIUM: Intact.  MISCELLANEOUS: None.  SOFT TISSUES: Unremarkable.  BONES: Unremarkable.  IMPRESSION:  HEAD CT: No acute hemorrhage or midline shift.  Extensive motion artifact and suboptimal positioning.    --- End of Report ---    BARRERA ALEXANDER MD; Attending Radiologist  This document has been electronically signed. 2023 9:30PM    Assessment:  The patient is a 58-year-old woman with a past psychiatric history of bipolar disorder and three inpatient hospitalizations who presented to [facility] ED on 2023 for SI and concerns for bipolar decompensation. Of note, the patient was recently discharged from  after a week of inpatient hospitalization on a voluntary basis, where there was concern for SILENT syndrome given enduring cognitive deficits after lithium toxicity to a peak level of 5.2 on 2023. Neurological examination was significant for an MMSE score of 22/30. 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  Demyelination on MRI could provide supportive evidence for that diagnosis.  It is unclear to what extent her behavioral symptoms may be due to underlying bipolar disorder.    Recommendations:  1)	It would be judicious for the patient to receive an MRI of the head without contrast to investigate for demyelination secondary to severe lithium toxicity.  2)	Avoid re-exposure to lithium if feasible.    South John M.D.   [phone]    I performed a history and physical examination of the patient and discussed the management with Dr. John. I reviewed Dr. John’s note and agree with the documented findings and plan of care. Findings and recommendations discussed with Cathy Wynn NP.  Brennan Zee M.D.  [phone]  [facility] License # [id number]  NPI # [id number]

## 2023-09-21 NOTE — BH INPATIENT PSYCHIATRY PROGRESS NOTE - NSBHCHARTREVIEWVS_PSY_A_CORE FT
Vital Signs Last 24 Hrs  T(C): 36.7 (09-21-23 @ 07:14), Max: 36.7 (09-21-23 @ 07:14)  T(F): 98 (09-21-23 @ 07:14), Max: 98 (09-21-23 @ 07:14)  HR: --  BP: --  BP(mean): --  RR: 19 (09-21-23 @ 07:14) (19 - 19)  SpO2: 100% (09-21-23 @ 07:14) (100% - 100%)    Orthostatic VS  09-21-23 @ 07:14  Lying BP: --/-- HR: --  Sitting BP: 153/84 HR: 97  Standing BP: 147/89 HR: 100  Site: --  Mode: --  Orthostatic VS  09-20-23 @ 07:35  Lying BP: --/-- HR: --  Sitting BP: 166/90 HR: 91  Standing BP: 164/79 HR: 95  Site: --  Mode: --

## 2023-09-21 NOTE — BH INPATIENT PSYCHIATRY PROGRESS NOTE - NSBHFUPINTERVALHXFT_PSY_A_CORE
Patient was seen for f/u for psychosis. Chart reviewed and case discussed in team meeting, VSS. Patient remains disorganized, confused, lacking focus, however was able to was calmer, able to sit and engage in interview. Patient remains discharge focused, petitioned for discharge through [facility]. Patient could not remember neurology evaluation yesterday, thought it occurred today, then endorsed feeling confused. Patient had trouble answer questions, required having the question repeated to answer appropriately. Patient denied SIIP, AVH, HI. Patient tolerating medication, continue Seroquel 50mg BID. Denied any side effects, including EPS, TD, constipation.

## 2023-09-21 NOTE — BH INPATIENT PSYCHIATRY PROGRESS NOTE - ATTENDING COMMENTS
Care was discussed and reviewed in interdisciplinary treatment team.  I, Veronika Gonzalez MD, have reviewed and verified the documentation.  I independently performed the documented medical decision making.  Care was discussed and reviewed in interdisciplinary treatment team.  I, Veronika Gonzalez MD, have reviewed and verified the documentation.  I independently performed the documented medical decision making.     Patient admitted that she has been feeling confused and has problems understanding all the information. She seemed overwhelmed when we talk about the evaluation done by the Neurologist. She seemed to have poor understanding into her psychiatric illness and medical issues. I have educated the patient about her medications and the reasons why we would like for her to stay in the hospital. She is not in agreement with staying here voluntarily. Patient score 21/30 in the MMSE and this out of her baseline. At this time patient will need hel with medications administration, transportation, meal preparation and making sure she can make it for her appointments.

## 2023-09-22 LAB
ALBUMIN SERPL ELPH-MCNC: 4.3 G/DL — SIGNIFICANT CHANGE UP (ref 3.3–5)
ALP SERPL-CCNC: 58 U/L — SIGNIFICANT CHANGE UP (ref 40–120)
ALT FLD-CCNC: 13 U/L — SIGNIFICANT CHANGE UP (ref 4–33)
AMMONIA BLD-MCNC: 20 UMOL/L — SIGNIFICANT CHANGE UP (ref 11–55)
ANION GAP SERPL CALC-SCNC: 7 MMOL/L — SIGNIFICANT CHANGE UP (ref 7–14)
AST SERPL-CCNC: 13 U/L — SIGNIFICANT CHANGE UP (ref 4–32)
BILIRUB SERPL-MCNC: 0.2 MG/DL — SIGNIFICANT CHANGE UP (ref 0.2–1.2)
BUN SERPL-MCNC: 18 MG/DL — SIGNIFICANT CHANGE UP (ref 7–23)
CALCIUM SERPL-MCNC: 9.4 MG/DL — SIGNIFICANT CHANGE UP (ref 8.4–10.5)
CHLORIDE SERPL-SCNC: 108 MMOL/L — HIGH (ref 98–107)
CO2 SERPL-SCNC: 28 MMOL/L — SIGNIFICANT CHANGE UP (ref 22–31)
CREAT SERPL-MCNC: 1.15 MG/DL — SIGNIFICANT CHANGE UP (ref 0.5–1.3)
EGFR: 55 ML/MIN/1.73M2 — LOW
GLUCOSE SERPL-MCNC: 97 MG/DL — SIGNIFICANT CHANGE UP (ref 70–99)
POTASSIUM SERPL-MCNC: 4.6 MMOL/L — SIGNIFICANT CHANGE UP (ref 3.5–5.3)
POTASSIUM SERPL-SCNC: 4.6 MMOL/L — SIGNIFICANT CHANGE UP (ref 3.5–5.3)
PROT SERPL-MCNC: 7.4 G/DL — SIGNIFICANT CHANGE UP (ref 6–8.3)
SODIUM SERPL-SCNC: 143 MMOL/L — SIGNIFICANT CHANGE UP (ref 135–145)
VALPROATE SERPL-MCNC: 112.3 UG/ML — HIGH (ref 50–100)

## 2023-09-22 PROCEDURE — 99232 SBSQ HOSP IP/OBS MODERATE 35: CPT

## 2023-09-22 PROCEDURE — 70551 MRI BRAIN STEM W/O DYE: CPT | Mod: 26

## 2023-09-22 RX ADMIN — QUETIAPINE FUMARATE 50 MILLIGRAM(S): 200 TABLET, FILM COATED ORAL at 09:09

## 2023-09-22 RX ADMIN — Medication 3 MILLIGRAM(S): at 21:26

## 2023-09-22 RX ADMIN — Medication 15 MILLILITER(S): at 09:13

## 2023-09-22 RX ADMIN — QUETIAPINE FUMARATE 50 MILLIGRAM(S): 200 TABLET, FILM COATED ORAL at 21:30

## 2023-09-22 RX ADMIN — DIVALPROEX SODIUM 1000 MILLIGRAM(S): 500 TABLET, DELAYED RELEASE ORAL at 21:26

## 2023-09-22 NOTE — BH INPATIENT PSYCHIATRY PROGRESS NOTE - ATTENDING COMMENTS
Care was discussed and reviewed in interdisciplinary treatment team.  I, Veronika Gonzalez MD, have reviewed and verified the documentation.  I independently performed the documented medical decision making.  Care was discussed and reviewed in interdisciplinary treatment team.  I, Veronika Gonzalez MD, have reviewed and verified the documentation.  I independently performed the documented medical decision making.     Patient admitted feeling confused and not remembering the information provided to her. She doesn't want to stay in the hospital but admitted that she needs assistance at home. I discuss with the patient that we are trying to set up services at home for her, but the patient insisted that she needed to leave the hospital. She was very emotional and tearful. She stated that no one can understand how she is feeling. Patient has been updated about the Neurology eval and the plan for her to have an assessment by Neuropsych.

## 2023-09-22 NOTE — BH INPATIENT PSYCHIATRY PROGRESS NOTE - NSBHCHARTREVIEWVS_PSY_A_CORE FT
Vital Signs Last 24 Hrs  T(C): 36.8 (09-22-23 @ 07:22), Max: 36.8 (09-22-23 @ 07:22)  T(F): 98.2 (09-22-23 @ 07:22), Max: 98.2 (09-22-23 @ 07:22)  HR: --  BP: --  BP(mean): --  RR: 19 (09-22-23 @ 07:22) (19 - 19)  SpO2: 99% (09-22-23 @ 07:22) (99% - 99%)    Orthostatic VS  09-22-23 @ 07:22  Lying BP: --/-- HR: --  Sitting BP: 154/81 HR: 95  Standing BP: 144/87 HR: 100  Site: --  Mode: --  Orthostatic VS  09-21-23 @ 07:14  Lying BP: --/-- HR: --  Sitting BP: 153/84 HR: 97  Standing BP: 147/89 HR: 100  Site: --  Mode: --

## 2023-09-22 NOTE — BH INPATIENT PSYCHIATRY PROGRESS NOTE - NSBHASSESSSUMMFT_PSY_ALL_CORE
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, w/ PPHx of bipolar d/o followed in [facility] AOPD, w/ 5 prior psychiatric hospitalizations most recently discharged from [facility] 9/7/23 for montserrat (9.13 admission, described as irritability, sleep disturbances, racing thoughts, restlessness), as well as psychotic symptoms (reported to be non command auditory hallucinations, delusional ideas of reference). , no known suicide attempt or violence hx, no known legal hx, no known substance abuse history who was brought in by EMS for concerns for suicidal ideation and bipolar decompensation.    Differential Diagnosis: Bipolar Disorder, Bipolar disorder with psychotic features, Schizoaffective disorder    On assessment, patient remains disorganized, paranoid, delusional, guarded, confused, calmer, better able to engage, denied AVH, SIIP, HI currently.     Plan:   -Admit to [facility], 9.39  -Routine observation q15 checks, No CO needed in a locked, supervised setting, taking meds.   -Psychiatry:   c/w Depakote ER 1000mg at bedtime for Bipolar d/o, c/w Seroquel to 50 mg BID for Mood/psychosis  diphenhydrAMINE 50 milliGRAM(s) Oral every 6 hours PRN agitation  diphenhydrAMINE Injectable 50 milliGRAM(s) IntraMuscular once PRN agitation  haloperidol     Tablet 2 milliGRAM(s) Oral every 6 hours PRN agitation  haloperidol    Injectable 2 milliGRAM(s) IntraMuscular Once PRN agitation  LORazepam     Tablet 1 milliGRAM(s) Oral every 6 hours PRN Agitation  LORazepam   Injectable 1 milliGRAM(s) IntraMuscular Once PRN Agitation  -Medical: Dry mouth; Biotene ordered   -Group and milieu therapy   -Dispo as per social work

## 2023-09-22 NOTE — BH INPATIENT PSYCHIATRY PROGRESS NOTE - NSBHFUPINTERVALHXFT_PSY_A_CORE
Patient was seen for f/u for psychosis. Chart reviewed and case discussed in team meeting, VSS. Patient endorsed feeling confused and unable to keep information straight, was tearful when discussing concerns. Patient was able to sit for a few minutes and engage in interview however could not kepp information straight and became confused very easily. Patient remains discharge focused. Discussed MRI scheduled for today and the need for her cooperate. Patient was also informed of neuropsych testing scheduled for next Thursday. Patient was informed Daughter Rozina was informed of current plan. Patient denied SIIP, AVH, HI. Patient tolerating medication, continue Seroquel 50mg BID. Denied any side effects, including EPS, TD, constipation.       Patient was seen for f/u for psychosis. Chart reviewed and case discussed in team meeting, VSS. Patient endorsed feeling confused and unable to keep information straight, was tearful when discussing concerns. Patient was able to sit for a few minutes and engage in interview however could not keep information straight and became confused very easily. Patient remains discharge focused. Discussed MRI scheduled for today and the need for her cooperate. Patient was also informed of neuropsych testing scheduled for next Thursday. Patient was informed Daughter Rozina was informed of current plan. Patient denied SIIP, AVH, HI. Patient tolerating medication, continue Seroquel 50mg BID. Denied any side effects, including EPS, TD, constipation.

## 2023-09-23 RX ADMIN — Medication 3 MILLIGRAM(S): at 21:34

## 2023-09-23 RX ADMIN — QUETIAPINE FUMARATE 50 MILLIGRAM(S): 200 TABLET, FILM COATED ORAL at 21:35

## 2023-09-23 RX ADMIN — QUETIAPINE FUMARATE 50 MILLIGRAM(S): 200 TABLET, FILM COATED ORAL at 08:53

## 2023-09-23 RX ADMIN — DIVALPROEX SODIUM 1000 MILLIGRAM(S): 500 TABLET, DELAYED RELEASE ORAL at 21:34

## 2023-09-24 RX ADMIN — Medication 3 MILLIGRAM(S): at 20:31

## 2023-09-24 RX ADMIN — QUETIAPINE FUMARATE 50 MILLIGRAM(S): 200 TABLET, FILM COATED ORAL at 08:55

## 2023-09-24 RX ADMIN — DIVALPROEX SODIUM 1000 MILLIGRAM(S): 500 TABLET, DELAYED RELEASE ORAL at 20:31

## 2023-09-24 RX ADMIN — QUETIAPINE FUMARATE 50 MILLIGRAM(S): 200 TABLET, FILM COATED ORAL at 20:31

## 2023-09-25 PROCEDURE — 99232 SBSQ HOSP IP/OBS MODERATE 35: CPT

## 2023-09-25 RX ADMIN — QUETIAPINE FUMARATE 50 MILLIGRAM(S): 200 TABLET, FILM COATED ORAL at 20:33

## 2023-09-25 RX ADMIN — QUETIAPINE FUMARATE 50 MILLIGRAM(S): 200 TABLET, FILM COATED ORAL at 08:46

## 2023-09-25 RX ADMIN — DIVALPROEX SODIUM 1000 MILLIGRAM(S): 500 TABLET, DELAYED RELEASE ORAL at 20:33

## 2023-09-25 RX ADMIN — Medication 3 MILLIGRAM(S): at 20:33

## 2023-09-25 NOTE — BH INPATIENT PSYCHIATRY PROGRESS NOTE - NSBHMETABOLIC_PSY_ALL_CORE_FT
BMI: BMI (kg/m2): 29.3 (09-14-23 @ 00:56)  HbA1c: A1C with Estimated Average Glucose Result: 5.5 % (08-25-23 @ 13:32)    Glucose: POCT Blood Glucose.: 105 mg/dL (09-13-23 @ 23:21)    BP: 139/73 (09-24-23 @ 15:07) (139/73 - 139/73)  Lipid Panel: Date/Time: 05-26-23 @ 13:30  Cholesterol, Serum: 159  Direct LDL: --  HDL Cholesterol, Serum: 33  Total Cholesterol/HDL Ration Measurement: --  Triglycerides, Serum: 197

## 2023-09-25 NOTE — BH INPATIENT PSYCHIATRY PROGRESS NOTE - NSBHASSESSSUMMFT_PSY_ALL_CORE
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 , no known suicide attempt or violence hx, no known legal hx, no known substance abuse history who was brought in by EMS for concerns for suicidal ideation and bipolar decompensation.    Differential Diagnosis: Bipolar Disorder, Bipolar disorder with psychotic features, Schizoaffective disorder    On assessment, patient remains disorganized, paranoid, delusional, guarded, confused, calmer, better able to engage, denied AVH, SIIP, HI currently.     Plan:   -Admit to [facility], 9.39  -Routine observation q15 checks, No CO needed in a locked, supervised setting, taking meds.   -Psychiatry:   c/w Depakote ER 1000mg at bedtime for Bipolar d/o, c/w Seroquel to 50 mg BID for Mood/psychosis  diphenhydrAMINE 50 milliGRAM(s) Oral every 6 hours PRN agitation  diphenhydrAMINE Injectable 50 milliGRAM(s) IntraMuscular once PRN agitation  haloperidol     Tablet 2 milliGRAM(s) Oral every 6 hours PRN agitation  haloperidol    Injectable 2 milliGRAM(s) IntraMuscular Once PRN agitation  LORazepam     Tablet 1 milliGRAM(s) Oral every 6 hours PRN Agitation  LORazepam   Injectable 1 milliGRAM(s) IntraMuscular Once PRN Agitation  -Medical: Dry mouth; Biotene ordered   -Group and milieu therapy   -Dispo as per social work

## 2023-09-25 NOTE — BH INPATIENT PSYCHIATRY PROGRESS NOTE - NSBHFUPINTERVALHXFT_PSY_A_CORE
Patient resolved from [address] Transitions episode on 5/25/2021. Discussed COVID-19 related testing which was prior to ED visit result was positive      Patient/family has been provided the following resources and education related to COVID-19:                         Signs, symptoms and red flags related to COVID-19            CDC exposure and quarantine guidelines            [facility] exposure contact - [phone]            Contact for their local Department of Health                 Patient currently reports that the following symptoms have improved:  no new symptoms. No further outreach scheduled with this CTN/ACM/LPN/HC/ MA. Episode of Care resolved. Patient has this CTN/ACM/LPN/HC/MA contact information if future needs arise. Patient was seen and evaluated at bedside by her self, this was a face to face evaluation. Patient reported that she is starting to feels better although she continues to feels confused and forgetful. Patient admitted that she is not able to remember simple things or retain information. She wants to make sure that we discuss everything with her daughters. I informed the patient that we want to have all the information necessary in order to make a safe discharge plan for her and that includes for her to have the assessment by the Neuropsychologist. Patient again ask the purpose of the evaluation. It was reviewed with the patient that we to have a more accurate cognitive assessment and base on that we can plan for her to have more services at home. Patient needed constant repetition and explanation but at the end she agreed with this. Still she wants to leave the hospital as soon as possible.     Patient is more calm, pleasant and cooperative. She is reality oriented and pleasantly confused. She is able to contract for safety.

## 2023-09-25 NOTE — BH INPATIENT PSYCHIATRY PROGRESS NOTE - NSBHCHARTREVIEWVS_PSY_A_CORE FT
Vital Signs Last 24 Hrs  T(C): 36.4 (09-25-23 @ 07:39), Max: 36.4 (09-25-23 @ 07:39)  T(F): 97.6 (09-25-23 @ 07:39), Max: 97.6 (09-25-23 @ 07:39)  HR: --  BP: --  BP(mean): --  RR: 17 (09-25-23 @ 07:39) (17 - 17)  SpO2: 99% (09-25-23 @ 07:39) (99% - 99%)    Orthostatic VS  09-25-23 @ 07:39  Lying BP: --/-- HR: --  Sitting BP: 125/83 HR: 83  Standing BP: 125/73 HR: 91  Site: --  Mode: --  Orthostatic VS  09-24-23 @ 07:41  Lying BP: --/-- HR: --  Sitting BP: 150/78 HR: 83  Standing BP: 149/83 HR: 93  Site: --  Mode: --

## 2023-09-26 RX ADMIN — QUETIAPINE FUMARATE 50 MILLIGRAM(S): 200 TABLET, FILM COATED ORAL at 08:29

## 2023-09-26 RX ADMIN — DIVALPROEX SODIUM 1000 MILLIGRAM(S): 500 TABLET, DELAYED RELEASE ORAL at 20:50

## 2023-09-26 RX ADMIN — Medication 3 MILLIGRAM(S): at 20:50

## 2023-09-26 RX ADMIN — QUETIAPINE FUMARATE 50 MILLIGRAM(S): 200 TABLET, FILM COATED ORAL at 20:50

## 2023-09-26 NOTE — BH INPATIENT PSYCHIATRY PROGRESS NOTE - NSBHCHARTREVIEWVS_PSY_A_CORE FT
Vital Signs Last 24 Hrs  T(C): 37 (09-26-23 @ 07:24), Max: 37 (09-26-23 @ 07:24)  T(F): 98.6 (09-26-23 @ 07:24), Max: 98.6 (09-26-23 @ 07:24)  HR: --  BP: --  BP(mean): --  RR: 19 (09-26-23 @ 07:24) (19 - 19)  SpO2: 99% (09-26-23 @ 07:24) (99% - 99%)    Orthostatic VS  09-26-23 @ 07:24  Lying BP: --/-- HR: --  Sitting BP: 143/81 HR: 92  Standing BP: 142/86 HR: 100  Site: --  Mode: --  Orthostatic VS  09-25-23 @ 07:39  Lying BP: --/-- HR: --  Sitting BP: 125/83 HR: 83  Standing BP: 125/73 HR: 91  Site: --  Mode: --

## 2023-09-26 NOTE — BH INPATIENT PSYCHIATRY PROGRESS NOTE - PRN MEDS
MEDICATIONS  (PRN):  acetaminophen     Tablet .. 650 milliGRAM(s) Oral every 6 hours PRN Temp greater or equal to 38C (100.4F), Mild Pain (1 - 3), Moderate Pain (4 - 6), Severe Pain (7 - 10)  aluminum hydroxide/magnesium hydroxide/simethicone Suspension 30 milliLiter(s) Oral every 6 hours PRN Dyspepsia  diphenhydrAMINE 50 milliGRAM(s) Oral every 6 hours PRN agitation/EPS  diphenhydrAMINE Injectable 50 milliGRAM(s) IntraMuscular once PRN agitation  haloperidol     Tablet 2 milliGRAM(s) Oral every 6 hours PRN agitation  haloperidol    Injectable 2 milliGRAM(s) IntraMuscular Once PRN agitation  magnesium hydroxide Suspension 30 milliLiter(s) Oral daily PRN Constipation

## 2023-09-26 NOTE — BH INPATIENT PSYCHIATRY PROGRESS NOTE - NSBHFUPINTERVALHXFT_PSY_A_CORE
Patient was seen for f/u for disorganization. Chart reviewed and case discussed in team meeting. Patient was tearful during interview, feels alone, expressed concern about memory, "I think I keep forgetting". Patient reported she has a bad night, believes she didn't sleep and feels tired today. Patient reported she has the "craziest night last night" however could not elaborate. Patient had trouble keeping track of conversation. Encouraged patient to utilize notebook to write down daily activities to jog her memory. Patient reported she needed to speak with Daughter about plan. Patient reported +AH but upon further probing reported she heard the voices on the unit. Patient denied SIIP, VH, HI.

## 2023-09-26 NOTE — DIETITIAN INITIAL EVALUATION ADULT - PERTINENT MEDS FT
MEDICATIONS  (STANDING):  Biotene Dry Mouth Oral Rinse 15 milliLiter(s) Swish and Spit three times a day  divalproex ER 1000 milliGRAM(s) Oral at bedtime  melatonin. 3 milliGRAM(s) Oral at bedtime  QUEtiapine 50 milliGRAM(s) Oral two times a day    MEDICATIONS  (PRN):  acetaminophen     Tablet .. 650 milliGRAM(s) Oral every 6 hours PRN Temp greater or equal to 38C (100.4F), Mild Pain (1 - 3), Moderate Pain (4 - 6), Severe Pain (7 - 10)  aluminum hydroxide/magnesium hydroxide/simethicone Suspension 30 milliLiter(s) Oral every 6 hours PRN Dyspepsia  diphenhydrAMINE 50 milliGRAM(s) Oral every 6 hours PRN agitation/EPS  diphenhydrAMINE Injectable 50 milliGRAM(s) IntraMuscular once PRN agitation  haloperidol     Tablet 2 milliGRAM(s) Oral every 6 hours PRN agitation  haloperidol    Injectable 2 milliGRAM(s) IntraMuscular Once PRN agitation  magnesium hydroxide Suspension 30 milliLiter(s) Oral daily PRN Constipation

## 2023-09-26 NOTE — BH INPATIENT PSYCHIATRY PROGRESS NOTE - NSBHASSESSSUMMFT_PSY_ALL_CORE
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 , no known suicide attempt or violence hx, no known legal hx, no known substance abuse history who was brought in by EMS for concerns for suicidal ideation and bipolar decompensation.    Differential Diagnosis: Bipolar Disorder, Bipolar disorder with psychotic features, Schizoaffective disorder    On assessment, patient remains disorganized, paranoid, delusional, guarded, confused, calmer, better able to engage, denied AVH, SIIP, HI currently.     Plan:   -Admit to [facility], 9.39  -Routine observation q15 checks, No CO needed in a locked, supervised setting, taking meds.   -Psychiatry:   c/w Depakote ER 1000mg at bedtime for Bipolar d/o, c/w Seroquel to 50 mg BID for Mood/psychosis  diphenhydrAMINE 50 milliGRAM(s) Oral every 6 hours PRN agitation  diphenhydrAMINE Injectable 50 milliGRAM(s) IntraMuscular once PRN agitation  haloperidol     Tablet 2 milliGRAM(s) Oral every 6 hours PRN agitation  haloperidol    Injectable 2 milliGRAM(s) IntraMuscular Once PRN agitation  LORazepam     Tablet 1 milliGRAM(s) Oral every 6 hours PRN Agitation  LORazepam   Injectable 1 milliGRAM(s) IntraMuscular Once PRN Agitation  -Medical: Dry mouth; Biotene ordered   -Group and milieu therapy   -Dispo as per social work

## 2023-09-26 NOTE — DIETITIAN INITIAL EVALUATION ADULT - OTHER INFO
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 , no known suicide attempt or violence hx, no known legal hx, no known substance abuse history who was brought in by EMS for concerns for suicidal ideation and bipolar decompensation.  Spoke to patient in her room. Patient states her appetite is "so far good". Had no food preferences. Weights documented: 9/24 168#, 9/14 187#, 9/1 183#; it appears that 9/24 weight is inaccurate. States had some constipation yesterday-had bm- encouraged adequate fiber and fluid intake.

## 2023-09-27 PROCEDURE — 99232 SBSQ HOSP IP/OBS MODERATE 35: CPT

## 2023-09-27 RX ADMIN — DIVALPROEX SODIUM 1000 MILLIGRAM(S): 500 TABLET, DELAYED RELEASE ORAL at 21:02

## 2023-09-27 RX ADMIN — QUETIAPINE FUMARATE 50 MILLIGRAM(S): 200 TABLET, FILM COATED ORAL at 09:37

## 2023-09-27 RX ADMIN — Medication 3 MILLIGRAM(S): at 21:04

## 2023-09-27 RX ADMIN — QUETIAPINE FUMARATE 50 MILLIGRAM(S): 200 TABLET, FILM COATED ORAL at 21:02

## 2023-09-27 NOTE — BH INPATIENT PSYCHIATRY PROGRESS NOTE - NSBHASSESSSUMMFT_PSY_ALL_CORE
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 , no known suicide attempt or violence hx, no known legal hx, no known substance abuse history who was brought in by EMS for concerns for suicidal ideation and bipolar decompensation.    Differential Diagnosis: Bipolar Disorder, Bipolar disorder with psychotic features, Schizoaffective disorder    On assessment, patient remains disorganized, paranoid, delusional, guarded, confused, calmer, better able to engage, denied AVH, SIIP, HI currently.     Plan:   -Admit to [facility], 9.39  -Routine observation q15 checks, No CO needed in a locked, supervised setting, taking meds.   -Psychiatry:   c/w Depakote ER 1000mg at bedtime for Bipolar d/o, c/w Seroquel to 50 mg BID for Mood/psychosis  diphenhydrAMINE 50 milliGRAM(s) Oral every 6 hours PRN agitation  diphenhydrAMINE Injectable 50 milliGRAM(s) IntraMuscular once PRN agitation  haloperidol     Tablet 2 milliGRAM(s) Oral every 6 hours PRN agitation  haloperidol    Injectable 2 milliGRAM(s) IntraMuscular Once PRN agitation  LORazepam     Tablet 1 milliGRAM(s) Oral every 6 hours PRN Agitation  LORazepam   Injectable 1 milliGRAM(s) IntraMuscular Once PRN Agitation  -Medical: Dry mouth; Biotene ordered   -Group and milieu therapy   -Dispo as per social work

## 2023-09-27 NOTE — BH INPATIENT PSYCHIATRY PROGRESS NOTE - NSBHFUPINTERVALHXFT_PSY_A_CORE
Patient was seen for f/u for disorganization. Chart reviewed and case discussed in team meeting. Patient appears dazed. Started interview by saying "you don't kn ow the whole story. There's more to it". Patient was asked to elaborate but couldn't, then said "I feel people are following me". Patient was asked who is following her, she reported everyone. Reassurance provided, informed patient she is safe in the hospital. Patient reported memory is going in and out, expressed concern about long effects. Reminded patient of neuropsych testing scheduled for tomorrow. Patient was walking around with notebook and informed writer of family meeting scheduled for Friday. Patient denied SIIP, VH, HI.  Patient was seen for f/u for disorganization. Chart reviewed and case discussed in team meeting. Patient appears dazed. Started interview by saying "you don't kn ow the whole story. There's more to it". Patient was asked to elaborate but couldn't, then said "I feel people are following me". Patient was asked who is following her, she reported everyone. Reassurance provided, informed patient she is safe in the hospital. Patient reported memory is going in and out, expressed concern about long effects. Reminded patient of neuropsych testing scheduled for tomorrow. Patient was walking around with notebook and informed writer of family meeting scheduled for Friday. Patient denied SIIP, AVH, HI.

## 2023-09-27 NOTE — BH INPATIENT PSYCHIATRY PROGRESS NOTE - NSBHCHARTREVIEWVS_PSY_A_CORE FT
Vital Signs Last 24 Hrs  T(C): 36.4 (09-27-23 @ 08:45), Max: 36.4 (09-27-23 @ 08:45)  T(F): 97.5 (09-27-23 @ 08:45), Max: 97.5 (09-27-23 @ 08:45)  HR: --  BP: --  BP(mean): --  RR: --  SpO2: --    Orthostatic VS  09-27-23 @ 08:45  Lying BP: --/-- HR: --  Sitting BP: 140/77 HR: 100  Standing BP: 141/83 HR: 105  Site: --  Mode: --  Orthostatic VS  09-26-23 @ 07:24  Lying BP: --/-- HR: --  Sitting BP: 143/81 HR: 92  Standing BP: 142/86 HR: 100  Site: --  Mode: --

## 2023-09-27 NOTE — BH INPATIENT PSYCHIATRY PROGRESS NOTE - ATTENDING COMMENTS
Care was discussed and reviewed in interdisciplinary treatment team.  I, Veronika Gonzalez MD, have reviewed and verified the documentation.  I independently performed the documented medical decision making. Care was discussed and reviewed in interdisciplinary treatment team.  I, Veronika Gonzalez MD, have reviewed and verified the documentation.  I independently performed the documented medical decision making.    Patient continues to present with significant cognitive problems. Patient's short term memory is impaired. She is not able to remember conversations that she had with the staff. Needs prompting and redirection. She is overall calm and pleasant.

## 2023-09-28 PROCEDURE — 99232 SBSQ HOSP IP/OBS MODERATE 35: CPT

## 2023-09-28 RX ORDER — QUETIAPINE FUMARATE 200 MG/1
50 TABLET, FILM COATED ORAL DAILY
Refills: 0 | Status: DISCONTINUED | OUTPATIENT
Start: 2023-09-28 | End: 2023-10-24

## 2023-09-28 RX ORDER — QUETIAPINE FUMARATE 200 MG/1
100 TABLET, FILM COATED ORAL AT BEDTIME
Refills: 0 | Status: DISCONTINUED | OUTPATIENT
Start: 2023-09-28 | End: 2023-10-09

## 2023-09-28 RX ADMIN — DIVALPROEX SODIUM 1000 MILLIGRAM(S): 500 TABLET, DELAYED RELEASE ORAL at 20:34

## 2023-09-28 RX ADMIN — Medication 3 MILLIGRAM(S): at 20:34

## 2023-09-28 RX ADMIN — QUETIAPINE FUMARATE 50 MILLIGRAM(S): 200 TABLET, FILM COATED ORAL at 09:32

## 2023-09-28 RX ADMIN — QUETIAPINE FUMARATE 100 MILLIGRAM(S): 200 TABLET, FILM COATED ORAL at 20:34

## 2023-09-28 NOTE — BH INPATIENT PSYCHIATRY PROGRESS NOTE - NSBHCHARTREVIEWVS_PSY_A_CORE FT
Vital Signs Last 24 Hrs  T(C): 36.8 (09-28-23 @ 07:15), Max: 36.8 (09-28-23 @ 07:15)  T(F): 98.3 (09-28-23 @ 07:15), Max: 98.3 (09-28-23 @ 07:15)  HR: --  BP: --  BP(mean): --  RR: 19 (09-28-23 @ 07:15) (19 - 19)  SpO2: 99% (09-28-23 @ 07:15) (99% - 99%)    Orthostatic VS  09-28-23 @ 07:15  Lying BP: --/-- HR: --  Sitting BP: 154/81 HR: 102  Standing BP: 146/85 HR: 109  Site: --  Mode: --  Orthostatic VS  09-27-23 @ 08:45  Lying BP: --/-- HR: --  Sitting BP: 140/77 HR: 100  Standing BP: 141/83 HR: 105  Site: --  Mode: --

## 2023-09-28 NOTE — BH INPATIENT PSYCHIATRY PROGRESS NOTE - NSBHASSESSSUMMFT_PSY_ALL_CORE
Patient is a 59y/o F  but in a relationship, domiciled w/  daughter, her boyfriend, employed as a transport dispatch at [employer], w/ PPHx of bipolar d/o followed in [facility] AOP[facility], w/ 5 prior psychiatric hospitalizations most recently discharged from [facility] 9/7/23 for montserrat (9.13 admission, described as irritability, sleep disturbances, racing thoughts, restlessness), as well as psychotic symptoms (reported to be non command auditory hallucinations, delusional ideas of reference). , no known suicide attempt or violence hx, no known legal hx, no known substance abuse history who was brought in by EMS for concerns for suicidal ideation and bipolar decompensation.    Differential Diagnosis: Bipolar Disorder, Bipolar disorder with psychotic features, Schizoaffective disorder    On assessment, patient remains disorganized, paranoid, delusional, guarded, confused, calmer.    Plan:   -Admit to [facility], 9.39  -Routine observation q15 checks, No CO needed in a locked, supervised setting, taking meds.   -Psychiatry:   c/w Depakote ER 1000mg at bedtime for Bipolar d/o, c/w Seroquel to 50 mg BID for Mood/psychosis  diphenhydrAMINE 50 milliGRAM(s) Oral every 6 hours PRN agitation  diphenhydrAMINE Injectable 50 milliGRAM(s) IntraMuscular once PRN agitation  haloperidol     Tablet 2 milliGRAM(s) Oral every 6 hours PRN agitation  haloperidol    Injectable 2 milliGRAM(s) IntraMuscular Once PRN agitation  LORazepam     Tablet 1 milliGRAM(s) Oral every 6 hours PRN Agitation  LORazepam   Injectable 1 milliGRAM(s) IntraMuscular Once PRN Agitation  -Medical: Dry mouth; Biotene ordered   -Group and milieu therapy   -Dispo as per social work

## 2023-09-28 NOTE — BH INPATIENT PSYCHIATRY PROGRESS NOTE - ATTENDING COMMENTS
Care was discussed and reviewed in interdisciplinary treatment team.  I, Veronika Gonzalez MD, have reviewed and verified the documentation.  I independently performed the documented medical decision making.     Care was discussed and reviewed in interdisciplinary treatment team.  I, Veronika Gonzalez MD, have reviewed and verified the documentation.  I independently performed the documented medical decision making.    Patient remains forgetful and confused. She doesn't remember meeting with this writer yesterday nor the information that we have provided her. She is able to remember the date, but is not able to remember the reason for the hospitalization or the events leading to this.

## 2023-09-28 NOTE — BH INPATIENT PSYCHIATRY PROGRESS NOTE - NSBHFUPINTERVALHXFT_PSY_A_CORE
Patient was seen for f/u for disorganization. Chart reviewed and case discussed in team meeting. Patient reported she feels clear sometimes then it all feels to fall apart. Reassurance provided. Patient was informed neuropsych testing was changed to Monday. Patient was reminded of family meeting tomorrow. Patient inquired if Daughter knows what is going on with her. Patient was informed daughter is aware but we will be able to discuss further during meeting. Patient got up and walked out of interview and refused to answer any further questions.

## 2023-09-28 NOTE — BH INPATIENT PSYCHIATRY PROGRESS NOTE - CURRENT MEDICATION
MEDICATIONS  (STANDING):  Biotene Dry Mouth Oral Rinse 15 milliLiter(s) Swish and Spit three times a day  divalproex ER 1000 milliGRAM(s) Oral at bedtime  melatonin. 3 milliGRAM(s) Oral at bedtime  QUEtiapine 100 milliGRAM(s) Oral at bedtime  QUEtiapine 50 milliGRAM(s) Oral daily    MEDICATIONS  (PRN):  acetaminophen     Tablet .. 650 milliGRAM(s) Oral every 6 hours PRN Temp greater or equal to 38C (100.4F), Mild Pain (1 - 3), Moderate Pain (4 - 6), Severe Pain (7 - 10)  aluminum hydroxide/magnesium hydroxide/simethicone Suspension 30 milliLiter(s) Oral every 6 hours PRN Dyspepsia  diphenhydrAMINE 50 milliGRAM(s) Oral every 6 hours PRN agitation/EPS  diphenhydrAMINE Injectable 50 milliGRAM(s) IntraMuscular once PRN agitation  haloperidol     Tablet 2 milliGRAM(s) Oral every 6 hours PRN agitation  haloperidol    Injectable 2 milliGRAM(s) IntraMuscular Once PRN agitation  magnesium hydroxide Suspension 30 milliLiter(s) Oral daily PRN Constipation   MEDICATIONS  (STANDING):  Biotene Dry Mouth Oral Rinse 15 milliLiter(s) Swish and Spit three times a day  divalproex ER 1000 milliGRAM(s) Oral at bedtime  melatonin. 3 milliGRAM(s) Oral at bedtime  QUEtiapine 50 milliGRAM(s) Oral daily  QUEtiapine 100 milliGRAM(s) Oral at bedtime    MEDICATIONS  (PRN):  acetaminophen     Tablet .. 650 milliGRAM(s) Oral every 6 hours PRN Temp greater or equal to 38C (100.4F), Mild Pain (1 - 3), Moderate Pain (4 - 6), Severe Pain (7 - 10)  aluminum hydroxide/magnesium hydroxide/simethicone Suspension 30 milliLiter(s) Oral every 6 hours PRN Dyspepsia  diphenhydrAMINE 50 milliGRAM(s) Oral every 6 hours PRN agitation/EPS  diphenhydrAMINE Injectable 50 milliGRAM(s) IntraMuscular once PRN agitation  haloperidol     Tablet 2 milliGRAM(s) Oral every 6 hours PRN agitation  haloperidol    Injectable 2 milliGRAM(s) IntraMuscular Once PRN agitation  magnesium hydroxide Suspension 30 milliLiter(s) Oral daily PRN Constipation

## 2023-09-29 PROCEDURE — 99233 SBSQ HOSP IP/OBS HIGH 50: CPT

## 2023-09-29 RX ADMIN — QUETIAPINE FUMARATE 100 MILLIGRAM(S): 200 TABLET, FILM COATED ORAL at 21:27

## 2023-09-29 RX ADMIN — QUETIAPINE FUMARATE 50 MILLIGRAM(S): 200 TABLET, FILM COATED ORAL at 08:55

## 2023-09-29 RX ADMIN — Medication 3 MILLIGRAM(S): at 21:27

## 2023-09-29 RX ADMIN — Medication 15 MILLILITER(S): at 21:51

## 2023-09-29 RX ADMIN — DIVALPROEX SODIUM 1000 MILLIGRAM(S): 500 TABLET, DELAYED RELEASE ORAL at 21:27

## 2023-09-29 NOTE — BH INPATIENT PSYCHIATRY PROGRESS NOTE - NSBHFUPINTERVALHXFT_PSY_A_CORE
Today we had a care conference with patient's daughter, KAILA and this writer present during the meeting. The purpose of the meeting was to discuss the evaluations done by Neurology, results of the cognitive assessment and tentative discharge plans. I provided the daughter with a review of the evaluation done by Neurology and results of the MRI. Daughter has been informed that we think that patient is still struggling with the sequela of the Lithium toxicity and this is causing significant cognitive problems. I discuss with the daughter the results of the MMSE 21/30 and informed her that the patient seemed to be struggling with short term memory and new learning. Daughter is aware that the patient will have a Neuropsychiatric assessment on Monday and this will help us narrow her diagnosis.     Saying this we are recommending for the patient to have assistance with meal preparation (patient to not use the stove), money management, medications administration, transportation and making sure she goes for her appointments. In order to monitor the patient at home (given that the daughter is her neighbor) I recommended for the daughter to install a security camera and alarms in the doors and windows. KAILA discuss with the daughter that we started the application for a home health aid that can support the patient and the family. Daughter verbalized good understanding she is in agreement and supportive of this plan. She agrees that the patient needs more assistance and this is something the family was already trying to put in place but the patient was resistant. There are multiple family members that are willing to step in order to support the patient.     It was recommended for the daughter to assist the patient in completing a HCA and POA. We encourage the daughter to talk to the patient's PCP and discuss completing a POLST. We informed the daughter that once we are able to set up this services at home patient will be able to discharge. Patient was in agreement with this plan.     Patient was brought into the meeting and the same information that we gave the daughter it was reviewed with the patient. Patient needed time to process all of this information but at the end she was in agreement with our recommendations. Patient continues to present as confused and with significant cognitive problems. Patient has been able to contract for safety and stated that he goal will be to return home.

## 2023-09-29 NOTE — BH INPATIENT PSYCHIATRY PROGRESS NOTE - CURRENT MEDICATION
MEDICATIONS  (STANDING):  Biotene Dry Mouth Oral Rinse 15 milliLiter(s) Swish and Spit three times a day  divalproex ER 1000 milliGRAM(s) Oral at bedtime  melatonin. 3 milliGRAM(s) Oral at bedtime  QUEtiapine 100 milliGRAM(s) Oral at bedtime  QUEtiapine 50 milliGRAM(s) Oral daily    MEDICATIONS  (PRN):  acetaminophen     Tablet .. 650 milliGRAM(s) Oral every 6 hours PRN Temp greater or equal to 38C (100.4F), Mild Pain (1 - 3), Moderate Pain (4 - 6), Severe Pain (7 - 10)  aluminum hydroxide/magnesium hydroxide/simethicone Suspension 30 milliLiter(s) Oral every 6 hours PRN Dyspepsia  diphenhydrAMINE 50 milliGRAM(s) Oral every 6 hours PRN agitation/EPS  diphenhydrAMINE Injectable 50 milliGRAM(s) IntraMuscular once PRN agitation  haloperidol     Tablet 2 milliGRAM(s) Oral every 6 hours PRN agitation  haloperidol    Injectable 2 milliGRAM(s) IntraMuscular Once PRN agitation  magnesium hydroxide Suspension 30 milliLiter(s) Oral daily PRN Constipation

## 2023-09-29 NOTE — BH INPATIENT PSYCHIATRY PROGRESS NOTE - NSBHCHARTREVIEWVS_PSY_A_CORE FT
Vital Signs Last 24 Hrs  T(C): --  T(F): --  HR: --  BP: --  BP(mean): --  RR: --  SpO2: --    Orthostatic VS  09-28-23 @ 07:15  Lying BP: --/-- HR: --  Sitting BP: 154/81 HR: 102  Standing BP: 146/85 HR: 109  Site: --  Mode: --

## 2023-09-29 NOTE — BH INPATIENT PSYCHIATRY PROGRESS NOTE - NSBHASSESSSUMMFT_PSY_ALL_CORE
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 , no known suicide attempt or violence hx, no known legal hx, no known substance abuse history who was brought in by EMS for concerns for suicidal ideation and bipolar decompensation.    Differential Diagnosis: Bipolar Disorder, Bipolar disorder with psychotic features, Schizoaffective disorder    On assessment, patient remains disorganized, paranoid, delusional, guarded, confused, calmer.    Plan:   -Admit to [facility], 9.39 ahs been converted to a [facility]  -Routine observation q15 checks, No CO needed in a locked, supervised setting, taking meds.   -Psychiatry:   c/w Depakote ER 1000mg at bedtime for Bipolar d/o, c/w Seroquel to 50 mg daily and 100mg at bedtime for Mood/psychosis  diphenhydrAMINE 50 milliGRAM(s) Oral every 6 hours PRN agitation  diphenhydrAMINE Injectable 50 milliGRAM(s) IntraMuscular once PRN agitation  haloperidol     Tablet 2 milliGRAM(s) Oral every 6 hours PRN agitation  haloperidol    Injectable 2 milliGRAM(s) IntraMuscular Once PRN agitation  LORazepam     Tablet 1 milliGRAM(s) Oral every 6 hours PRN Agitation  LORazepam   Injectable 1 milliGRAM(s) IntraMuscular Once PRN Agitation  -Medical: Dry mouth; Biotene ordered   -Group and milieu therapy   -Dispo as per social work

## 2023-09-30 RX ADMIN — QUETIAPINE FUMARATE 100 MILLIGRAM(S): 200 TABLET, FILM COATED ORAL at 20:53

## 2023-09-30 RX ADMIN — Medication 3 MILLIGRAM(S): at 20:53

## 2023-09-30 RX ADMIN — QUETIAPINE FUMARATE 50 MILLIGRAM(S): 200 TABLET, FILM COATED ORAL at 09:19

## 2023-09-30 RX ADMIN — Medication 15 MILLILITER(S): at 08:37

## 2023-09-30 RX ADMIN — DIVALPROEX SODIUM 1000 MILLIGRAM(S): 500 TABLET, DELAYED RELEASE ORAL at 20:53

## 2023-10-01 RX ADMIN — DIVALPROEX SODIUM 1000 MILLIGRAM(S): 500 TABLET, DELAYED RELEASE ORAL at 20:37

## 2023-10-01 RX ADMIN — Medication 15 MILLILITER(S): at 13:24

## 2023-10-01 RX ADMIN — QUETIAPINE FUMARATE 100 MILLIGRAM(S): 200 TABLET, FILM COATED ORAL at 20:37

## 2023-10-01 RX ADMIN — Medication 3 MILLIGRAM(S): at 20:38

## 2023-10-01 RX ADMIN — QUETIAPINE FUMARATE 50 MILLIGRAM(S): 200 TABLET, FILM COATED ORAL at 09:20

## 2023-10-02 PROCEDURE — 99233 SBSQ HOSP IP/OBS HIGH 50: CPT

## 2023-10-02 RX ADMIN — QUETIAPINE FUMARATE 100 MILLIGRAM(S): 200 TABLET, FILM COATED ORAL at 21:20

## 2023-10-02 RX ADMIN — Medication 3 MILLIGRAM(S): at 21:20

## 2023-10-02 RX ADMIN — Medication 15 MILLILITER(S): at 12:25

## 2023-10-02 RX ADMIN — Medication 15 MILLILITER(S): at 09:46

## 2023-10-02 RX ADMIN — QUETIAPINE FUMARATE 50 MILLIGRAM(S): 200 TABLET, FILM COATED ORAL at 08:27

## 2023-10-02 RX ADMIN — DIVALPROEX SODIUM 1000 MILLIGRAM(S): 500 TABLET, DELAYED RELEASE ORAL at 21:20

## 2023-10-02 NOTE — BH INPATIENT PSYCHIATRY PROGRESS NOTE - NSBHCHARTREVIEWVS_PSY_A_CORE FT
Vital Signs Last 24 Hrs  T(C): 36.6 (10-02-23 @ 07:17), Max: 36.6 (10-02-23 @ 07:17)  T(F): 97.9 (10-02-23 @ 07:17), Max: 97.9 (10-02-23 @ 07:17)  HR: --  BP: --  BP(mean): --  RR: 18 (10-02-23 @ 07:17) (18 - 18)  SpO2: 99% (10-02-23 @ 07:17) (99% - 99%)    Orthostatic VS  10-02-23 @ 07:17  Lying BP: --/-- HR: --  Sitting BP: 151/81 HR: 106  Standing BP: 138/91 HR: 110  Site: --  Mode: --  Orthostatic VS  10-01-23 @ 07:29  Lying BP: --/-- HR: --  Sitting BP: 136/71 HR: 78  Standing BP: 147/78 HR: 79  Site: upper left arm  Mode: electronic

## 2023-10-02 NOTE — BH INPATIENT PSYCHIATRY PROGRESS NOTE - NSBHFUPINTERVALHXFT_PSY_A_CORE
Patient was seen for f/u for Bipolar disorder, cognitive impairment. Chart reviewed and case discussed in team meeting. Neuropsych testing performed today. Patient reported testing was "easy", thought we were going to open her skull and "do something to my brain". Patient reported she "feels clear today", presented as brighter, more spontaneous. Patient continued to have difficulty with memory. could not remember family meeting on Friday. Patient reported she is missing items including her notebook, nursing informed. Patient inquired about court tomorrow, informed court was related to her petition for discharge. Patient stated "I don't think I can go home just yet", asked if she could stay on this unit. Reassured patient she would remains on this unit, team is working on discharge plan. Patient tolerating Seroquel, denied any side effects, no akathisia, changes in appetite, EPS, sedation. Patient denied SIIP. Patient reported improved sleep last night. Later patient informed nursing staff no one spoke with her today.

## 2023-10-02 NOTE — BH INPATIENT PSYCHIATRY PROGRESS NOTE - NSBHASSESSSUMMFT_PSY_ALL_CORE
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 , no known suicide attempt or violence hx, no known legal hx, no known substance abuse history who was brought in by EMS for concerns for suicidal ideation and bipolar decompensation.    Differential Diagnosis: Bipolar Disorder, Bipolar disorder with psychotic features, Schizoaffective disorder    On assessment, patient remains disorganized, confused, calmer.    Plan:   -Admit to [facility], 9.39 ahs been converted to a [facility]  -Routine observation q15 checks, No CO needed in a locked, supervised setting, taking meds.   -Psychiatry:   c/w Depakote ER 1000mg at bedtime for Bipolar d/o, c/w Seroquel to 50 mg daily and 100mg at bedtime for Mood/psychosis  diphenhydrAMINE 50 milliGRAM(s) Oral every 6 hours PRN agitation  diphenhydrAMINE Injectable 50 milliGRAM(s) IntraMuscular once PRN agitation  haloperidol     Tablet 2 milliGRAM(s) Oral every 6 hours PRN agitation  haloperidol    Injectable 2 milliGRAM(s) IntraMuscular Once PRN agitation  LORazepam     Tablet 1 milliGRAM(s) Oral every 6 hours PRN Agitation  LORazepam   Injectable 1 milliGRAM(s) IntraMuscular Once PRN Agitation  -Medical: Dry mouth; Biotene ordered   -Group and milieu therapy   -Dispo as per social work

## 2023-10-03 PROCEDURE — 99233 SBSQ HOSP IP/OBS HIGH 50: CPT

## 2023-10-03 RX ADMIN — QUETIAPINE FUMARATE 50 MILLIGRAM(S): 200 TABLET, FILM COATED ORAL at 09:21

## 2023-10-03 RX ADMIN — DIVALPROEX SODIUM 1000 MILLIGRAM(S): 500 TABLET, DELAYED RELEASE ORAL at 20:44

## 2023-10-03 RX ADMIN — Medication 15 MILLILITER(S): at 09:28

## 2023-10-03 RX ADMIN — QUETIAPINE FUMARATE 100 MILLIGRAM(S): 200 TABLET, FILM COATED ORAL at 20:44

## 2023-10-03 RX ADMIN — Medication 3 MILLIGRAM(S): at 20:44

## 2023-10-03 NOTE — BH INPATIENT PSYCHIATRY PROGRESS NOTE - NSBHASSESSSUMMFT_PSY_ALL_CORE
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 , no known suicide attempt or violence hx, no known legal hx, no known substance abuse history who was brought in by EMS for concerns for suicidal ideation and bipolar decompensation.    Differential Diagnosis: Bipolar Disorder, Bipolar disorder with psychotic features, Schizoaffective disorder    On assessment, patient remains disorganized, confused, calmer, endorsed SI no intent or plan.     Plan:   -Admit to [facility], 9.39 ahs been converted to a [facility]  -Routine observation q15 checks, No CO needed in a locked, supervised setting, taking meds.   -Psychiatry:   c/w Depakote ER 1000mg at bedtime for Bipolar d/o, c/w Seroquel to 50 mg daily and 100mg at bedtime for Mood/psychosis  diphenhydrAMINE 50 milliGRAM(s) Oral every 6 hours PRN agitation  diphenhydrAMINE Injectable 50 milliGRAM(s) IntraMuscular once PRN agitation  haloperidol     Tablet 2 milliGRAM(s) Oral every 6 hours PRN agitation  haloperidol    Injectable 2 milliGRAM(s) IntraMuscular Once PRN agitation  LORazepam     Tablet 1 milliGRAM(s) Oral every 6 hours PRN Agitation  LORazepam   Injectable 1 milliGRAM(s) IntraMuscular Once PRN Agitation  -Medical: Dry mouth; Biotene ordered   -Group and milieu therapy   -Dispo as per social work

## 2023-10-03 NOTE — BH INPATIENT PSYCHIATRY PROGRESS NOTE - NSBHCHARTREVIEWVS_PSY_A_CORE FT
Vital Signs Last 24 Hrs  T(C): 36.2 (10-03-23 @ 07:52), Max: 36.2 (10-03-23 @ 07:52)  T(F): 97.2 (10-03-23 @ 07:52), Max: 97.2 (10-03-23 @ 07:52)  HR: --  BP: --  BP(mean): --  RR: 18 (10-03-23 @ 07:52) (18 - 18)  SpO2: --    Orthostatic VS  10-03-23 @ 07:52  Lying BP: --/-- HR: --  Sitting BP: 121/88 HR: 97  Standing BP: 131/77 HR: 106  Site: --  Mode: --  Orthostatic VS  10-02-23 @ 07:17  Lying BP: --/-- HR: --  Sitting BP: 151/81 HR: 106  Standing BP: 138/91 HR: 110  Site: --  Mode: --

## 2023-10-03 NOTE — BH INPATIENT PSYCHIATRY PROGRESS NOTE - CURRENT MEDICATION
MEDICATIONS  (STANDING):  Biotene Dry Mouth Oral Rinse 15 milliLiter(s) Swish and Spit three times a day  divalproex ER 1000 milliGRAM(s) Oral at bedtime  melatonin. 3 milliGRAM(s) Oral at bedtime  QUEtiapine 50 milliGRAM(s) Oral daily  QUEtiapine 100 milliGRAM(s) Oral at bedtime    MEDICATIONS  (PRN):  acetaminophen     Tablet .. 650 milliGRAM(s) Oral every 6 hours PRN Temp greater or equal to 38C (100.4F), Mild Pain (1 - 3), Moderate Pain (4 - 6), Severe Pain (7 - 10)  aluminum hydroxide/magnesium hydroxide/simethicone Suspension 30 milliLiter(s) Oral every 6 hours PRN Dyspepsia  diphenhydrAMINE 50 milliGRAM(s) Oral every 6 hours PRN agitation/EPS  diphenhydrAMINE Injectable 50 milliGRAM(s) IntraMuscular once PRN agitation  haloperidol     Tablet 2 milliGRAM(s) Oral every 6 hours PRN agitation  haloperidol    Injectable 2 milliGRAM(s) IntraMuscular Once PRN agitation  magnesium hydroxide Suspension 30 milliLiter(s) Oral daily PRN Constipation

## 2023-10-03 NOTE — BH INPATIENT PSYCHIATRY PROGRESS NOTE - NSBHFUPINTERVALHXFT_PSY_A_CORE
Patient was seen for f/u for Bipolar disorder, cognitive impairment. Chart reviewed and case discussed in team meeting. Patient appeared depressed, was tearful. Patient reported she could not remember what she ate got breakfast, could not remember meeting with writer or unit chief. Patient reported she lost notebook, was given a new notebook. Patient was encouraged to take notes about daily activities to be able to reflect if she forgets. Patient reported she feels nobody is listening to her, re-educated patient about memory loss. Patient reported SI no intent or plan, reporting "I feel I don't belong". Patient denied AVH, no paranoia elicited. Patient tolerating medications, denied any side effects, no akathisia, EPS< TD, constipation.

## 2023-10-04 PROCEDURE — 99233 SBSQ HOSP IP/OBS HIGH 50: CPT

## 2023-10-04 RX ORDER — BUPROPION HYDROCHLORIDE 150 MG/1
150 TABLET, EXTENDED RELEASE ORAL DAILY
Refills: 0 | Status: DISCONTINUED | OUTPATIENT
Start: 2023-10-04 | End: 2023-10-24

## 2023-10-04 RX ORDER — DONEPEZIL HYDROCHLORIDE 10 MG/1
5 TABLET, FILM COATED ORAL AT BEDTIME
Refills: 0 | Status: DISCONTINUED | OUTPATIENT
Start: 2023-10-04 | End: 2023-10-09

## 2023-10-04 RX ADMIN — Medication 3 MILLIGRAM(S): at 20:48

## 2023-10-04 RX ADMIN — DONEPEZIL HYDROCHLORIDE 5 MILLIGRAM(S): 10 TABLET, FILM COATED ORAL at 20:48

## 2023-10-04 RX ADMIN — Medication 650 MILLIGRAM(S): at 04:51

## 2023-10-04 RX ADMIN — Medication 15 MILLILITER(S): at 08:57

## 2023-10-04 RX ADMIN — QUETIAPINE FUMARATE 50 MILLIGRAM(S): 200 TABLET, FILM COATED ORAL at 08:54

## 2023-10-04 RX ADMIN — DIVALPROEX SODIUM 1000 MILLIGRAM(S): 500 TABLET, DELAYED RELEASE ORAL at 20:48

## 2023-10-04 RX ADMIN — QUETIAPINE FUMARATE 100 MILLIGRAM(S): 200 TABLET, FILM COATED ORAL at 20:48

## 2023-10-04 RX ADMIN — BUPROPION HYDROCHLORIDE 150 MILLIGRAM(S): 150 TABLET, EXTENDED RELEASE ORAL at 11:45

## 2023-10-04 NOTE — BH INPATIENT PSYCHIATRY PROGRESS NOTE - NSBHASSESSSUMMFT_PSY_ALL_CORE
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 , no known suicide attempt or violence hx, no known legal hx, no known substance abuse history who was brought in by EMS for concerns for suicidal ideation and bipolar decompensation.    Differential Diagnosis: Bipolar Disorder, Bipolar disorder with psychotic features, Schizoaffective disorder    On assessment, patient remains disorganized and confused intermittently, had improved mood, denied SIIP, AVH.     Plan:   -Admit to [facility], 9.39 ahs been converted to a [facility]  -Routine observation q15 checks, No CO needed in a locked, supervised setting, taking meds.   -Psychiatry:   c/w Depakote ER 1000mg at bedtime for Bipolar d/o, c/w Seroquel to 50 mg daily and 100mg at bedtime for Mood/psychosis, Added Buproprion XL 15omg daily for depressive episode and Donepezil 5mg at bedtime for cognitive impairment.   diphenhydrAMINE 50 milliGRAM(s) Oral every 6 hours PRN agitation  diphenhydrAMINE Injectable 50 milliGRAM(s) IntraMuscular once PRN agitation  haloperidol     Tablet 2 milliGRAM(s) Oral every 6 hours PRN agitation  haloperidol    Injectable 2 milliGRAM(s) IntraMuscular Once PRN agitation  LORazepam     Tablet 1 milliGRAM(s) Oral every 6 hours PRN Agitation  LORazepam   Injectable 1 milliGRAM(s) IntraMuscular Once PRN Agitation  -Medical: Dry mouth; Biotene ordered   -Group and milieu therapy   -Dispo as per social work

## 2023-10-04 NOTE — BH INPATIENT PSYCHIATRY PROGRESS NOTE - NSBHFUPINTERVALHXFT_PSY_A_CORE
Patient was seen for f/u for Bipolar disorder, cognitive impairment. Chart reviewed and case discussed in team meeting. Patient reported improved mood today, feels "clear".  Patient reported having clarity of situation after speaking with Psych rehab staff and feels things are back to normal. Patient was able to correctly identify the year, writer's name, facility and grandson's age which she was not able to remember previously. Patient later came to nurse's station and reported she was confused about conversation with writer and asked to meet again. Discussed with patient adding Wellbutrin and Donepezil to target depressed mood and memory, patient agreed. Patient denied AVH, no paranoia elicited. Patient tolerating medications, denied any side effects, no akathisia, EPS, TD, constipation.

## 2023-10-04 NOTE — BH INPATIENT PSYCHIATRY PROGRESS NOTE - ATTENDING COMMENTS
Care was discussed and reviewed in interdisciplinary treatment team.  I, Veronika Gonzalez MD, have reviewed and verified the documentation.  I independently performed the documented medical decision making. Care was discussed and reviewed in interdisciplinary treatment team.  I, Veronika Gonzalez MD, have reviewed and verified the documentation.  I independently performed the documented medical decision making.    Patient continues to present with significant cognitive problems. She needs redirection and multiple reminders during the day. Given the level of impairment we have decided to start Aricept. Patient has reported feeling depressed and at times having SI. Wellbutrin will be started today to target the depressive symptoms.

## 2023-10-04 NOTE — BH INPATIENT PSYCHIATRY PROGRESS NOTE - CURRENT MEDICATION
MEDICATIONS  (STANDING):  Biotene Dry Mouth Oral Rinse 15 milliLiter(s) Swish and Spit three times a day  buPROPion XL (24-Hour) 150 milliGRAM(s) Oral daily  divalproex ER 1000 milliGRAM(s) Oral at bedtime  donepezil 5 milliGRAM(s) Oral at bedtime  melatonin. 3 milliGRAM(s) Oral at bedtime  QUEtiapine 100 milliGRAM(s) Oral at bedtime  QUEtiapine 50 milliGRAM(s) Oral daily    MEDICATIONS  (PRN):  acetaminophen     Tablet .. 650 milliGRAM(s) Oral every 6 hours PRN Temp greater or equal to 38C (100.4F), Mild Pain (1 - 3), Moderate Pain (4 - 6), Severe Pain (7 - 10)  aluminum hydroxide/magnesium hydroxide/simethicone Suspension 30 milliLiter(s) Oral every 6 hours PRN Dyspepsia  diphenhydrAMINE 50 milliGRAM(s) Oral every 6 hours PRN agitation/EPS  diphenhydrAMINE Injectable 50 milliGRAM(s) IntraMuscular once PRN agitation  haloperidol     Tablet 2 milliGRAM(s) Oral every 6 hours PRN agitation  haloperidol    Injectable 2 milliGRAM(s) IntraMuscular Once PRN agitation  magnesium hydroxide Suspension 30 milliLiter(s) Oral daily PRN Constipation   MEDICATIONS  (STANDING):  Biotene Dry Mouth Oral Rinse 15 milliLiter(s) Swish and Spit three times a day  buPROPion XL (24-Hour) 150 milliGRAM(s) Oral daily  divalproex ER 1000 milliGRAM(s) Oral at bedtime  donepezil 5 milliGRAM(s) Oral at bedtime  melatonin. 3 milliGRAM(s) Oral at bedtime  QUEtiapine 50 milliGRAM(s) Oral daily  QUEtiapine 100 milliGRAM(s) Oral at bedtime    MEDICATIONS  (PRN):  acetaminophen     Tablet .. 650 milliGRAM(s) Oral every 6 hours PRN Temp greater or equal to 38C (100.4F), Mild Pain (1 - 3), Moderate Pain (4 - 6), Severe Pain (7 - 10)  aluminum hydroxide/magnesium hydroxide/simethicone Suspension 30 milliLiter(s) Oral every 6 hours PRN Dyspepsia  diphenhydrAMINE 50 milliGRAM(s) Oral every 6 hours PRN agitation/EPS  diphenhydrAMINE Injectable 50 milliGRAM(s) IntraMuscular once PRN agitation  haloperidol     Tablet 2 milliGRAM(s) Oral every 6 hours PRN agitation  haloperidol    Injectable 2 milliGRAM(s) IntraMuscular Once PRN agitation  magnesium hydroxide Suspension 30 milliLiter(s) Oral daily PRN Constipation

## 2023-10-04 NOTE — BH TREATMENT PLAN - NSTXCONFINTERMD_PSY_ALL_CORE
Med management; group/milieu therapy. Med management; group/milieu therapy.  Neurology has been involved

## 2023-10-04 NOTE — BH INPATIENT PSYCHIATRY PROGRESS NOTE - NSBHCHARTREVIEWVS_PSY_A_CORE FT
Vital Signs Last 24 Hrs  T(C): 36.4 (10-04-23 @ 07:41), Max: 36.4 (10-04-23 @ 07:41)  T(F): 97.6 (10-04-23 @ 07:41), Max: 97.6 (10-04-23 @ 07:41)  HR: --  BP: --  BP(mean): --  RR: 19 (10-04-23 @ 07:41) (19 - 19)  SpO2: 99% (10-04-23 @ 07:41) (99% - 99%)    Orthostatic VS  10-04-23 @ 07:41  Lying BP: --/-- HR: --  Sitting BP: 134/71 HR: 72  Standing BP: 136/68 HR: 80  Site: --  Mode: --  Orthostatic VS  10-03-23 @ 07:52  Lying BP: --/-- HR: --  Sitting BP: 121/88 HR: 97  Standing BP: 131/77 HR: 106  Site: --  Mode: --

## 2023-10-05 PROCEDURE — 99233 SBSQ HOSP IP/OBS HIGH 50: CPT

## 2023-10-05 RX ADMIN — Medication 15 MILLILITER(S): at 09:53

## 2023-10-05 RX ADMIN — QUETIAPINE FUMARATE 50 MILLIGRAM(S): 200 TABLET, FILM COATED ORAL at 09:53

## 2023-10-05 RX ADMIN — QUETIAPINE FUMARATE 100 MILLIGRAM(S): 200 TABLET, FILM COATED ORAL at 20:53

## 2023-10-05 RX ADMIN — BUPROPION HYDROCHLORIDE 150 MILLIGRAM(S): 150 TABLET, EXTENDED RELEASE ORAL at 09:53

## 2023-10-05 RX ADMIN — Medication 3 MILLIGRAM(S): at 20:53

## 2023-10-05 RX ADMIN — DONEPEZIL HYDROCHLORIDE 5 MILLIGRAM(S): 10 TABLET, FILM COATED ORAL at 20:53

## 2023-10-05 RX ADMIN — DIVALPROEX SODIUM 1000 MILLIGRAM(S): 500 TABLET, DELAYED RELEASE ORAL at 20:54

## 2023-10-05 NOTE — BH INPATIENT PSYCHIATRY PROGRESS NOTE - CURRENT MEDICATION
MEDICATIONS  (STANDING):  Biotene Dry Mouth Oral Rinse 15 milliLiter(s) Swish and Spit three times a day  buPROPion XL (24-Hour) 150 milliGRAM(s) Oral daily  divalproex ER 1000 milliGRAM(s) Oral at bedtime  donepezil 5 milliGRAM(s) Oral at bedtime  melatonin. 3 milliGRAM(s) Oral at bedtime  QUEtiapine 100 milliGRAM(s) Oral at bedtime  QUEtiapine 50 milliGRAM(s) Oral daily    MEDICATIONS  (PRN):  acetaminophen     Tablet .. 650 milliGRAM(s) Oral every 6 hours PRN Temp greater or equal to 38C (100.4F), Mild Pain (1 - 3), Moderate Pain (4 - 6), Severe Pain (7 - 10)  aluminum hydroxide/magnesium hydroxide/simethicone Suspension 30 milliLiter(s) Oral every 6 hours PRN Dyspepsia  diphenhydrAMINE 50 milliGRAM(s) Oral every 6 hours PRN agitation/EPS  diphenhydrAMINE Injectable 50 milliGRAM(s) IntraMuscular once PRN agitation  haloperidol     Tablet 2 milliGRAM(s) Oral every 6 hours PRN agitation  haloperidol    Injectable 2 milliGRAM(s) IntraMuscular Once PRN agitation  magnesium hydroxide Suspension 30 milliLiter(s) Oral daily PRN Constipation   MEDICATIONS  (STANDING):  Biotene Dry Mouth Oral Rinse 15 milliLiter(s) Swish and Spit three times a day  buPROPion XL (24-Hour) 150 milliGRAM(s) Oral daily  divalproex ER 1000 milliGRAM(s) Oral at bedtime  donepezil 5 milliGRAM(s) Oral at bedtime  melatonin. 3 milliGRAM(s) Oral at bedtime  QUEtiapine 50 milliGRAM(s) Oral daily  QUEtiapine 100 milliGRAM(s) Oral at bedtime    MEDICATIONS  (PRN):  acetaminophen     Tablet .. 650 milliGRAM(s) Oral every 6 hours PRN Temp greater or equal to 38C (100.4F), Mild Pain (1 - 3), Moderate Pain (4 - 6), Severe Pain (7 - 10)  aluminum hydroxide/magnesium hydroxide/simethicone Suspension 30 milliLiter(s) Oral every 6 hours PRN Dyspepsia  diphenhydrAMINE 50 milliGRAM(s) Oral every 6 hours PRN agitation/EPS  diphenhydrAMINE Injectable 50 milliGRAM(s) IntraMuscular once PRN agitation  haloperidol     Tablet 2 milliGRAM(s) Oral every 6 hours PRN agitation  haloperidol    Injectable 2 milliGRAM(s) IntraMuscular Once PRN agitation  LORazepam     Tablet 1 milliGRAM(s) Oral every 6 hours PRN Agitation  LORazepam   Injectable 1 milliGRAM(s) IntraMuscular Once PRN Agitation  magnesium hydroxide Suspension 30 milliLiter(s) Oral daily PRN Constipation

## 2023-10-05 NOTE — BH INPATIENT PSYCHIATRY PROGRESS NOTE - NSBHFUPINTERVALHXFT_PSY_A_CORE
Patient was seen for f/u for Bipolar disorder, cognitive impairment. Chart reviewed and case discussed in team meeting. Patient reported feeling "weird", again feels she doesn't belong today, reported mood as "good", feel addition of Wellbutrin and Donepezil is helping, believes her memory improved. Patient later came to nurses station to clarify conversation with writer. Patient was able to clarify "weird" feeling is related to wanting to go home. Reviewed results of neuropsych testing and recommendations. Patient reported feeling "good" about results and plan. Informed Daughter, Rozina, was contacted and given results. Neuropsych testing confirmed Dementia diagnosis related to lithium toxicity and the need for 24 hour care for safety, occupational therapy and cognitive rehabilitation. Patient has significant memory lapses, impaired executive functioning. Patient denied AVH, no paranoia elicited. Patient tolerating medications, denied any side effects, no akathisia, EPS, TD, constipation.     Collateral: Daughter Rozina: Called to inform results of neuropsych testing and recommendations.

## 2023-10-05 NOTE — BH INPATIENT PSYCHIATRY PROGRESS NOTE - NSBHASSESSSUMMFT_PSY_ALL_CORE
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, w/ PPHx of bipolar d/o followed in [facility], w/ 5 prior psychiatric hospitalizations most recently discharged from [facility] 9/7/23 for montserrat (9.13 admission, described as irritability, sleep disturbances, racing thoughts, restlessness), as well as psychotic symptoms (reported to be non command auditory hallucinations, delusional ideas of reference). , no known suicide attempt or violence hx, no known legal hx, no known substance abuse history who was brought in by EMS for concerns for suicidal ideation and bipolar decompensation.    Differential Diagnosis: Bipolar Disorder, Bipolar disorder with psychotic features, Schizoaffective disorder    On assessment, patient remains disorganized and confused intermittently, had improved mood, denied SIIP, AVH.     Plan:   -Admit to [facility], 9.39 ahs been converted to a [facility]  -Routine observation q15 checks, No CO needed in a locked, supervised setting, taking meds.   -Psychiatry:   c/w Depakote ER 1000mg at bedtime for Bipolar d/o, c/w Seroquel to 50 mg daily and 100mg at bedtime for Mood/psychosis, Added Buproprion XL 15omg daily for depressive episode and Donepezil 5mg at bedtime for cognitive impairment.   diphenhydrAMINE 50 milliGRAM(s) Oral every 6 hours PRN agitation  diphenhydrAMINE Injectable 50 milliGRAM(s) IntraMuscular once PRN agitation  haloperidol     Tablet 2 milliGRAM(s) Oral every 6 hours PRN agitation  haloperidol    Injectable 2 milliGRAM(s) IntraMuscular Once PRN agitation  LORazepam     Tablet 1 milliGRAM(s) Oral every 6 hours PRN Agitation  LORazepam   Injectable 1 milliGRAM(s) IntraMuscular Once PRN Agitation  -Medical: Dry mouth; Biotene ordered   -Group and milieu therapy   -Dispo as per social work     Patient is a 57y/o F  but in a relationship, domiciled w/  daughter, her boyfriend, 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 , no known suicide attempt or violence hx, no known legal hx, no known substance abuse history who was brought in by EMS for concerns for suicidal ideation and bipolar decompensation.    Differential Diagnosis: Bipolar Disorder, Bipolar disorder with psychotic features, Schizoaffective disorder    On assessment, patient remains disorganized and confused intermittently, had improved mood, denied SIIP, AVH.     Plan:   -Admit to [facility], 9.39 ahs been converted to a [facility]  -Routine observation q15 checks, No CO needed in a locked, supervised setting, taking meds.   -Psychiatry:   c/w Depakote ER 1000mg at bedtime for Bipolar d/o, c/w Seroquel to 50 mg daily and 100mg at bedtime for Mood/psychosis, Added Buproprion XL 150mg daily for depressive episode and Donepezil 5mg at bedtime for cognitive impairment.   diphenhydrAMINE 50 milliGRAM(s) Oral every 6 hours PRN agitation  diphenhydrAMINE Injectable 50 milliGRAM(s) IntraMuscular once PRN agitation  haloperidol     Tablet 2 milliGRAM(s) Oral every 6 hours PRN agitation  haloperidol    Injectable 2 milliGRAM(s) IntraMuscular Once PRN agitation  LORazepam     Tablet 1 milliGRAM(s) Oral every 6 hours PRN Agitation  LORazepam   Injectable 1 milliGRAM(s) IntraMuscular Once PRN Agitation  -Medical: Dry mouth; Biotene ordered   -Group and milieu therapy   -Dispo as per social work

## 2023-10-05 NOTE — BH INPATIENT PSYCHIATRY PROGRESS NOTE - NSBHCHARTREVIEWVS_PSY_A_CORE FT
Vital Signs Last 24 Hrs  T(C): 36.7 (10-05-23 @ 07:24), Max: 36.7 (10-05-23 @ 07:24)  T(F): 98.1 (10-05-23 @ 07:24), Max: 98.1 (10-05-23 @ 07:24)  HR: --  BP: --  BP(mean): --  RR: 19 (10-05-23 @ 07:24) (19 - 19)  SpO2: --    Orthostatic VS  10-05-23 @ 07:24  Lying BP: --/-- HR: --  Sitting BP: 138/78 HR: 86  Standing BP: 129/88 HR: 94  Site: --  Mode: --  Orthostatic VS  10-04-23 @ 07:41  Lying BP: --/-- HR: --  Sitting BP: 134/71 HR: 72  Standing BP: 136/68 HR: 80  Site: --  Mode: --   Vital Signs Last 24 Hrs  T(C): 36.4 (10-06-23 @ 08:41), Max: 36.4 (10-06-23 @ 08:41)  T(F): 97.5 (10-06-23 @ 08:41), Max: 97.5 (10-06-23 @ 08:41)  HR: --  BP: --  BP(mean): --  RR: --  SpO2: --    Orthostatic VS  10-06-23 @ 08:41  Lying BP: --/-- HR: --  Sitting BP: 151/74 HR: 89  Standing BP: 156/84 HR: 98  Site: --  Mode: --  Orthostatic VS  10-05-23 @ 07:24  Lying BP: --/-- HR: --  Sitting BP: 138/78 HR: 86  Standing BP: 129/88 HR: 94  Site: --  Mode: --

## 2023-10-05 NOTE — BH INPATIENT PSYCHIATRY PROGRESS NOTE - PRN MEDS
MEDICATIONS  (PRN):  acetaminophen     Tablet .. 650 milliGRAM(s) Oral every 6 hours PRN Temp greater or equal to 38C (100.4F), Mild Pain (1 - 3), Moderate Pain (4 - 6), Severe Pain (7 - 10)  aluminum hydroxide/magnesium hydroxide/simethicone Suspension 30 milliLiter(s) Oral every 6 hours PRN Dyspepsia  diphenhydrAMINE 50 milliGRAM(s) Oral every 6 hours PRN agitation/EPS  diphenhydrAMINE Injectable 50 milliGRAM(s) IntraMuscular once PRN agitation  haloperidol     Tablet 2 milliGRAM(s) Oral every 6 hours PRN agitation  haloperidol    Injectable 2 milliGRAM(s) IntraMuscular Once PRN agitation  magnesium hydroxide Suspension 30 milliLiter(s) Oral daily PRN Constipation   MEDICATIONS  (PRN):  acetaminophen     Tablet .. 650 milliGRAM(s) Oral every 6 hours PRN Temp greater or equal to 38C (100.4F), Mild Pain (1 - 3), Moderate Pain (4 - 6), Severe Pain (7 - 10)  aluminum hydroxide/magnesium hydroxide/simethicone Suspension 30 milliLiter(s) Oral every 6 hours PRN Dyspepsia  diphenhydrAMINE 50 milliGRAM(s) Oral every 6 hours PRN agitation/EPS  diphenhydrAMINE Injectable 50 milliGRAM(s) IntraMuscular once PRN agitation  haloperidol     Tablet 2 milliGRAM(s) Oral every 6 hours PRN agitation  haloperidol    Injectable 2 milliGRAM(s) IntraMuscular Once PRN agitation  LORazepam     Tablet 1 milliGRAM(s) Oral every 6 hours PRN Agitation  LORazepam   Injectable 1 milliGRAM(s) IntraMuscular Once PRN Agitation  magnesium hydroxide Suspension 30 milliLiter(s) Oral daily PRN Constipation

## 2023-10-06 PROCEDURE — 99232 SBSQ HOSP IP/OBS MODERATE 35: CPT

## 2023-10-06 RX ORDER — OLANZAPINE 15 MG/1
2.5 TABLET, FILM COATED ORAL ONCE
Refills: 0 | Status: DISCONTINUED | OUTPATIENT
Start: 2023-10-06 | End: 2023-10-24

## 2023-10-06 RX ORDER — OLANZAPINE 15 MG/1
2.5 TABLET, FILM COATED ORAL EVERY 8 HOURS
Refills: 0 | Status: DISCONTINUED | OUTPATIENT
Start: 2023-10-06 | End: 2023-10-24

## 2023-10-06 RX ADMIN — Medication 15 MILLILITER(S): at 14:19

## 2023-10-06 RX ADMIN — DONEPEZIL HYDROCHLORIDE 5 MILLIGRAM(S): 10 TABLET, FILM COATED ORAL at 20:42

## 2023-10-06 RX ADMIN — QUETIAPINE FUMARATE 50 MILLIGRAM(S): 200 TABLET, FILM COATED ORAL at 08:23

## 2023-10-06 RX ADMIN — Medication 15 MILLILITER(S): at 20:42

## 2023-10-06 RX ADMIN — DIVALPROEX SODIUM 1000 MILLIGRAM(S): 500 TABLET, DELAYED RELEASE ORAL at 20:42

## 2023-10-06 RX ADMIN — BUPROPION HYDROCHLORIDE 150 MILLIGRAM(S): 150 TABLET, EXTENDED RELEASE ORAL at 08:23

## 2023-10-06 RX ADMIN — Medication 3 MILLIGRAM(S): at 20:42

## 2023-10-06 RX ADMIN — QUETIAPINE FUMARATE 100 MILLIGRAM(S): 200 TABLET, FILM COATED ORAL at 20:42

## 2023-10-06 NOTE — BH INPATIENT PSYCHIATRY PROGRESS NOTE - NSBHCHARTREVIEWVS_PSY_A_CORE FT
Vital Signs Last 24 Hrs  T(C): 36.4 (10-06-23 @ 08:41), Max: 36.4 (10-06-23 @ 08:41)  T(F): 97.5 (10-06-23 @ 08:41), Max: 97.5 (10-06-23 @ 08:41)  HR: --  BP: --  BP(mean): --  RR: --  SpO2: --    Orthostatic VS  10-06-23 @ 08:41  Lying BP: --/-- HR: --  Sitting BP: 151/74 HR: 89  Standing BP: 156/84 HR: 98  Site: --  Mode: --  Orthostatic VS  10-05-23 @ 07:24  Lying BP: --/-- HR: --  Sitting BP: 138/78 HR: 86  Standing BP: 129/88 HR: 94  Site: --  Mode: --

## 2023-10-06 NOTE — BH INPATIENT PSYCHIATRY PROGRESS NOTE - NSBHASSESSSUMMFT_PSY_ALL_CORE
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 , no known suicide attempt or violence hx, no known legal hx, no known substance abuse history who was brought in by EMS for concerns for suicidal ideation and bipolar decompensation.    Differential Diagnosis: Bipolar Disorder, Bipolar disorder with psychotic features, Schizoaffective disorder    On assessment, patient remains disorganized and confused intermittently, had improved mood, denied SIIP, AVH.     Plan:   -Admit to [facility], 9.39 ahs been converted to a [facility]  -Routine observation q15 checks, No CO needed in a locked, supervised setting, taking meds.   -Psychiatry:   c/w Depakote ER 1000mg at bedtime for Bipolar d/o, c/w Seroquel to 50 mg daily and 100mg at bedtime for Mood/psychosis, Added Buproprion XL 150mg daily for depressive episode and Donepezil 5mg at bedtime for cognitive impairment.   diphenhydrAMINE 50 milliGRAM(s) Oral every 6 hours PRN agitation  diphenhydrAMINE Injectable 50 milliGRAM(s) IntraMuscular once PRN agitation  haloperidol     Tablet 2 milliGRAM(s) Oral every 6 hours PRN agitation  haloperidol    Injectable 2 milliGRAM(s) IntraMuscular Once PRN agitation  LORazepam     Tablet 1 milliGRAM(s) Oral every 6 hours PRN Agitation  LORazepam   Injectable 1 milliGRAM(s) IntraMuscular Once PRN Agitation  -Medical: Dry mouth; Biotene ordered   -Group and milieu therapy   -Dispo as per social work

## 2023-10-06 NOTE — BH INPATIENT PSYCHIATRY PROGRESS NOTE - PRN MEDS
MEDICATIONS  (PRN):  acetaminophen     Tablet .. 650 milliGRAM(s) Oral every 6 hours PRN Temp greater or equal to 38C (100.4F), Mild Pain (1 - 3), Moderate Pain (4 - 6), Severe Pain (7 - 10)  aluminum hydroxide/magnesium hydroxide/simethicone Suspension 30 milliLiter(s) Oral every 6 hours PRN Dyspepsia  haloperidol     Tablet 2 milliGRAM(s) Oral every 6 hours PRN agitation  magnesium hydroxide Suspension 30 milliLiter(s) Oral daily PRN Constipation  OLANZapine 2.5 milliGRAM(s) Oral every 8 hours PRN Agitation or psychosis  OLANZapine Injectable 2.5 milliGRAM(s) IntraMuscular once PRN Agitation or psychosis

## 2023-10-06 NOTE — BH INPATIENT PSYCHIATRY PROGRESS NOTE - CURRENT MEDICATION
MEDICATIONS  (STANDING):      MEDICATIONS  (PRN):  acetaminophen     Tablet .. 650 milliGRAM(s) Oral every 6 hours PRN Temp greater or equal to 38C (100.4F), Mild Pain (1 - 3), Moderate Pain (4 - 6), Severe Pain (7 - 10)  aluminum hydroxide/magnesium hydroxide/simethicone Suspension 30 milliLiter(s) Oral every 6 hours PRN Dyspepsia  haloperidol     Tablet 2 milliGRAM(s) Oral every 6 hours PRN agitation  magnesium hydroxide Suspension 30 milliLiter(s) Oral daily PRN Constipation  OLANZapine 2.5 milliGRAM(s) Oral every 8 hours PRN Agitation or psychosis  OLANZapine Injectable 2.5 milliGRAM(s) IntraMuscular once PRN Agitation or psychosis

## 2023-10-06 NOTE — BH INPATIENT PSYCHIATRY PROGRESS NOTE - NSBHFUPINTERVALHXFT_PSY_A_CORE
Patient was seen for f/u for Bipolar disorder, cognitive impairment. Chart reviewed and case discussed in team meeting. Patient admitted that she is feeling confused and as if she is in a "different time zone". Patient was not able to remember this writer, the date or the name of the hospital. She looked very confused and withdrawn. She needed encouragement to continue with the assessment but she agreed to meet with me. I had to again discuss with her the discharge plan and reassure her that her daughter and the SW have been looking in to services for her to be safe in the community. Patient is denying side effects to the medications, but at the same time stated that she has no idea what medications is she on. She was able to contract for safety and stated that she feels safe in the hospital.     Blood work has been ordered for Monday

## 2023-10-07 RX ADMIN — QUETIAPINE FUMARATE 100 MILLIGRAM(S): 200 TABLET, FILM COATED ORAL at 21:31

## 2023-10-07 RX ADMIN — DIVALPROEX SODIUM 1000 MILLIGRAM(S): 500 TABLET, DELAYED RELEASE ORAL at 21:30

## 2023-10-07 RX ADMIN — BUPROPION HYDROCHLORIDE 150 MILLIGRAM(S): 150 TABLET, EXTENDED RELEASE ORAL at 08:21

## 2023-10-07 RX ADMIN — Medication 3 MILLIGRAM(S): at 21:31

## 2023-10-07 RX ADMIN — Medication 15 MILLILITER(S): at 08:23

## 2023-10-07 RX ADMIN — DONEPEZIL HYDROCHLORIDE 5 MILLIGRAM(S): 10 TABLET, FILM COATED ORAL at 21:30

## 2023-10-07 RX ADMIN — QUETIAPINE FUMARATE 50 MILLIGRAM(S): 200 TABLET, FILM COATED ORAL at 08:21

## 2023-10-08 RX ADMIN — QUETIAPINE FUMARATE 100 MILLIGRAM(S): 200 TABLET, FILM COATED ORAL at 21:11

## 2023-10-08 RX ADMIN — Medication 3 MILLIGRAM(S): at 21:13

## 2023-10-08 RX ADMIN — DIVALPROEX SODIUM 1000 MILLIGRAM(S): 500 TABLET, DELAYED RELEASE ORAL at 21:11

## 2023-10-08 RX ADMIN — QUETIAPINE FUMARATE 50 MILLIGRAM(S): 200 TABLET, FILM COATED ORAL at 13:23

## 2023-10-08 RX ADMIN — DONEPEZIL HYDROCHLORIDE 5 MILLIGRAM(S): 10 TABLET, FILM COATED ORAL at 21:11

## 2023-10-08 RX ADMIN — BUPROPION HYDROCHLORIDE 150 MILLIGRAM(S): 150 TABLET, EXTENDED RELEASE ORAL at 13:23

## 2023-10-09 DIAGNOSIS — N28.9 DISORDER OF KIDNEY AND URETER, UNSPECIFIED: ICD-10-CM

## 2023-10-09 DIAGNOSIS — T56.894D TOXIC EFFECT OF OTHER METALS, UNDETERMINED, SUBSEQUENT ENCOUNTER: ICD-10-CM

## 2023-10-09 DIAGNOSIS — F03.90 UNSPECIFIED DEMENTIA, UNSPECIFIED SEVERITY, WITHOUT BEHAVIORAL DISTURBANCE, PSYCHOTIC DISTURBANCE, MOOD DISTURBANCE, AND ANXIETY: ICD-10-CM

## 2023-10-09 LAB
ALBUMIN SERPL ELPH-MCNC: 4.4 G/DL — SIGNIFICANT CHANGE UP (ref 3.3–5)
ALP SERPL-CCNC: 61 U/L — SIGNIFICANT CHANGE UP (ref 40–120)
ALT FLD-CCNC: 18 U/L — SIGNIFICANT CHANGE UP (ref 4–33)
ANION GAP SERPL CALC-SCNC: 11 MMOL/L — SIGNIFICANT CHANGE UP (ref 7–14)
AST SERPL-CCNC: 13 U/L — SIGNIFICANT CHANGE UP (ref 4–32)
BILIRUB SERPL-MCNC: <0.2 MG/DL — SIGNIFICANT CHANGE UP (ref 0.2–1.2)
BUN SERPL-MCNC: 19 MG/DL — SIGNIFICANT CHANGE UP (ref 7–23)
CALCIUM SERPL-MCNC: 9.5 MG/DL — SIGNIFICANT CHANGE UP (ref 8.4–10.5)
CHLORIDE SERPL-SCNC: 107 MMOL/L — SIGNIFICANT CHANGE UP (ref 98–107)
CO2 SERPL-SCNC: 26 MMOL/L — SIGNIFICANT CHANGE UP (ref 22–31)
CREAT SERPL-MCNC: 1.29 MG/DL — SIGNIFICANT CHANGE UP (ref 0.5–1.3)
EGFR: 48 ML/MIN/1.73M2 — LOW
GLUCOSE SERPL-MCNC: 199 MG/DL — HIGH (ref 70–99)
HCT VFR BLD CALC: 38.2 % — SIGNIFICANT CHANGE UP (ref 34.5–45)
HGB BLD-MCNC: 12.1 G/DL — SIGNIFICANT CHANGE UP (ref 11.5–15.5)
MCHC RBC-ENTMCNC: 29.9 PG — SIGNIFICANT CHANGE UP (ref 27–34)
MCHC RBC-ENTMCNC: 31.7 GM/DL — LOW (ref 32–36)
MCV RBC AUTO: 94.3 FL — SIGNIFICANT CHANGE UP (ref 80–100)
NRBC # BLD: 0 /100 WBCS — SIGNIFICANT CHANGE UP (ref 0–0)
NRBC # FLD: 0 K/UL — SIGNIFICANT CHANGE UP (ref 0–0)
PLATELET # BLD AUTO: 156 K/UL — SIGNIFICANT CHANGE UP (ref 150–400)
POTASSIUM SERPL-MCNC: 5.2 MMOL/L — SIGNIFICANT CHANGE UP (ref 3.5–5.3)
POTASSIUM SERPL-SCNC: 5.2 MMOL/L — SIGNIFICANT CHANGE UP (ref 3.5–5.3)
PROT SERPL-MCNC: 6.9 G/DL — SIGNIFICANT CHANGE UP (ref 6–8.3)
RBC # BLD: 4.05 M/UL — SIGNIFICANT CHANGE UP (ref 3.8–5.2)
RBC # FLD: 15 % — HIGH (ref 10.3–14.5)
SODIUM SERPL-SCNC: 144 MMOL/L — SIGNIFICANT CHANGE UP (ref 135–145)
VALPROATE SERPL-MCNC: 103.3 UG/ML — HIGH (ref 50–100)
WBC # BLD: 3.87 K/UL — SIGNIFICANT CHANGE UP (ref 3.8–10.5)
WBC # FLD AUTO: 3.87 K/UL — SIGNIFICANT CHANGE UP (ref 3.8–10.5)

## 2023-10-09 PROCEDURE — 99233 SBSQ HOSP IP/OBS HIGH 50: CPT

## 2023-10-09 RX ORDER — QUETIAPINE FUMARATE 200 MG/1
150 TABLET, FILM COATED ORAL AT BEDTIME
Refills: 0 | Status: DISCONTINUED | OUTPATIENT
Start: 2023-10-09 | End: 2023-10-10

## 2023-10-09 RX ORDER — DONEPEZIL HYDROCHLORIDE 10 MG/1
10 TABLET, FILM COATED ORAL AT BEDTIME
Refills: 0 | Status: DISCONTINUED | OUTPATIENT
Start: 2023-10-09 | End: 2023-10-24

## 2023-10-09 RX ADMIN — QUETIAPINE FUMARATE 50 MILLIGRAM(S): 200 TABLET, FILM COATED ORAL at 09:38

## 2023-10-09 RX ADMIN — DIVALPROEX SODIUM 1000 MILLIGRAM(S): 500 TABLET, DELAYED RELEASE ORAL at 21:52

## 2023-10-09 RX ADMIN — Medication 3 MILLIGRAM(S): at 21:53

## 2023-10-09 RX ADMIN — BUPROPION HYDROCHLORIDE 150 MILLIGRAM(S): 150 TABLET, EXTENDED RELEASE ORAL at 09:38

## 2023-10-09 RX ADMIN — QUETIAPINE FUMARATE 150 MILLIGRAM(S): 200 TABLET, FILM COATED ORAL at 21:51

## 2023-10-09 RX ADMIN — DONEPEZIL HYDROCHLORIDE 10 MILLIGRAM(S): 10 TABLET, FILM COATED ORAL at 21:51

## 2023-10-09 NOTE — BH INPATIENT PSYCHIATRY PROGRESS NOTE - NSBHCHARTREVIEWVS_PSY_A_CORE FT
Vital Signs Last 24 Hrs  T(C): 36.2 (10-09-23 @ 07:34), Max: 36.2 (10-09-23 @ 07:34)  T(F): 97.2 (10-09-23 @ 07:34), Max: 97.2 (10-09-23 @ 07:34)  HR: --  BP: --  BP(mean): --  RR: 18 (10-09-23 @ 07:34) (18 - 18)  SpO2: --    Orthostatic VS  10-09-23 @ 07:34  Lying BP: --/-- HR: --  Sitting BP: 158/87 HR: 110  Standing BP: 149/96 HR: 100  Site: --  Mode: --  Orthostatic VS  10-08-23 @ 09:11  Lying BP: --/-- HR: --  Sitting BP: 154/81 HR: 76  Standing BP: 147/87 HR: 86  Site: --  Mode: --

## 2023-10-09 NOTE — BH INPATIENT PSYCHIATRY PROGRESS NOTE - PRN MEDS
MEDICATIONS  (PRN):  acetaminophen     Tablet .. 650 milliGRAM(s) Oral every 6 hours PRN Temp greater or equal to 38C (100.4F), Mild Pain (1 - 3), Moderate Pain (4 - 6), Severe Pain (7 - 10)  aluminum hydroxide/magnesium hydroxide/simethicone Suspension 30 milliLiter(s) Oral every 6 hours PRN Dyspepsia  magnesium hydroxide Suspension 30 milliLiter(s) Oral daily PRN Constipation  OLANZapine 2.5 milliGRAM(s) Oral every 8 hours PRN Agitation or psychosis  OLANZapine Injectable 2.5 milliGRAM(s) IntraMuscular once PRN Agitation or psychosis

## 2023-10-09 NOTE — BH INPATIENT PSYCHIATRY PROGRESS NOTE - NSBHASSESSSUMMFT_PSY_ALL_CORE
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 , no known suicide attempt or violence hx, no known legal hx, no known substance abuse history who was brought in by EMS for concerns for suicidal ideation and bipolar decompensation.    Differential Diagnosis: Bipolar Disorder, Bipolar disorder with psychotic features, Schizoaffective disorder    On assessment, patient remains disorganized and confused intermittently, had improved mood, denied SIIP, AVH.     Plan:   -Admit to [facility], 9.39 ahs been converted to a [facility]  -Routine observation q15 checks, No CO needed in a locked, supervised setting, taking meds.   -Psychiatry:   c/w Depakote ER 1000mg at bedtime for Bipolar d/o, c/w Seroquel to 50 mg daily and increased to 150mg at bedtime for Mood/psychosis, Added Buproprion XL 150mg daily for depressive episode and increased Donepezil to 10mg at bedtime for cognitive impairment.   diphenhydrAMINE 50 milliGRAM(s) Oral every 6 hours PRN agitation  diphenhydrAMINE Injectable 50 milliGRAM(s) IntraMuscular once PRN agitation  haloperidol     Tablet 2 milliGRAM(s) Oral every 6 hours PRN agitation  haloperidol    Injectable 2 milliGRAM(s) IntraMuscular Once PRN agitation  LORazepam     Tablet 1 milliGRAM(s) Oral every 6 hours PRN Agitation  LORazepam   Injectable 1 milliGRAM(s) IntraMuscular Once PRN Agitation  -Medical: Dry mouth; Biotene ordered   -Group and milieu therapy   -Dispo as per social work

## 2023-10-09 NOTE — BH INPATIENT PSYCHIATRY PROGRESS NOTE - NSBHFUPINTERVALHXFT_PSY_A_CORE
Patient was seen for f/u for Bipolar disorder, cognitive impairment. Chart reviewed and case discussed in team meeting. Patient initially could not reported her mood, then reported she wasn't great, then toward end of interview reported "I feel great". Patient initially had trouble word finding. Patient reported she felt different on Saturday, could not elaborate what felt different, offered options and was able to identify "feeling left out" as the issue. Patient expressed concern about not being able to engage with peers. Patient was unable to state date earlier in the day, but after reviewing updated unit board, was able to remember date. Nursing staff reported patient does not sleep at night, reported she slept most of the day, feels she is sleeping at night. Patient's day/night circadian rhythm seems to be shifting. Patient denied SIIP, AVH. Patient tolerating medications, denied any side effects. Seroquel was increased to 150mg at HS and Donepezil to 10mg at HS. Labs reviewed GFR remains low at 54. Patient was seen for f/u for Bipolar disorder, cognitive impairment. Chart reviewed and case discussed in team meeting. Patient initially could not reported her mood, then reported she wasn't great, then toward end of interview reported "I feel great". Patient initially had trouble word finding. Patient reported she felt different on Saturday, could not elaborate what felt different, offered options and was able to identify "feeling left out" as the issue. Patient expressed concern about not being able to engage with peers. Patient was unable to state date earlier in the day, but after reviewing updated unit board, was able to remember date. Nursing staff reported patient does not sleep at night, reported she slept most of the day, feels she is sleeping at night. Patient's day/night circadian rhythm seems to be shifting. Patient denied SIIP, AVH. Patient tolerating medications, denied any side effects. Seroquel was increased to 150mg at HS and Donepezil to 10mg at HS. Labs reviewed GFR remains low at 48, creatinine was 1.29 and BUN was 19. Hospitalist consulted.

## 2023-10-09 NOTE — BH INPATIENT PSYCHIATRY PROGRESS NOTE - CURRENT MEDICATION
MEDICATIONS  (STANDING):  Biotene Dry Mouth Oral Rinse 15 milliLiter(s) Swish and Spit three times a day  buPROPion XL (24-Hour) 150 milliGRAM(s) Oral daily  divalproex ER 1000 milliGRAM(s) Oral at bedtime  donepezil 10 milliGRAM(s) Oral at bedtime  melatonin. 3 milliGRAM(s) Oral at bedtime  QUEtiapine 50 milliGRAM(s) Oral daily  QUEtiapine 150 milliGRAM(s) Oral at bedtime    MEDICATIONS  (PRN):  acetaminophen     Tablet .. 650 milliGRAM(s) Oral every 6 hours PRN Temp greater or equal to 38C (100.4F), Mild Pain (1 - 3), Moderate Pain (4 - 6), Severe Pain (7 - 10)  aluminum hydroxide/magnesium hydroxide/simethicone Suspension 30 milliLiter(s) Oral every 6 hours PRN Dyspepsia  magnesium hydroxide Suspension 30 milliLiter(s) Oral daily PRN Constipation  OLANZapine 2.5 milliGRAM(s) Oral every 8 hours PRN Agitation or psychosis  OLANZapine Injectable 2.5 milliGRAM(s) IntraMuscular once PRN Agitation or psychosis   MEDICATIONS  (STANDING):  Biotene Dry Mouth Oral Rinse 15 milliLiter(s) Swish and Spit three times a day  buPROPion XL (24-Hour) 150 milliGRAM(s) Oral daily  divalproex ER 1000 milliGRAM(s) Oral at bedtime  donepezil 10 milliGRAM(s) Oral at bedtime  melatonin. 3 milliGRAM(s) Oral at bedtime  QUEtiapine 150 milliGRAM(s) Oral at bedtime  QUEtiapine 50 milliGRAM(s) Oral daily    MEDICATIONS  (PRN):  acetaminophen     Tablet .. 650 milliGRAM(s) Oral every 6 hours PRN Temp greater or equal to 38C (100.4F), Mild Pain (1 - 3), Moderate Pain (4 - 6), Severe Pain (7 - 10)  aluminum hydroxide/magnesium hydroxide/simethicone Suspension 30 milliLiter(s) Oral every 6 hours PRN Dyspepsia  magnesium hydroxide Suspension 30 milliLiter(s) Oral daily PRN Constipation  OLANZapine 2.5 milliGRAM(s) Oral every 8 hours PRN Agitation or psychosis  OLANZapine Injectable 2.5 milliGRAM(s) IntraMuscular once PRN Agitation or psychosis

## 2023-10-10 LAB
ALBUMIN SERPL ELPH-MCNC: 4.1 G/DL — SIGNIFICANT CHANGE UP (ref 3.3–5)
ALP SERPL-CCNC: 53 U/L — SIGNIFICANT CHANGE UP (ref 40–120)
ALT FLD-CCNC: 21 U/L — SIGNIFICANT CHANGE UP (ref 4–33)
AMMONIA BLD-MCNC: 54 UMOL/L — SIGNIFICANT CHANGE UP (ref 11–55)
AMMONIA BLD-MCNC: 54 UMOL/L — SIGNIFICANT CHANGE UP (ref 11–55)
ANION GAP SERPL CALC-SCNC: 11 MMOL/L — SIGNIFICANT CHANGE UP (ref 7–14)
AST SERPL-CCNC: 22 U/L — SIGNIFICANT CHANGE UP (ref 4–32)
BILIRUB SERPL-MCNC: <0.2 MG/DL — SIGNIFICANT CHANGE UP (ref 0.2–1.2)
BUN SERPL-MCNC: 18 MG/DL — SIGNIFICANT CHANGE UP (ref 7–23)
CALCIUM SERPL-MCNC: 9.5 MG/DL — SIGNIFICANT CHANGE UP (ref 8.4–10.5)
CHLORIDE SERPL-SCNC: 109 MMOL/L — HIGH (ref 98–107)
CO2 SERPL-SCNC: 25 MMOL/L — SIGNIFICANT CHANGE UP (ref 22–31)
CREAT SERPL-MCNC: 1.2 MG/DL — SIGNIFICANT CHANGE UP (ref 0.5–1.3)
EGFR: 52 ML/MIN/1.73M2 — LOW
GLUCOSE SERPL-MCNC: 97 MG/DL — SIGNIFICANT CHANGE UP (ref 70–99)
HCT VFR BLD CALC: 39 % — SIGNIFICANT CHANGE UP (ref 34.5–45)
HGB BLD-MCNC: 12.6 G/DL — SIGNIFICANT CHANGE UP (ref 11.5–15.5)
MCHC RBC-ENTMCNC: 30.7 PG — SIGNIFICANT CHANGE UP (ref 27–34)
MCHC RBC-ENTMCNC: 32.3 GM/DL — SIGNIFICANT CHANGE UP (ref 32–36)
MCV RBC AUTO: 94.9 FL — SIGNIFICANT CHANGE UP (ref 80–100)
NRBC # BLD: 0 /100 WBCS — SIGNIFICANT CHANGE UP (ref 0–0)
NRBC # FLD: 0 K/UL — SIGNIFICANT CHANGE UP (ref 0–0)
PLATELET # BLD AUTO: 166 K/UL — SIGNIFICANT CHANGE UP (ref 150–400)
POTASSIUM SERPL-MCNC: 4.9 MMOL/L — SIGNIFICANT CHANGE UP (ref 3.5–5.3)
POTASSIUM SERPL-SCNC: 4.9 MMOL/L — SIGNIFICANT CHANGE UP (ref 3.5–5.3)
PROT SERPL-MCNC: 7.1 G/DL — SIGNIFICANT CHANGE UP (ref 6–8.3)
RBC # BLD: 4.11 M/UL — SIGNIFICANT CHANGE UP (ref 3.8–5.2)
RBC # FLD: 15 % — HIGH (ref 10.3–14.5)
SODIUM SERPL-SCNC: 145 MMOL/L — SIGNIFICANT CHANGE UP (ref 135–145)
WBC # BLD: 3.67 K/UL — LOW (ref 3.8–10.5)
WBC # FLD AUTO: 3.67 K/UL — LOW (ref 3.8–10.5)

## 2023-10-10 RX ORDER — QUETIAPINE FUMARATE 200 MG/1
100 TABLET, FILM COATED ORAL AT BEDTIME
Refills: 0 | Status: DISCONTINUED | OUTPATIENT
Start: 2023-10-10 | End: 2023-10-24

## 2023-10-10 RX ADMIN — BUPROPION HYDROCHLORIDE 150 MILLIGRAM(S): 150 TABLET, EXTENDED RELEASE ORAL at 09:00

## 2023-10-10 RX ADMIN — QUETIAPINE FUMARATE 50 MILLIGRAM(S): 200 TABLET, FILM COATED ORAL at 09:00

## 2023-10-10 NOTE — BH INPATIENT PSYCHIATRY PROGRESS NOTE - NSBHCHARTREVIEWVS_PSY_A_CORE FT
Vital Signs Last 24 Hrs  T(C): 36.4 (10-10-23 @ 08:08), Max: 36.4 (10-10-23 @ 08:08)  T(F): 97.6 (10-10-23 @ 08:08), Max: 97.6 (10-10-23 @ 08:08)  HR: --  BP: --  BP(mean): --  RR: 20 (10-10-23 @ 08:08) (20 - 20)  SpO2: 99% (10-10-23 @ 08:08) (99% - 99%)    Orthostatic VS  10-10-23 @ 08:08  Lying BP: --/-- HR: --  Sitting BP: 145/88 HR: 92  Standing BP: 140/84 HR: 90  Site: --  Mode: --  Orthostatic VS  10-09-23 @ 07:34  Lying BP: --/-- HR: --  Sitting BP: 158/87 HR: 110  Standing BP: 149/96 HR: 100  Site: --  Mode: --

## 2023-10-10 NOTE — BH INPATIENT PSYCHIATRY PROGRESS NOTE - CURRENT MEDICATION
MEDICATIONS  (STANDING):  Biotene Dry Mouth Oral Rinse 15 milliLiter(s) Swish and Spit three times a day  buPROPion XL (24-Hour) 150 milliGRAM(s) Oral daily  divalproex ER 1000 milliGRAM(s) Oral at bedtime  donepezil 10 milliGRAM(s) Oral at bedtime  melatonin. 3 milliGRAM(s) Oral at bedtime  QUEtiapine 100 milliGRAM(s) Oral at bedtime  QUEtiapine 50 milliGRAM(s) Oral daily    MEDICATIONS  (PRN):  acetaminophen     Tablet .. 650 milliGRAM(s) Oral every 6 hours PRN Temp greater or equal to 38C (100.4F), Mild Pain (1 - 3), Moderate Pain (4 - 6), Severe Pain (7 - 10)  aluminum hydroxide/magnesium hydroxide/simethicone Suspension 30 milliLiter(s) Oral every 6 hours PRN Dyspepsia  magnesium hydroxide Suspension 30 milliLiter(s) Oral daily PRN Constipation  OLANZapine 2.5 milliGRAM(s) Oral every 8 hours PRN Agitation or psychosis  OLANZapine Injectable 2.5 milliGRAM(s) IntraMuscular once PRN Agitation or psychosis

## 2023-10-10 NOTE — BH INPATIENT PSYCHIATRY PROGRESS NOTE - ATTENDING COMMENTS
Care was discussed and reviewed in interdisciplinary treatment team.  I, Veronika Gonzalez MD, have reviewed and verified the documentation.  I independently performed the documented medical decision making. Care was discussed and reviewed in interdisciplinary treatment team.  I, Veronika Gonzalez MD, have reviewed and verified the documentation.  I independently performed the documented medical decision making.    Patient remains confused to date, place and situation. She has been isolating in her room and refused to meet with this writer. Patient was able to recognized this writer. Case has been discussed with the NP and blood work has been ordered to assess if this increase confusion is related to any medical issue going on.

## 2023-10-10 NOTE — BH INPATIENT PSYCHIATRY PROGRESS NOTE - NSBHASSESSSUMMFT_PSY_ALL_CORE
Patient is a 57y/o F  but in a relationship, domiciled w/  daughter, her boyfriend, employed as a transport dispatch at [employer], w/ PPHx of bipolar d/o followed in [facility], w/ 5 prior psychiatric hospitalizations most recently discharged from [facility] 9/7/23 for montserrat (9.13 admission, described as irritability, sleep disturbances, racing thoughts, restlessness), as well as psychotic symptoms (reported to be non command auditory hallucinations, delusional ideas of reference). , no known suicide attempt or violence hx, no known legal hx, no known substance abuse history who was brought in by EMS for concerns for suicidal ideation and bipolar decompensation.    Differential Diagnosis: Bipolar Disorder, Bipolar disorder with psychotic features, Schizoaffective disorder    On assessment, patient remains disorganized and confused intermittently, had improved mood, denied SIIP, AVH.     Plan:   -Admit to [facility], 9.39 ahs been converted to a [facility]  -Routine observation q15 checks, No CO needed in a locked, supervised setting, taking meds.   -Psychiatry:   c/w Depakote ER 1000mg at bedtime for Bipolar d/o, c/w Seroquel to 50 mg daily and increased to 150mg at bedtime for Mood/psychosis, Added Buproprion XL 150mg daily for depressive episode and increased Donepezil to 10mg at bedtime for cognitive impairment.   diphenhydrAMINE 50 milliGRAM(s) Oral every 6 hours PRN agitation  diphenhydrAMINE Injectable 50 milliGRAM(s) IntraMuscular once PRN agitation  haloperidol     Tablet 2 milliGRAM(s) Oral every 6 hours PRN agitation  haloperidol    Injectable 2 milliGRAM(s) IntraMuscular Once PRN agitation  LORazepam     Tablet 1 milliGRAM(s) Oral every 6 hours PRN Agitation  LORazepam   Injectable 1 milliGRAM(s) IntraMuscular Once PRN Agitation  -Medical: Dry mouth; Biotene ordered   -Group and milieu therapy   -Dispo as per social work     Patient is a 59y/o F  but in a relationship, domiciled w/  daughter, her boyfriend, 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 , no known suicide attempt or violence hx, no known legal hx, no known substance abuse history who was brought in by EMS for concerns for suicidal ideation and bipolar decompensation.    Differential Diagnosis: Bipolar Disorder, Bipolar disorder with psychotic features, Schizoaffective disorder    On assessment, patient was more disorganized and confused today, has low mood, denied SIIP, AVH.     Plan:   -Admit to [facility], 9.39 ahs been converted to a [facility]  -Routine observation q15 checks, No CO needed in a locked, supervised setting, taking meds.   -Psychiatry:   c/w Depakote ER 1000mg at bedtime for Bipolar d/o, c/w Seroquel to 50 mg daily and decreased to 100mg at bedtime for Mood/psychosis, c/w Buproprion XL 150mg daily for depressive episode and c/w Donepezil 10mg at bedtime for cognitive impairment.   diphenhydrAMINE 50 milliGRAM(s) Oral every 6 hours PRN agitation  diphenhydrAMINE Injectable 50 milliGRAM(s) IntraMuscular once PRN agitation  haloperidol     Tablet 2 milliGRAM(s) Oral every 6 hours PRN agitation  haloperidol    Injectable 2 milliGRAM(s) IntraMuscular Once PRN agitation  LORazepam     Tablet 1 milliGRAM(s) Oral every 6 hours PRN Agitation  LORazepam   Injectable 1 milliGRAM(s) IntraMuscular Once PRN Agitation  -Medical: Dry mouth; Biotene ordered   -Group and milieu therapy   -Dispo as per social work

## 2023-10-10 NOTE — BH INPATIENT PSYCHIATRY PROGRESS NOTE - NSBHFUPINTERVALHXFT_PSY_A_CORE
Patient was seen for f/u for Bipolar disorder, cognitive impairment. Chart reviewed and case discussed in team meeting. Patient was in room most of the day, not responding when called, refused to get up for interview. Patient did get up for meals. Patient  Patient was seen for f/u for Bipolar disorder, cognitive impairment. Chart reviewed and case discussed in team meeting. Patient was in room most of the day, not responding when called, refused to get up for interview. Patient did get up for meals. Per nursing staff patient believed someone was in her room and pointed to white bags and asked "take her out". Labs ordered to investigate for any changes, CMP, CBC, UA, Ammonia level. Will monitor results.

## 2023-10-11 ENCOUNTER — EMERGENCY (EMERGENCY)
Facility: HOSPITAL | Age: 58
LOS: 1 days | Discharge: ANOTHER HOSPITAL | End: 2023-10-11
Attending: EMERGENCY MEDICINE | Admitting: EMERGENCY MEDICINE
Payer: MEDICAID

## 2023-10-11 VITALS
RESPIRATION RATE: 18 BRPM | TEMPERATURE: 98 F | OXYGEN SATURATION: 99 % | DIASTOLIC BLOOD PRESSURE: 84 MMHG | SYSTOLIC BLOOD PRESSURE: 127 MMHG | HEIGHT: 67 IN | HEART RATE: 82 BPM

## 2023-10-11 LAB
ALBUMIN SERPL ELPH-MCNC: 4.5 G/DL — SIGNIFICANT CHANGE UP (ref 3.3–5)
ALP SERPL-CCNC: 69 U/L — SIGNIFICANT CHANGE UP (ref 40–120)
ALT FLD-CCNC: 26 U/L — SIGNIFICANT CHANGE UP (ref 4–33)
ANION GAP SERPL CALC-SCNC: 12 MMOL/L — SIGNIFICANT CHANGE UP (ref 7–14)
APPEARANCE UR: CLEAR — SIGNIFICANT CHANGE UP
AST SERPL-CCNC: 31 U/L — SIGNIFICANT CHANGE UP (ref 4–32)
BASOPHILS # BLD AUTO: 0.02 K/UL — SIGNIFICANT CHANGE UP (ref 0–0.2)
BASOPHILS NFR BLD AUTO: 0.5 % — SIGNIFICANT CHANGE UP (ref 0–2)
BILIRUB SERPL-MCNC: 0.2 MG/DL — SIGNIFICANT CHANGE UP (ref 0.2–1.2)
BILIRUB UR-MCNC: NEGATIVE — SIGNIFICANT CHANGE UP
BUN SERPL-MCNC: 21 MG/DL — SIGNIFICANT CHANGE UP (ref 7–23)
CALCIUM SERPL-MCNC: 10 MG/DL — SIGNIFICANT CHANGE UP (ref 8.4–10.5)
CHLORIDE SERPL-SCNC: 109 MMOL/L — HIGH (ref 98–107)
CO2 SERPL-SCNC: 24 MMOL/L — SIGNIFICANT CHANGE UP (ref 22–31)
COLOR SPEC: YELLOW — SIGNIFICANT CHANGE UP
CREAT SERPL-MCNC: 1.27 MG/DL — SIGNIFICANT CHANGE UP (ref 0.5–1.3)
DIFF PNL FLD: NEGATIVE — SIGNIFICANT CHANGE UP
EGFR: 48 ML/MIN/1.73M2 — LOW
EOSINOPHIL # BLD AUTO: 0.05 K/UL — SIGNIFICANT CHANGE UP (ref 0–0.5)
EOSINOPHIL NFR BLD AUTO: 1.2 % — SIGNIFICANT CHANGE UP (ref 0–6)
GLUCOSE SERPL-MCNC: 86 MG/DL — SIGNIFICANT CHANGE UP (ref 70–99)
GLUCOSE UR QL: NEGATIVE MG/DL — SIGNIFICANT CHANGE UP
HCT VFR BLD CALC: 43.9 % — SIGNIFICANT CHANGE UP (ref 34.5–45)
HGB BLD-MCNC: 13.6 G/DL — SIGNIFICANT CHANGE UP (ref 11.5–15.5)
IANC: 1.76 K/UL — LOW (ref 1.8–7.4)
IMM GRANULOCYTES NFR BLD AUTO: 0.2 % — SIGNIFICANT CHANGE UP (ref 0–0.9)
KETONES UR-MCNC: NEGATIVE MG/DL — SIGNIFICANT CHANGE UP
LEUKOCYTE ESTERASE UR-ACNC: NEGATIVE — SIGNIFICANT CHANGE UP
LYMPHOCYTES # BLD AUTO: 1.84 K/UL — SIGNIFICANT CHANGE UP (ref 1–3.3)
LYMPHOCYTES # BLD AUTO: 43.9 % — SIGNIFICANT CHANGE UP (ref 13–44)
MCHC RBC-ENTMCNC: 29.6 PG — SIGNIFICANT CHANGE UP (ref 27–34)
MCHC RBC-ENTMCNC: 31 GM/DL — LOW (ref 32–36)
MCV RBC AUTO: 95.6 FL — SIGNIFICANT CHANGE UP (ref 80–100)
MONOCYTES # BLD AUTO: 0.51 K/UL — SIGNIFICANT CHANGE UP (ref 0–0.9)
MONOCYTES NFR BLD AUTO: 12.2 % — SIGNIFICANT CHANGE UP (ref 2–14)
NEUTROPHILS # BLD AUTO: 1.76 K/UL — LOW (ref 1.8–7.4)
NEUTROPHILS NFR BLD AUTO: 42 % — LOW (ref 43–77)
NITRITE UR-MCNC: NEGATIVE — SIGNIFICANT CHANGE UP
NRBC # BLD: 0 /100 WBCS — SIGNIFICANT CHANGE UP (ref 0–0)
NRBC # FLD: 0 K/UL — SIGNIFICANT CHANGE UP (ref 0–0)
PH UR: 7.5 — SIGNIFICANT CHANGE UP (ref 5–8)
PLATELET # BLD AUTO: 159 K/UL — SIGNIFICANT CHANGE UP (ref 150–400)
POTASSIUM SERPL-MCNC: 5.2 MMOL/L — SIGNIFICANT CHANGE UP (ref 3.5–5.3)
POTASSIUM SERPL-SCNC: 5.2 MMOL/L — SIGNIFICANT CHANGE UP (ref 3.5–5.3)
PROT SERPL-MCNC: 7.9 G/DL — SIGNIFICANT CHANGE UP (ref 6–8.3)
PROT UR-MCNC: NEGATIVE MG/DL — SIGNIFICANT CHANGE UP
RBC # BLD: 4.59 M/UL — SIGNIFICANT CHANGE UP (ref 3.8–5.2)
RBC # FLD: 15.2 % — HIGH (ref 10.3–14.5)
SODIUM SERPL-SCNC: 145 MMOL/L — SIGNIFICANT CHANGE UP (ref 135–145)
SP GR SPEC: 1.01 — SIGNIFICANT CHANGE UP (ref 1–1.03)
UROBILINOGEN FLD QL: 0.2 MG/DL — SIGNIFICANT CHANGE UP (ref 0.2–1)
WBC # BLD: 4.19 K/UL — SIGNIFICANT CHANGE UP (ref 3.8–10.5)
WBC # FLD AUTO: 4.19 K/UL — SIGNIFICANT CHANGE UP (ref 3.8–10.5)

## 2023-10-11 PROCEDURE — 99284 EMERGENCY DEPT VISIT MOD MDM: CPT

## 2023-10-11 RX ORDER — SODIUM CHLORIDE 9 MG/ML
1000 INJECTION INTRAMUSCULAR; INTRAVENOUS; SUBCUTANEOUS ONCE
Refills: 0 | Status: COMPLETED | OUTPATIENT
Start: 2023-10-11 | End: 2023-10-11

## 2023-10-11 RX ADMIN — DONEPEZIL HYDROCHLORIDE 10 MILLIGRAM(S): 10 TABLET, FILM COATED ORAL at 22:39

## 2023-10-11 RX ADMIN — BUPROPION HYDROCHLORIDE 150 MILLIGRAM(S): 150 TABLET, EXTENDED RELEASE ORAL at 10:12

## 2023-10-11 RX ADMIN — DIVALPROEX SODIUM 1000 MILLIGRAM(S): 500 TABLET, DELAYED RELEASE ORAL at 22:39

## 2023-10-11 RX ADMIN — QUETIAPINE FUMARATE 100 MILLIGRAM(S): 200 TABLET, FILM COATED ORAL at 22:39

## 2023-10-11 RX ADMIN — QUETIAPINE FUMARATE 50 MILLIGRAM(S): 200 TABLET, FILM COATED ORAL at 10:13

## 2023-10-11 RX ADMIN — Medication 3 MILLIGRAM(S): at 22:40

## 2023-10-11 NOTE — ED PROVIDER NOTE - CLINICAL SUMMARY MEDICAL DECISION MAKING FREE TEXT BOX
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  Not in acute distress, A&Ox1, answering questions, following commands now.   Plan:  - CBC, CMP, UA  - Reassess the needs for IVF

## 2023-10-11 NOTE — BH CHART NOTE - NSEVENTNOTEFT_PSY_ALL_CORE
Patient was seen with attending. Patient not engaging, appears lethargic, non verbal, startled when touched, appears to not recognize staff even though she has been in the hospital for almost a month, not getting up for meals/vitals. Patient started to show symptoms of altered mental status yesterday but was able to speak and come out for meals. Labs were ordered yesterday and were unremarkable, Ammonia level was 54, renal function remains diminished. Patient is being sent to ED for work for Altered mental status. Daughter Rozina was notified ([phone]).

## 2023-10-11 NOTE — ED PROVIDER NOTE - PROGRESS NOTE DETAILS
MD Dian (PGY-2) MD Dian (PGY-2) Received sign out on patient. 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  Patient's labs reviewed, no acute findings.  Patient is back to baseline on evaluation in the ED.  Patient to be sent back to her [facility] for further management.  Spoke with PA hospitalist at [facility].  Patient to be discharged.

## 2023-10-11 NOTE — PROVIDER CONTACT NOTE (OTHER) - ASSESSMENT
Writer outreached [facility]: [id number] and spoke with WILMA Santoyo, in order to inform of pt's return to facility. Writer additionally outreached [facility]: [phone] and scheduled BLS transport for 10/11/23 with a 60-90 min p/u time | Trip # [id number]. Reps: [employer] Cast[employer].

## 2023-10-11 NOTE — ED ADULT TRIAGE NOTE - HEIGHT IN INCHES
Pt notified of message per Dr. Traci Lanier and voiced understanding of what was read to her. She stated that she would like to have the MRI of her brain. Patient would also like to know if her lab work was okay ? 7

## 2023-10-11 NOTE — ED PROVIDER NOTE - NS ED ROS FT
Patient denies any chest pain, SOB, cough, fever, chills, headache, abdominal pain, N/V/D, dysuria, hematuria. ROS:  GENERAL: No fever, no chills  EYES: no change in vision  HEENT: no trouble swallowing, no trouble speaking  CARDIAC: no chest pain  PULMONARY: no cough, no shortness of breath  GI: no abdominal pain, no nausea, no vomiting, no diarrhea, no constipation  : No dysuria, no frequency, no change in appearance, or odor of urine  SKIN: no rashes  NEURO: no headache, no weakness  MSK: No joint pain

## 2023-10-11 NOTE — ED PROVIDER NOTE - NSFOLLOWUPINSTRUCTIONS_ED_ALL_ED_FT
You were seen in the Emergency Department for altered mental status.  You received lab work which not find an acute reason for your symptoms.  Please follow-up with your doctors at [facility].    1) Advance activity as tolerated.   2) Continue all previously prescribed medications as directed.    3) Follow up with your doctors at [facility]  take copies of your results.    4) Return to the Emergency Department for worsening or persistent symptoms, and/or ANY NEW OR CONCERNING SYMPTOMS.

## 2023-10-11 NOTE — ED PROVIDER NOTE - PHYSICAL EXAMINATION
GEN: resting in bed comfortably, not in acute distress  HEENT: Atraumatic, normocephalic, EMOI, PERRLA, MMM, no lymphadenopathy  Neuro:  was not responding to questions at first, but slowly started following commands.  CN II - XII grossly intact  CV: RRR, +S1/S2, no murmurs appreciated  Pulm: Chest wall without deformity, CTA in all lung fields bilaterally, no Wheeze/Rales/Rhonchi  Abd: normoactive BS, soft, NT, ND, no organomegaly, no guarding.   Extremities: motor and sensory intact, strength to BUE 4/5, strength to BLE 5/5, 2+Peripheral pulses, skin intact, no rashes, no cyanosis, but cold and appears pale on fingers and toes.

## 2023-10-11 NOTE — ED ADULT NURSE NOTE - CHIEF COMPLAINT QUOTE
pt coming by ambulance from [facility]. pt sent to ED because she is not recognizing staff members, not eating or drinking. pt sent for evaluation.  hx-bipolar, depression, dementia secondary to lithium toxicity

## 2023-10-11 NOTE — ED PROVIDER NOTE - ATTENDING CONTRIBUTION TO CARE
Patient transferred from [facility] for reasons described w/u negative pat improved over time was able to walk over to me accurately recall her daughters cell phone which I dialed for her to talk  vss  rest of PE normal   d/c [facility] with instructions to RTED PRN

## 2023-10-11 NOTE — ED PROVIDER NOTE - OBJECTIVE STATEMENT
58-year-old female with PMH of Bipolar, GDD, Dementia 2/2 lithium toxicity was brought in by ambulance from [facility] for altered mental status.  Chart reviewed, patient appeared lethargic, non-verbal, startled when touched, not recognize staff, no improvement since the admission to [facility] on 2023.  Patient was not eating or drinking and showed AMS symptoms yesterday, labs done in [facility] showed elevated Ammonia at 54 and diminished renal function.  Patient is sent to ED for evaluation today.    In ED, Patient was responding to questions at first, looking in space at first, but slowly started answering questions and following commands.  Patient can states her name, but not able to tell her , time, place.  Patient states that she feels sad, but denies any intention to harm herself.  Patient reports she ate dinner last night, but has not eaten anything since this morning.  Patient denies any chest pain, SOB, coughs, fever, chills, headache, abdominal pain, N/V/D, dysuria, hematuria. Do not know

## 2023-10-11 NOTE — ED ADULT TRIAGE NOTE - CHIEF COMPLAINT QUOTE
pt coming by ambulance from [facility]. pt sent to ED because she is not recognizing staff members, not eating or drinking. pt sent for evaluation.  hx-bipolar, depression, dementia secondary to lithium toxicity

## 2023-10-11 NOTE — ED PROVIDER NOTE - PATIENT PORTAL LINK FT
You can access the FollowMyHealth Patient Portal offered by [facility] by registering at the following website: [email or web address]. By joining [employer]’s FollowMyHealth portal, you will also be able to view your health information using other applications (apps) compatible with our system.

## 2023-10-11 NOTE — PROVIDER CONTACT NOTE (OTHER) - BACKGROUND
Covering SW informed by provider pt medically cleared for return to [facility] (2W).

## 2023-10-11 NOTE — ED ADULT NURSE NOTE - NSFALLHARMRISKINTERV_ED_ALL_ED
Assistance OOB with selected safe patient handling equipment if applicable/Assistance with ambulation/Communicate risk of Fall with Harm to all staff, patient, and family/Monitor gait and stability/Monitor for mental status changes and reorient to person, place, and time, as needed/Provide visual cue: red socks, yellow wristband, yellow gown, etc/Reinforce activity limits and safety measures with patient and family/Toileting schedule using arm’s reach rule for commode and bathroom/Use of alarms - bed, stretcher, chair and/or video monitoring/Bed in lowest position, wheels locked, appropriate side rails in place/Call bell, personal items and telephone in reach/Instruct patient to call for assistance before getting out of bed/chair/stretcher/Non-slip footwear applied when patient is off stretcher/[place] to call system/Physically safe environment - no spills, clutter or unnecessary equipment/Purposeful Proactive Rounding/Room/bathroom lighting operational, light cord in reach

## 2023-10-11 NOTE — ED ADULT NURSE NOTE - OBJECTIVE STATEMENT
Receive pt. in ER 8a presenting to the ER from [facility] for change in mental status.. Pt. is accompanied by staff, pt. sent because of poor po intake and not recognizing staff. Pt. is awake alert and oriented x 2, no c/o pain, no c/o shortness of breath. Pt. is calm and cooperative, will continue to monitor. Pt. is from [facility]

## 2023-10-12 PROCEDURE — 99232 SBSQ HOSP IP/OBS MODERATE 35: CPT

## 2023-10-12 RX ADMIN — DIVALPROEX SODIUM 1000 MILLIGRAM(S): 500 TABLET, DELAYED RELEASE ORAL at 20:23

## 2023-10-12 RX ADMIN — DONEPEZIL HYDROCHLORIDE 10 MILLIGRAM(S): 10 TABLET, FILM COATED ORAL at 20:23

## 2023-10-12 RX ADMIN — BUPROPION HYDROCHLORIDE 150 MILLIGRAM(S): 150 TABLET, EXTENDED RELEASE ORAL at 08:25

## 2023-10-12 RX ADMIN — QUETIAPINE FUMARATE 100 MILLIGRAM(S): 200 TABLET, FILM COATED ORAL at 20:24

## 2023-10-12 RX ADMIN — Medication 3 MILLIGRAM(S): at 20:23

## 2023-10-12 RX ADMIN — QUETIAPINE FUMARATE 50 MILLIGRAM(S): 200 TABLET, FILM COATED ORAL at 08:26

## 2023-10-12 NOTE — BH INPATIENT PSYCHIATRY PROGRESS NOTE - ATTENDING COMMENTS
Care was discussed and reviewed in interdisciplinary treatment team.  I, Veronika Gonzalez MD, have reviewed and verified the documentation.  I independently performed the documented medical decision making.    Patient remains confused to date, place and situation. She has been isolating in her room and refused to meet with this writer. Patient was able to recognized this writer. Case has been discussed with the NP and blood work has been ordered to assess if this increase confusion is related to any medical issue going on. Care was discussed and reviewed in interdisciplinary treatment team.  I, Veronika Gonzalez MD, have reviewed and verified the documentation.  I independently performed the documented medical decision making.    Patient seemed better today. She was able to recognized this writer. Patient remember going to the ER and feeling confused. Stated that she is feeling better now. Said that she wants to be good and be able to return home with her daughter. Again I review with the patient the discharge plan. She is in agreement with the increase in services.

## 2023-10-12 NOTE — BH INPATIENT PSYCHIATRY PROGRESS NOTE - NSBHASSESSSUMMFT_PSY_ALL_CORE
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 , no known suicide attempt or violence hx, no known legal hx, no known substance abuse history who was brought in by EMS for concerns for suicidal ideation and bipolar decompensation.    Differential Diagnosis: Bipolar Disorder, Bipolar disorder with psychotic features, Schizoaffective disorder    On assessment, patient was disorganized, engaging better, more verbal , had difficulty with word finding, denied SIIP.     Plan:   -Admit to [facility], 9.39 ahs been converted to a 2PC  -Routine observation q15 checks, No CO needed in a locked, supervised setting, taking meds.   -Psychiatry:   c/w Depakote ER 1000mg at bedtime for Bipolar d/o, c/w Seroquel to 50 mg daily and decreased to 100mg at bedtime for Mood/psychosis, c/w Buproprion XL 150mg daily for depressive episode and c/w Donepezil 10mg at bedtime for cognitive impairment.   diphenhydrAMINE 50 milliGRAM(s) Oral every 6 hours PRN agitation  diphenhydrAMINE Injectable 50 milliGRAM(s) IntraMuscular once PRN agitation  haloperidol     Tablet 2 milliGRAM(s) Oral every 6 hours PRN agitation  haloperidol    Injectable 2 milliGRAM(s) IntraMuscular Once PRN agitation  LORazepam     Tablet 1 milliGRAM(s) Oral every 6 hours PRN Agitation  LORazepam   Injectable 1 milliGRAM(s) IntraMuscular Once PRN Agitation  -Medical: Dry mouth; Biotene ordered   -Group and milieu therapy   -Dispo as per social work

## 2023-10-12 NOTE — BH INPATIENT PSYCHIATRY PROGRESS NOTE - CURRENT MEDICATION
MEDICATIONS  (STANDING):  Biotene Dry Mouth Oral Rinse 15 milliLiter(s) Swish and Spit three times a day  buPROPion XL (24-Hour) 150 milliGRAM(s) Oral daily  divalproex ER 1000 milliGRAM(s) Oral at bedtime  donepezil 10 milliGRAM(s) Oral at bedtime  melatonin. 3 milliGRAM(s) Oral at bedtime  QUEtiapine 50 milliGRAM(s) Oral daily  QUEtiapine 100 milliGRAM(s) Oral at bedtime    MEDICATIONS  (PRN):  acetaminophen     Tablet .. 650 milliGRAM(s) Oral every 6 hours PRN Temp greater or equal to 38C (100.4F), Mild Pain (1 - 3), Moderate Pain (4 - 6), Severe Pain (7 - 10)  aluminum hydroxide/magnesium hydroxide/simethicone Suspension 30 milliLiter(s) Oral every 6 hours PRN Dyspepsia  magnesium hydroxide Suspension 30 milliLiter(s) Oral daily PRN Constipation  OLANZapine 2.5 milliGRAM(s) Oral every 8 hours PRN Agitation or psychosis  OLANZapine Injectable 2.5 milliGRAM(s) IntraMuscular once PRN Agitation or psychosis

## 2023-10-12 NOTE — BH INPATIENT PSYCHIATRY PROGRESS NOTE - NSBHCHARTREVIEWVS_PSY_A_CORE FT
Vital Signs Last 24 Hrs  T(C): 36.3 (10-12-23 @ 07:25), Max: 36.4 (10-11-23 @ 22:40)  T(F): 97.3 (10-12-23 @ 07:25), Max: 97.6 (10-11-23 @ 22:40)  HR: --  BP: --  BP(mean): --  RR: 17 (10-12-23 @ 07:25) (17 - 18)  SpO2: 99% (10-12-23 @ 07:25) (99% - 99%)    Orthostatic VS  10-12-23 @ 07:25  Lying BP: --/-- HR: --  Sitting BP: 126/72 HR: 99  Standing BP: 118/80 HR: 106  Site: --  Mode: --  Orthostatic VS  10-11-23 @ 22:40  Lying BP: --/-- HR: --  Sitting BP: 139/84 HR: 83  Standing BP: 137/96 HR: 87  Site: --  Mode: --

## 2023-10-12 NOTE — BH INPATIENT PSYCHIATRY PROGRESS NOTE - NSBHMETABOLIC_PSY_ALL_CORE_FT
BMI: BMI (kg/m2): 29.3 (10-11-23 @ 13:25)  HbA1c: A1C with Estimated Average Glucose Result: 5.5 % (08-25-23 @ 13:32)    Glucose: POCT Blood Glucose.: 105 mg/dL (09-13-23 @ 23:21)    BP: 143/92 (10-11-23 @ 12:30) (143/92 - 143/92)  Lipid Panel: Date/Time: 05-26-23 @ 13:30  Cholesterol, Serum: 159  Direct LDL: --  HDL Cholesterol, Serum: 33  Total Cholesterol/HDL Ration Measurement: --  Triglycerides, Serum: 197

## 2023-10-12 NOTE — BH INPATIENT PSYCHIATRY PROGRESS NOTE - NSBHFUPINTERVALHXFT_PSY_A_CORE
Patient was seen for f/u for Bipolar disorder, cognitive impairment. Chart reviewed and case discussed in team meeting. Patient received to unit from ED, alert, ambulating, interacting appropriately. Patient was able to expressed she noticed a change in her function yesterday but could not understand why. Patient reported she "felt a certain way" when in the ED, could not elaborate or verbalize her thoughts. Patient reported she is feeling "normal", denied any concerns. Patient was placed on intakes to monitor food and fluids intakes to prevent dehydration. Discussed with patient current diagnosis of Dementia and the need to sufficient hydration and intake, adequate sleep and the need for continued services. Patient reported she wants to participate more, was seen sitting near group activity but not interacting. Patient denied any SIIP. Patient could not understand or answer regarding hallucinations then stated "I'm fine". Patient continues to have difficulty with memory, functioning and communication.

## 2023-10-13 LAB
CULTURE RESULTS: SIGNIFICANT CHANGE UP
SPECIMEN SOURCE: SIGNIFICANT CHANGE UP

## 2023-10-13 PROCEDURE — 99232 SBSQ HOSP IP/OBS MODERATE 35: CPT

## 2023-10-13 RX ADMIN — DIVALPROEX SODIUM 1000 MILLIGRAM(S): 500 TABLET, DELAYED RELEASE ORAL at 21:11

## 2023-10-13 RX ADMIN — QUETIAPINE FUMARATE 50 MILLIGRAM(S): 200 TABLET, FILM COATED ORAL at 09:36

## 2023-10-13 RX ADMIN — QUETIAPINE FUMARATE 100 MILLIGRAM(S): 200 TABLET, FILM COATED ORAL at 21:11

## 2023-10-13 RX ADMIN — DONEPEZIL HYDROCHLORIDE 10 MILLIGRAM(S): 10 TABLET, FILM COATED ORAL at 21:11

## 2023-10-13 RX ADMIN — Medication 3 MILLIGRAM(S): at 21:11

## 2023-10-13 RX ADMIN — BUPROPION HYDROCHLORIDE 150 MILLIGRAM(S): 150 TABLET, EXTENDED RELEASE ORAL at 09:36

## 2023-10-13 NOTE — BH INPATIENT PSYCHIATRY PROGRESS NOTE - NSBHCHARTREVIEWVS_PSY_A_CORE FT
Vital Signs Last 24 Hrs  T(C): 36.6 (10-13-23 @ 07:31), Max: 36.6 (10-13-23 @ 07:31)  T(F): 97.8 (10-13-23 @ 07:31), Max: 97.8 (10-13-23 @ 07:31)  HR: --  BP: --  BP(mean): --  RR: 20 (10-13-23 @ 07:31) (20 - 20)  SpO2: --    Orthostatic VS  10-13-23 @ 07:31  Lying BP: --/-- HR: --  Sitting BP: 158/87 HR: 99  Standing BP: 155/96 HR: 109  Site: --  Mode: --  Orthostatic VS  10-12-23 @ 07:25  Lying BP: --/-- HR: --  Sitting BP: 126/72 HR: 99  Standing BP: 118/80 HR: 106  Site: --  Mode: --  Orthostatic VS  10-11-23 @ 22:40  Lying BP: --/-- HR: --  Sitting BP: 139/84 HR: 83  Standing BP: 137/96 HR: 87  Site: --  Mode: --

## 2023-10-13 NOTE — BH INPATIENT PSYCHIATRY PROGRESS NOTE - NSBHASSESSSUMMFT_PSY_ALL_CORE
Second attempt at calling patient to introduce myself as her SCP . She stated she was pulling into the car wash and asked that I call back another day. I advised that I would. If the patient calls back, please transfer to [phone].    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 , no known suicide attempt or violence hx, no known legal hx, no known substance abuse history who was brought in by EMS for concerns for suicidal ideation and bipolar decompensation.    Differential Diagnosis: Bipolar Disorder, Bipolar disorder with psychotic features, Schizoaffective disorder    On assessment, patient was disorganized, irritable, more verbal, had difficulty with word finding, denied SIIP.     Plan:   -Admit to [facility], 9.39 ahs been converted to a 2PC  -Routine observation q15 checks, No CO needed in a locked, supervised setting, taking meds.   -Psychiatry:   c/w Depakote ER 1000mg at bedtime for Bipolar d/o, c/w Seroquel to 50 mg daily and decreased to 100mg at bedtime for Mood/psychosis, c/w Buproprion XL 150mg daily for depressive episode and c/w Donepezil 10mg at bedtime for cognitive impairment.   diphenhydrAMINE 50 milliGRAM(s) Oral every 6 hours PRN agitation  diphenhydrAMINE Injectable 50 milliGRAM(s) IntraMuscular once PRN agitation  haloperidol     Tablet 2 milliGRAM(s) Oral every 6 hours PRN agitation  haloperidol    Injectable 2 milliGRAM(s) IntraMuscular Once PRN agitation  LORazepam     Tablet 1 milliGRAM(s) Oral every 6 hours PRN Agitation  LORazepam   Injectable 1 milliGRAM(s) IntraMuscular Once PRN Agitation  -Medical: Dry mouth; Biotene ordered   -Group and milieu therapy   -Dispo as per social work     10-Apr-2017 12:20

## 2023-10-13 NOTE — BH INPATIENT PSYCHIATRY PROGRESS NOTE - CURRENT MEDICATION
MEDICATIONS  (STANDING):  Biotene Dry Mouth Oral Rinse 15 milliLiter(s) Swish and Spit three times a day  buPROPion XL (24-Hour) 150 milliGRAM(s) Oral daily  divalproex ER 1000 milliGRAM(s) Oral at bedtime  donepezil 10 milliGRAM(s) Oral at bedtime  melatonin. 3 milliGRAM(s) Oral at bedtime  QUEtiapine 50 milliGRAM(s) Oral daily  QUEtiapine 100 milliGRAM(s) Oral at bedtime    MEDICATIONS  (PRN):  acetaminophen     Tablet .. 650 milliGRAM(s) Oral every 6 hours PRN Temp greater or equal to 38C (100.4F), Mild Pain (1 - 3), Moderate Pain (4 - 6), Severe Pain (7 - 10)  aluminum hydroxide/magnesium hydroxide/simethicone Suspension 30 milliLiter(s) Oral every 6 hours PRN Dyspepsia  magnesium hydroxide Suspension 30 milliLiter(s) Oral daily PRN Constipation  OLANZapine 2.5 milliGRAM(s) Oral every 8 hours PRN Agitation or psychosis  OLANZapine Injectable 2.5 milliGRAM(s) IntraMuscular once PRN Agitation or psychosis   MEDICATIONS  (STANDING):  Biotene Dry Mouth Oral Rinse 15 milliLiter(s) Swish and Spit three times a day  buPROPion XL (24-Hour) 150 milliGRAM(s) Oral daily  divalproex ER 1000 milliGRAM(s) Oral at bedtime  donepezil 10 milliGRAM(s) Oral at bedtime  melatonin. 3 milliGRAM(s) Oral at bedtime  QUEtiapine 100 milliGRAM(s) Oral at bedtime  QUEtiapine 50 milliGRAM(s) Oral daily    MEDICATIONS  (PRN):  acetaminophen     Tablet .. 650 milliGRAM(s) Oral every 6 hours PRN Temp greater or equal to 38C (100.4F), Mild Pain (1 - 3), Moderate Pain (4 - 6), Severe Pain (7 - 10)  aluminum hydroxide/magnesium hydroxide/simethicone Suspension 30 milliLiter(s) Oral every 6 hours PRN Dyspepsia  magnesium hydroxide Suspension 30 milliLiter(s) Oral daily PRN Constipation  OLANZapine 2.5 milliGRAM(s) Oral every 8 hours PRN Agitation or psychosis  OLANZapine Injectable 2.5 milliGRAM(s) IntraMuscular once PRN Agitation or psychosis

## 2023-10-13 NOTE — BH INPATIENT PSYCHIATRY PROGRESS NOTE - NSBHFUPINTERVALHXFT_PSY_A_CORE
Patient was seen for f/u for Bipolar disorder, cognitive impairment. Chart reviewed and case discussed in team meeting. Patient was discharge focused, reported she doesn't understand why she is still in the hospital even though we have discussed the need for home services daily. Patient expressed concern that other people are leaving and she remains. Patient was informed Daughter, Rozina, is coming today to  paperwork for home care services which could help to finalize discharge plan next week. Patient presented as irritable and guarded. Patient reported impaired sleep, expressed concern about someone walking in the hallway with heels at night, patient seems to be confusing morning and nighttime. Patient denied any SIIP.  Patient continues to have difficulty with memory, functioning and communication. Encouraged patient to monitor fluid intake, remains on strict intake order.

## 2023-10-14 RX ADMIN — DONEPEZIL HYDROCHLORIDE 10 MILLIGRAM(S): 10 TABLET, FILM COATED ORAL at 21:13

## 2023-10-14 RX ADMIN — DIVALPROEX SODIUM 1000 MILLIGRAM(S): 500 TABLET, DELAYED RELEASE ORAL at 21:12

## 2023-10-14 RX ADMIN — BUPROPION HYDROCHLORIDE 150 MILLIGRAM(S): 150 TABLET, EXTENDED RELEASE ORAL at 08:48

## 2023-10-14 RX ADMIN — QUETIAPINE FUMARATE 50 MILLIGRAM(S): 200 TABLET, FILM COATED ORAL at 08:48

## 2023-10-14 RX ADMIN — QUETIAPINE FUMARATE 100 MILLIGRAM(S): 200 TABLET, FILM COATED ORAL at 21:13

## 2023-10-14 RX ADMIN — Medication 3 MILLIGRAM(S): at 21:13

## 2023-10-15 RX ADMIN — DIVALPROEX SODIUM 1000 MILLIGRAM(S): 500 TABLET, DELAYED RELEASE ORAL at 21:16

## 2023-10-15 RX ADMIN — QUETIAPINE FUMARATE 50 MILLIGRAM(S): 200 TABLET, FILM COATED ORAL at 10:22

## 2023-10-15 RX ADMIN — DONEPEZIL HYDROCHLORIDE 10 MILLIGRAM(S): 10 TABLET, FILM COATED ORAL at 21:16

## 2023-10-15 RX ADMIN — BUPROPION HYDROCHLORIDE 150 MILLIGRAM(S): 150 TABLET, EXTENDED RELEASE ORAL at 10:22

## 2023-10-15 RX ADMIN — QUETIAPINE FUMARATE 100 MILLIGRAM(S): 200 TABLET, FILM COATED ORAL at 21:16

## 2023-10-15 RX ADMIN — Medication 3 MILLIGRAM(S): at 21:17

## 2023-10-16 PROCEDURE — 99232 SBSQ HOSP IP/OBS MODERATE 35: CPT

## 2023-10-16 RX ADMIN — DIVALPROEX SODIUM 1000 MILLIGRAM(S): 500 TABLET, DELAYED RELEASE ORAL at 21:01

## 2023-10-16 RX ADMIN — QUETIAPINE FUMARATE 100 MILLIGRAM(S): 200 TABLET, FILM COATED ORAL at 21:01

## 2023-10-16 RX ADMIN — DONEPEZIL HYDROCHLORIDE 10 MILLIGRAM(S): 10 TABLET, FILM COATED ORAL at 21:01

## 2023-10-16 RX ADMIN — Medication 3 MILLIGRAM(S): at 21:01

## 2023-10-16 RX ADMIN — BUPROPION HYDROCHLORIDE 150 MILLIGRAM(S): 150 TABLET, EXTENDED RELEASE ORAL at 08:36

## 2023-10-16 RX ADMIN — QUETIAPINE FUMARATE 50 MILLIGRAM(S): 200 TABLET, FILM COATED ORAL at 08:37

## 2023-10-16 NOTE — BH INPATIENT PSYCHIATRY PROGRESS NOTE - NSBHASSESSSUMMFT_PSY_ALL_CORE
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 AOP[facility], w/ 5 prior psychiatric hospitalizations most recently discharged from [facility] 9/7/23 for montserrat (9.13 admission, described as irritability, sleep disturbances, racing thoughts, restlessness), as well as psychotic symptoms (reported to be non command auditory hallucinations, delusional ideas of reference). , no known suicide attempt or violence hx, no known legal hx, no known substance abuse history who was brought in by EMS for concerns for suicidal ideation and bipolar decompensation.    Differential Diagnosis: Bipolar Disorder, Bipolar disorder with psychotic features, Schizoaffective disorder    On assessment, patient was more organized, brighter, more cooperative, more verbal, continues to have difficulty with word finding, denied SIIP.     Plan:   -Admit to [facility], 9.39 ahs been converted to a 2PC  -Routine observation q15 checks, No CO needed in a locked, supervised setting, taking meds.   -Psychiatry:   c/w Depakote ER 1000mg at bedtime for Bipolar d/o, c/w Seroquel to 50 mg daily and decreased to 100mg at bedtime for Mood/psychosis, c/w Buproprion XL 150mg daily for depressive episode and c/w Donepezil 10mg at bedtime for cognitive impairment.   diphenhydrAMINE 50 milliGRAM(s) Oral every 6 hours PRN agitation  diphenhydrAMINE Injectable 50 milliGRAM(s) IntraMuscular once PRN agitation  haloperidol     Tablet 2 milliGRAM(s) Oral every 6 hours PRN agitation  haloperidol    Injectable 2 milliGRAM(s) IntraMuscular Once PRN agitation  LORazepam     Tablet 1 milliGRAM(s) Oral every 6 hours PRN Agitation  LORazepam   Injectable 1 milliGRAM(s) IntraMuscular Once PRN Agitation  -Medical: Dry mouth; Biotene ordered   -Group and milieu therapy   -Dispo as per social work

## 2023-10-16 NOTE — BH INPATIENT PSYCHIATRY PROGRESS NOTE - NSBHFUPINTERVALHXFT_PSY_A_CORE
Patient was seen for f/u for Bipolar disorder, cognitive impairment. Chart reviewed and case discussed in team meeting. Patient reported she started her day with a bad mood but after showering and taking a nap feels better. Patient presented with brighter mood, more organized, understands she continues to have memory issues, however feels her "memory comes and goes". Patient reported impaired sleep, some confusion at night which affects her mood in the morning. Patient reported she attended some groups today which she feels are helpful. Patient denied any SIIP.  Encouraged patient to monitor fluid intake, nursing staff to monitor fluids.

## 2023-10-16 NOTE — BH INPATIENT PSYCHIATRY PROGRESS NOTE - NSBHMETABOLIC_PSY_ALL_CORE_FT
BMI: BMI (kg/m2): 29.3 (10-11-23 @ 13:25)  HbA1c: A1C with Estimated Average Glucose Result: 5.5 % (08-25-23 @ 13:32)    Glucose: POCT Blood Glucose.: 105 mg/dL (09-13-23 @ 23:21)    BP: --  Lipid Panel: Date/Time: 05-26-23 @ 13:30  Cholesterol, Serum: 159  Direct LDL: --  HDL Cholesterol, Serum: 33  Total Cholesterol/HDL Ration Measurement: --  Triglycerides, Serum: 197

## 2023-10-16 NOTE — BH INPATIENT PSYCHIATRY PROGRESS NOTE - ATTENDING COMMENTS
Care was discussed and reviewed in interdisciplinary treatment team.  I, Veronika Gonzalez MD, have reviewed and verified the documentation.  I independently performed the documented medical decision making.     Care was discussed and reviewed in interdisciplinary treatment team.  I, Veronika Gonzalez MD, have reviewed and verified the documentation.  I independently performed the documented medical decision making.

## 2023-10-17 PROCEDURE — 99232 SBSQ HOSP IP/OBS MODERATE 35: CPT

## 2023-10-17 RX ADMIN — BUPROPION HYDROCHLORIDE 150 MILLIGRAM(S): 150 TABLET, EXTENDED RELEASE ORAL at 10:22

## 2023-10-17 RX ADMIN — QUETIAPINE FUMARATE 50 MILLIGRAM(S): 200 TABLET, FILM COATED ORAL at 10:24

## 2023-10-17 RX ADMIN — DIVALPROEX SODIUM 1000 MILLIGRAM(S): 500 TABLET, DELAYED RELEASE ORAL at 21:18

## 2023-10-17 RX ADMIN — DONEPEZIL HYDROCHLORIDE 10 MILLIGRAM(S): 10 TABLET, FILM COATED ORAL at 21:19

## 2023-10-17 RX ADMIN — Medication 3 MILLIGRAM(S): at 21:19

## 2023-10-17 RX ADMIN — QUETIAPINE FUMARATE 100 MILLIGRAM(S): 200 TABLET, FILM COATED ORAL at 21:19

## 2023-10-17 NOTE — BH TREATMENT PLAN - NSTXDCOPNOINTERMD_PSY_ALL_CORE
Med management; group/milieu therapy.

## 2023-10-17 NOTE — BH TREATMENT PLAN - NSDCCRITERIA_PSY_ALL_CORE
Improved mood, clear and linear thought process, CGI </=3

## 2023-10-17 NOTE — BH INPATIENT PSYCHIATRY PROGRESS NOTE - NSBHFUPINTERVALHXFT_PSY_A_CORE
Patient was seen for f/u for Bipolar disorder, cognitive impairment. Chart reviewed and case discussed in team meeting. Patient reported she started her day with a bad mood but after showering and taking a nap feels better. Patient presented with brighter mood, more organized, understands she continues to have memory issues, however feels her "memory comes and goes". Patient reported impaired sleep, some confusion at night which affects her mood in the morning. Patient reported she attended some groups today which she feels are helpful. Patient denied any SIIP.  Encouraged patient to monitor fluid intake, nursing staff to monitor fluids.  Patient was seen for f/u for Bipolar disorder, cognitive impairment. Chart reviewed and case discussed in team meeting. Patient much improved, dressed in street clothes, with brighter mood, more organized, understands she continues to have memory issues, was able to remember seeing attending and writer this morning. Patient reported "best sleep of my life" last night, feels room change is much better "worlds apart". Patient visible on the unit, attending groups. Patient denied any SIIP.  Encouraged patient to monitor fluid intake, nursing staff to monitor fluids.

## 2023-10-17 NOTE — BH TREATMENT PLAN - NSTXPLANTHERAPYSESSIONSFT_PSY_ALL_CORE
10-04-23  Type of therapy: Creative arts therapy, Leisure development, Mindfulness, Pet therapy, Spirituality  Type of session: Individual  Level of patient participation: Attentive, Engaged  Duration of participation: 30 minutes  Therapy conducted by: Psych rehab  Therapy Summary: Writer met with patient to assess progress of psychiatric rehabilitation goal over the past week. Patient reported feeling better and expressed eagerness to return home. Throughout the session, patient was tearful as she discussed having a tough life, having few people that she can trust, and her concern towards her memory loss. Patient stated that her admission didn't feel like reality and that she now understands she is in the hospital. Writer validated patient's concerns and engaged her in safety planning in preparation for discharge. Patient was receptive and thanked writer for meeting with her.     Over the past seven days, patient was visible on the unit, attended 53% of groups, and exhibited limited interactions with peers. Patient is engaged in treatment with staff and medication compliant. Patient has good behavioral control, requires assistance with ADLs, and denied SI in session with writer.  
  09-20-23  Type of therapy: The pt did not attend any of the Psych. Rehab. groups in the past seven days.  Type of session: Individual  Level of patient participation: Engaged  Duration of participation: Less than 15 minutes  Therapy conducted by: Psych rehab  Therapy Summary: The writer met with the patient to assess progress of their psychiatric rehabilitation goal over the past week. The patient made no improvement towards her goal to identify and utilize 2 coping skills for her suicide/ SIB goal. The patient did not report utilizing coping skills. The patient is compliant with her medications, does not engage with her peers, and has fair ADL’s and behavioral control. The patient attended 0% of the Psychiatric Rehabilitation groups in the past seven days. The patient reported she doesn’t want to take her medications and wants to just get a CT scan and then be discharged. Over the next seven days, Psychiatric Rehabilitation staff will continue to provide encouragement, support, psychotherapy, and psychoeducation to assist the patient in the progression of her treatment goal and engagement with activities. The writer was unable to assess for SI/HI/AH/VH due to the patient wanting to end the session.  
  10-11-23  Type of therapy: Creative arts therapy, Peer advocate  Type of session: Individual  Level of patient participation: Not engaged  --  Therapy conducted by: Psych rehab  Therapy Summary: The writer attempted to meet with the patient to assess progress of their psychiatric rehabilitation goal over the past week. The writer was unable to assess for the patient’s improvement towards her psychiatric rehabilitation goal to identify and utilize 2 coping skills for her suicide/ SIB goal. The patient does not engage with her peers, takes her medications, and has fair ADL’s and behavioral control. Per the treatment team, the patient isolates to her room. The patient attended 14% of the Psychiatric Rehabilitation groups in the past seven days which were on the topics of peer advocate and art therapy. Over the next seven days, Psychiatric Rehabilitation staff will continue to provide encouragement, support, therapy, and psychoeducation to assist the patient in the progression of her treatment goal and engagement with activities. The writer was unable to assess for SI/HI/AH/VH.  
  10-04-23  Type of therapy: Creative arts therapy, Leisure development, Mindfulness, Pet therapy, Spirituality  Type of session: Individual  Level of patient participation: Attentive, Engaged  Duration of participation: 30 minutes  Therapy conducted by: Psych rehab  Therapy Summary: Writer met with patient to assess progress of psychiatric rehabilitation goal over the past week. Patient reported feeling better and expressed eagerness to return home. Throughout the session, patient was tearful as she discussed having a tough life, having few people that she can trust, and her concern towards her memory loss. Patient stated that her admission didn't feel like reality and that she now understands she is in the hospital. Writer validated patient's concerns and engaged her in safety planning in preparation for discharge. Patient was receptive and thanked writer for meeting with her.     Over the past seven days, patient was visible on the unit, attended 53% of groups, and exhibited limited interactions with peers. Patient is engaged in treatment with staff and medication compliant. Patient has good behavioral control, requires assistance with ADLs, and denied SI in session with writer.

## 2023-10-17 NOTE — BH TREATMENT PLAN - NSTXDCOPNOINTERSW_PSY_ALL_CORE
SW will meet with pt to provide psychoed and support towards pt's goal. SW will explore pt's lapse in care and create consistent outpt goals. SW meet with multidisciplinary team daily for updates on pt's goal progress.
SW will meet with pt to provide psychoed and support towards pt's goal. SW will explore pt's lapse in care and create consistent outpt goals. SW meet with multidisciplinary team daily for updates on pt's goal progress.     This SW float participated in interdisciplinary treatment team meeting, contacted homecare agency, [facility], and provided patient with support.

## 2023-10-17 NOTE — BH INPATIENT PSYCHIATRY PROGRESS NOTE - NSBHCHARTREVIEWVS_PSY_A_CORE FT
Vital Signs Last 24 Hrs  T(C): 36.5 (10-17-23 @ 07:28), Max: 36.5 (10-17-23 @ 07:28)  T(F): 97.7 (10-17-23 @ 07:28), Max: 97.7 (10-17-23 @ 07:28)  HR: --  BP: --  BP(mean): --  RR: 18 (10-17-23 @ 07:28) (18 - 18)  SpO2: --    Orthostatic VS  10-17-23 @ 07:28  Lying BP: --/-- HR: --  Sitting BP: 139/78 HR: 103  Standing BP: 125/74 HR: 112  Site: --  Mode: --

## 2023-10-17 NOTE — BH INPATIENT PSYCHIATRY PROGRESS NOTE - NSBHASSESSSUMMFT_PSY_ALL_CORE
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 , no known suicide attempt or violence hx, no known legal hx, no known substance abuse history who was brought in by EMS for concerns for suicidal ideation and bipolar decompensation.    Differential Diagnosis: Bipolar Disorder, Bipolar disorder with psychotic features, Schizoaffective disorder    On assessment, patient was more organized, brighter, more cooperative, more verbal, continues to have difficulty with word finding, denied SIIP.     Plan:   -Admit to [facility], 9.39 ahs been converted to a [facility]  -Routine observation q15 checks, No CO needed in a locked, supervised setting, taking meds.   -Psychiatry:   c/w Depakote ER 1000mg at bedtime for Bipolar d/o, c/w Seroquel to 50 mg daily and decreased to 100mg at bedtime for Mood/psychosis, c/w Buproprion XL 150mg daily for depressive episode and c/w Donepezil 10mg at bedtime for cognitive impairment.   diphenhydrAMINE 50 milliGRAM(s) Oral every 6 hours PRN agitation  diphenhydrAMINE Injectable 50 milliGRAM(s) IntraMuscular once PRN agitation  haloperidol     Tablet 2 milliGRAM(s) Oral every 6 hours PRN agitation  haloperidol    Injectable 2 milliGRAM(s) IntraMuscular Once PRN agitation  LORazepam     Tablet 1 milliGRAM(s) Oral every 6 hours PRN Agitation  LORazepam   Injectable 1 milliGRAM(s) IntraMuscular Once PRN Agitation  -Medical: Dry mouth; Biotene ordered   -Group and milieu therapy   -Dispo as per social work

## 2023-10-17 NOTE — BH TREATMENT PLAN - NSTXPATIENTPARTICIPATE_PSY_ALL_CORE
Patient participated in identification of needs/problems/goals for treatment/Patient participated in defining interventions
No, patient unwilling to participate
Patient participated in identification of needs/problems/goals for treatment/Patient participated in defining interventions
Patient participated in identification of needs/problems/goals for treatment/Patient participated in defining interventions

## 2023-10-17 NOTE — BH TREATMENT PLAN - NSPTSTATEDGOAL_PSY_ALL_CORE
"I want to go home"
Patient seeks psychiatric stabilization to return to the community with appropriate aftercare and supports.
"I want to go home"

## 2023-10-17 NOTE — BH TREATMENT PLAN - NSTXSUICIDDATETRGT_PSY_ALL_CORE
Biopsy Photograph Reviewed: Yes Size Of Lesion In Cm: 0.8 X Size Of Lesion In Cm (Optional): 0 Size Of Margin In Cm: 0.2 Anesthesia Volume In Cc: 1 Was An Eye Clamp Used?: No Eye Clamp Note Details: An eye clamp was used during the procedure. Excision Method: Elliptical Saucerization Depth: dermis and superficial adipose tissue Repair Type: Complex Suturegard Retention Suture: 2-0 Nylon Retention Suture Bite Size: 3 mm Length To Time In Minutes Device Was In Place: 10 Intermediate / Complex Repair - Final Wound Length In Cm: 3 Width Of Defect Perpendicular To Closure In Cm (Required): 2 Undermining Type: Entire Wound Debridement Text: The wound edges were debrided prior to proceeding with the closure to facilitate wound healing. Helical Rim Text: The closure involved the helical rim. Vermilion Border Text: The closure involved the vermilion border. Nostril Rim Text: The closure involved the nostril rim. Retention Suture Text: Retention sutures were placed to support the closure and prevent dehiscence. Suture Removal: 14 days Lab: -98 Graft Donor Site Bandage (Optional-Leave Blank If You Don't Want In Note): Steri-strips and a pressure bandage were applied to the donor site. Epidermal Closure Graft Donor Site (Optional): simple interrupted Billing Type: Third-Party Bill Excision Depth: adipose tissue Scalpel Size: 15 blade Anesthesia Type: 1% lidocaine with epinephrine Additional Anesthesia Volume In Cc: 6 Hemostasis: Electrocautery Estimated Blood Loss (Cc): minimal Detail Level: Detailed Deep Sutures: 5-0 Vicryl 24-Oct-2023 Epidermal Sutures: 4-0 Ethilon Wound Care: Petrolatum Dressing: dry sterile dressing Suturegard Intro: Intraoperative tissue expansion was performed, utilizing the SUTUREGARD device, in order to reduce wound tension. Suturegard Body: The suture ends were repeatedly re-tightened and re-clamped to achieve the desired tissue expansion. Hemigard Intro: Due to skin fragility and wound tension, it was decided to use HEMIGARD adhesive retention suture devices to permit a linear closure. The skin was cleaned and dried for a 6cm distance away from the wound. Excessive hair, if present, was removed to allow for adhesion. Hemigard Postcare Instructions: The HEMIGARD strips are to remain completely dry for at least 5-7 days. Positioning (Leave Blank If You Do Not Want): The patient was placed in a comfortable position exposing the surgical site. Pre-Excision Curettage Text (Leave Blank If You Do Not Want): Prior to drawing the surgical margin the visible lesion was removed with electrodesiccation and curettage to clearly define the lesion size. Complex Repair Preamble Text (Leave Blank If You Do Not Want): Extensive wide undermining was performed. Intermediate Repair Preamble Text (Leave Blank If You Do Not Want): Undermining was performed with blunt dissection. Curvilinear Excision Additional Text (Leave Blank If You Do Not Want): The margin was drawn around the clinically apparent lesion.  A curvilinear shape was then drawn on the skin incorporating the lesion and margins.  Incisions were then made along these lines to the appropriate tissue plane and the lesion was extirpated. Fusiform Excision Additional Text (Leave Blank If You Do Not Want): The margin was drawn around the clinically apparent lesion.  A fusiform shape was then drawn on the skin incorporating the lesion and margins.  Incisions were then made along these lines to the appropriate tissue plane and the lesion was extirpated. Elliptical Excision Additional Text (Leave Blank If You Do Not Want): The margin was drawn around the clinically apparent lesion.  An elliptical shape was then drawn on the skin incorporating the lesion and margins.  Incisions were then made along these lines to the appropriate tissue plane and the lesion was extirpated. Saucerization Excision Additional Text (Leave Blank If You Do Not Want): The margin was drawn around the clinically apparent lesion.  Incisions were then made along these lines, in a tangential fashion, to the appropriate tissue plane and the lesion was extirpated. Slit Excision Additional Text (Leave Blank If You Do Not Want): A linear line was drawn on the skin overlying the lesion. An incision was made slowly until the lesion was visualized.  Once visualized, the lesion was removed with blunt dissection. Excisional Biopsy Additional Text (Leave Blank If You Do Not Want): The margin was drawn around the clinically apparent lesion. An elliptical shape was then drawn on the skin incorporating the lesion and margins.  Incisions were then made along these lines to the appropriate tissue plane and the lesion was extirpated. Perilesional Excision Additional Text (Leave Blank If You Do Not Want): The margin was drawn around the clinically apparent lesion. Incisions were then made along these lines to the appropriate tissue plane and the lesion was extirpated. Repair Performed By Another Provider Text (Leave Blank If You Do Not Want): After the tissue was excised the defect was repaired by another provider. No Repair - Repaired With Adjacent Surgical Defect Text (Leave Blank If You Do Not Want): After the excision the defect was repaired concurrently with another surgical defect which was in close approximation. Adjacent Tissue Transfer Text: The defect edges were debeveled with a #15 scalpel blade. Given the location of the defect and the proximity to free margins an adjacent tissue transfer was deemed most appropriate. Using a sterile surgical marker, an appropriate flap was drawn incorporating the defect and placing the expected incisions within the relaxed skin tension lines where possible. The area thus outlined was incised deep to adipose tissue with a #15 scalpel blade. The skin margins were undermined to an appropriate distance in all directions utilizing iris scissors and carried over to close the primary defect. Advancement Flap (Single) Text: The defect edges were debeveled with a #15 scalpel blade.  Given the location of the defect and the proximity to free margins a single advancement flap was deemed most appropriate.  Using a sterile surgical marker, an appropriate advancement flap was drawn incorporating the defect and placing the expected incisions within the relaxed skin tension lines where possible.    The area thus outlined was incised deep to adipose tissue with a #15 scalpel blade.  The skin margins were undermined to an appropriate distance in all directions utilizing iris scissors. Advancement Flap (Double) Text: The defect edges were debeveled with a #15 scalpel blade.  Given the location of the defect and the proximity to free margins a double advancement flap was deemed most appropriate.  Using a sterile surgical marker, the appropriate advancement flaps were drawn incorporating the defect and placing the expected incisions within the relaxed skin tension lines where possible.    The area thus outlined was incised deep to adipose tissue with a #15 scalpel blade.  The skin margins were undermined to an appropriate distance in all directions utilizing iris scissors. Burow's Advancement Flap Text: The defect edges were debeveled with a #15 scalpel blade.  Given the location of the defect and the proximity to free margins a Burow's advancement flap was deemed most appropriate.  Using a sterile surgical marker, the appropriate advancement flap was drawn incorporating the defect and placing the expected incisions within the relaxed skin tension lines where possible.    The area thus outlined was incised deep to adipose tissue with a #15 scalpel blade.  The skin margins were undermined to an appropriate distance in all directions utilizing iris scissors. Chonodrocutaneous Helical Advancement Flap Text: The defect edges were debeveled with a #15 scalpel blade. Given the location of the defect and the proximity to free margins a chondrocutaneous helical advancement flap was deemed most appropriate. Using a sterile surgical marker, the appropriate advancement flap was drawn incorporating the defect and placing the expected incisions within the relaxed skin tension lines where possible. The area thus outlined was incised deep to adipose tissue with a #15 scalpel blade. The skin margins were undermined to an appropriate distance in all directions utilizing iris scissors. Following this, the designed flap was advanced and carried over into the primary defect and sutured into place. Crescentic Advancement Flap Text: The defect edges were debeveled with a #15 scalpel blade.  Given the location of the defect and the proximity to free margins a crescentic advancement flap was deemed most appropriate.  Using a sterile surgical marker, the appropriate advancement flap was drawn incorporating the defect and placing the expected incisions within the relaxed skin tension lines where possible.    The area thus outlined was incised deep to adipose tissue with a #15 scalpel blade.  The skin margins were undermined to an appropriate distance in all directions utilizing iris scissors. A-T Advancement Flap Text: The defect edges were debeveled with a #15 scalpel blade.  Given the location of the defect, shape of the defect and the proximity to free margins an A-T advancement flap was deemed most appropriate.  Using a sterile surgical marker, an appropriate advancement flap was drawn incorporating the defect and placing the expected incisions within the relaxed skin tension lines where possible.    The area thus outlined was incised deep to adipose tissue with a #15 scalpel blade.  The skin margins were undermined to an appropriate distance in all directions utilizing iris scissors. O-T Advancement Flap Text: The defect edges were debeveled with a #15 scalpel blade.  Given the location of the defect, shape of the defect and the proximity to free margins an O-T advancement flap was deemed most appropriate.  Using a sterile surgical marker, an appropriate advancement flap was drawn incorporating the defect and placing the expected incisions within the relaxed skin tension lines where possible.    The area thus outlined was incised deep to adipose tissue with a #15 scalpel blade.  The skin margins were undermined to an appropriate distance in all directions utilizing iris scissors. O-L Flap Text: The defect edges were debeveled with a #15 scalpel blade.  Given the location of the defect, shape of the defect and the proximity to free margins an O-L flap was deemed most appropriate.  Using a sterile surgical marker, an appropriate advancement flap was drawn incorporating the defect and placing the expected incisions within the relaxed skin tension lines where possible.    The area thus outlined was incised deep to adipose tissue with a #15 scalpel blade.  The skin margins were undermined to an appropriate distance in all directions utilizing iris scissors. O-Z Flap Text: The defect edges were debeveled with a #15 scalpel blade. Given the location of the defect, shape of the defect and the proximity to free margins an O-Z flap was deemed most appropriate. Using a sterile surgical marker, an appropriate transposition flap was drawn incorporating the defect and placing the expected incisions within the relaxed skin tension lines where possible. The area thus outlined was incised deep to adipose tissue with a #15 scalpel blade. The skin margins were undermined to an appropriate distance in all directions utilizing iris scissors. Following this, the designed flap was carried over into the primary defect and sutured into place. Double O-Z Flap Text: The defect edges were debeveled with a #15 scalpel blade. Given the location of the defect, shape of the defect and the proximity to free margins a Double O-Z flap was deemed most appropriate. Using a sterile surgical marker, an appropriate transposition flap was drawn incorporating the defect and placing the expected incisions within the relaxed skin tension lines where possible. The area thus outlined was incised deep to adipose tissue with a #15 scalpel blade. The skin margins were undermined to an appropriate distance in all directions utilizing iris scissors. Following this, the designed flap was carried over into the primary defect and sutured into place. V-Y Flap Text: The defect edges were debeveled with a #15 scalpel blade.  Given the location of the defect, shape of the defect and the proximity to free margins a V-Y flap was deemed most appropriate.  Using a sterile surgical marker, an appropriate advancement flap was drawn incorporating the defect and placing the expected incisions within the relaxed skin tension lines where possible.    The area thus outlined was incised deep to adipose tissue with a #15 scalpel blade.  The skin margins were undermined to an appropriate distance in all directions utilizing iris scissors. Advancement-Rotation Flap Text: The defect edges were debeveled with a #15 scalpel blade.  Given the location of the defect, shape of the defect and the proximity to free margins an advancement-rotation flap was deemed most appropriate.  Using a sterile surgical marker, an appropriate flap was drawn incorporating the defect and placing the expected incisions within the relaxed skin tension lines where possible. The area thus outlined was incised deep to adipose tissue with a #15 scalpel blade.  The skin margins were undermined to an appropriate distance in all directions utilizing iris scissors. Mercedes Flap Text: The defect edges were debeveled with a #15 scalpel blade.  Given the location of the defect, shape of the defect and the proximity to free margins a Mercedes flap was deemed most appropriate.  Using a sterile surgical marker, an appropriate advancement flap was drawn incorporating the defect and placing the expected incisions within the relaxed skin tension lines where possible. The area thus outlined was incised deep to adipose tissue with a #15 scalpel blade.  The skin margins were undermined to an appropriate distance in all directions utilizing iris scissors. Modified Advancement Flap Text: The defect edges were debeveled with a #15 scalpel blade.  Given the location of the defect, shape of the defect and the proximity to free margins a modified advancement flap was deemed most appropriate.  Using a sterile surgical marker, an appropriate advancement flap was drawn incorporating the defect and placing the expected incisions within the relaxed skin tension lines where possible.    The area thus outlined was incised deep to adipose tissue with a #15 scalpel blade.  The skin margins were undermined to an appropriate distance in all directions utilizing iris scissors. Mucosal Advancement Flap Text: Given the location of the defect, shape of the defect and the proximity to free margins a mucosal advancement flap was deemed most appropriate. Incisions were made with a 15 blade scalpel in the appropriate fashion along the cutaneous vermilion border and the mucosal lip. The remaining actinically damaged mucosal tissue was excised.  The mucosal advancement flap was then elevated to the gingival sulcus with care taken to preserve the neurovascular structures and advanced into the primary defect. Care was taken to ensure that precise realignment of the vermilion border was achieved. Peng Advancement Flap Text: The defect edges were debeveled with a #15 scalpel blade. Given the location of the defect, shape of the defect and the proximity to free margins a Peng advancement flap was deemed most appropriate. Using a sterile surgical marker, an appropriate advancement flap was drawn incorporating the defect and placing the expected incisions within the relaxed skin tension lines where possible. The area thus outlined was incised deep to adipose tissue with a #15 scalpel blade. The skin margins were undermined to an appropriate distance in all directions utilizing iris scissors. Following this, the designed flap was advanced and carried over into the primary defect and sutured into place. Hatchet Flap Text: The defect edges were debeveled with a #15 scalpel blade.  Given the location of the defect, shape of the defect and the proximity to free margins a hatchet flap was deemed most appropriate.  Using a sterile surgical marker, an appropriate hatchet flap was drawn incorporating the defect and placing the expected incisions within the relaxed skin tension lines where possible.    The area thus outlined was incised deep to adipose tissue with a #15 scalpel blade.  The skin margins were undermined to an appropriate distance in all directions utilizing iris scissors. Rotation Flap Text: The defect edges were debeveled with a #15 scalpel blade.  Given the location of the defect, shape of the defect and the proximity to free margins a rotation flap was deemed most appropriate.  Using a sterile surgical marker, an appropriate rotation flap was drawn incorporating the defect and placing the expected incisions within the relaxed skin tension lines where possible.    The area thus outlined was incised deep to adipose tissue with a #15 scalpel blade.  The skin margins were undermined to an appropriate distance in all directions utilizing iris scissors. Bilateral Rotation Flap Text: The defect edges were debeveled with a #15 scalpel blade. Given the location of the defect, shape of the defect and the proximity to free margins a bilateral rotation flap was deemed most appropriate. Using a sterile surgical marker, an appropriate rotation flap was drawn incorporating the defect and placing the expected incisions within the relaxed skin tension lines where possible. The area thus outlined was incised deep to adipose tissue with a #15 scalpel blade. The skin margins were undermined to an appropriate distance in all directions utilizing iris scissors. Following this, the designed flap was carried over into the primary defect and sutured into place. Spiral Flap Text: The defect edges were debeveled with a #15 scalpel blade.  Given the location of the defect, shape of the defect and the proximity to free margins a spiral flap was deemed most appropriate.  Using a sterile surgical marker, an appropriate rotation flap was drawn incorporating the defect and placing the expected incisions within the relaxed skin tension lines where possible. The area thus outlined was incised deep to adipose tissue with a #15 scalpel blade.  The skin margins were undermined to an appropriate distance in all directions utilizing iris scissors. Staged Advancement Flap Text: The defect edges were debeveled with a #15 scalpel blade. Given the location of the defect, shape of the defect and the proximity to free margins a staged advancement flap was deemed most appropriate. Using a sterile surgical marker, an appropriate advancement flap was drawn incorporating the defect and placing the expected incisions within the relaxed skin tension lines where possible. The area thus outlined was incised deep to adipose tissue with a #15 scalpel blade. The skin margins were undermined to an appropriate distance in all directions utilizing iris scissors. Following this, the designed flap was carried over into the primary defect and sutured into place. Star Wedge Flap Text: The defect edges were debeveled with a #15 scalpel blade.  Given the location of the defect, shape of the defect and the proximity to free margins a star wedge flap was deemed most appropriate.  Using a sterile surgical marker, an appropriate rotation flap was drawn incorporating the defect and placing the expected incisions within the relaxed skin tension lines where possible. The area thus outlined was incised deep to adipose tissue with a #15 scalpel blade.  The skin margins were undermined to an appropriate distance in all directions utilizing iris scissors. Transposition Flap Text: The defect edges were debeveled with a #15 scalpel blade.  Given the location of the defect and the proximity to free margins a transposition flap was deemed most appropriate.  Using a sterile surgical marker, an appropriate transposition flap was drawn incorporating the defect.    The area thus outlined was incised deep to adipose tissue with a #15 scalpel blade.  The skin margins were undermined to an appropriate distance in all directions utilizing iris scissors. Muscle Hinge Flap Text: The defect edges were debeveled with a #15 scalpel blade.  Given the size, depth and location of the defect and the proximity to free margins a muscle hinge flap was deemed most appropriate.  Using a sterile surgical marker, an appropriate hinge flap was drawn incorporating the defect. The area thus outlined was incised with a #15 scalpel blade.  The skin margins were undermined to an appropriate distance in all directions utilizing iris scissors. Mustarde Flap Text: The defect edges were debeveled with a #15 scalpel blade.  Given the size, depth and location of the defect and the proximity to free margins a Mustarde flap was deemed most appropriate. Using a sterile surgical marker, an appropriate flap was drawn incorporating the defect. The area thus outlined was incised with a #15 scalpel blade. The skin margins were undermined to an appropriate distance in all directions utilizing iris scissors. Following this, the designed flap was carried into the primary defect and sutured into place. Nasal Turnover Hinge Flap Text: The defect edges were debeveled with a #15 scalpel blade.  Given the size, depth, location of the defect and the defect being full thickness a nasal turnover hinge flap was deemed most appropriate. Using a sterile surgical marker, an appropriate hinge flap was drawn incorporating the defect. The area thus outlined was incised with a #15 scalpel blade. The flap was designed to recreate the nasal mucosal lining and the alar rim. The skin margins were undermined to an appropriate distance in all directions utilizing iris scissors. Following this, the designed flap was carried over into the primary defect and sutured into place Nasalis-Muscle-Based Myocutaneous Island Pedicle Flap Text: Using a #15 blade, an incision was made around the donor flap to the level of the nasalis muscle. Wide lateral undermining was then performed in both the subcutaneous plane above the nasalis muscle, and in a submuscular plane just above periosteum. This allowed the formation of a free nasalis muscle axial pedicle (based on the angular artery) which was still attached to the actual cutaneous flap, increasing its mobility and vascular viability. Hemostasis was obtained with pinpoint electrocoagulation. The flap was mobilized into position and the pivotal anchor points positioned and stabilized with buried interrupted sutures. Subcutaneous and dermal tissues were closed in a multilayered fashion with sutures. Tissue redundancies were excised, and the epidermal edges were apposed without significant tension and sutured with sutures. Orbicularis Oris Muscle Flap Text: The defect edges were debeveled with a #15 scalpel blade.  Given that the defect affected the competency of the oral sphincter an orbicularis oris muscle flap was deemed most appropriate to restore this competency and normal muscle function.  Using a sterile surgical marker, an appropriate flap was drawn incorporating the defect. The area thus outlined was incised with a #15 scalpel blade. Following this, the designed flap was carried over into the primary defect and sutured into place. Melolabial Transposition Flap Text: The defect edges were debeveled with a #15 scalpel blade.  Given the location of the defect and the proximity to free margins a melolabial flap was deemed most appropriate.  Using a sterile surgical marker, an appropriate melolabial transposition flap was drawn incorporating the defect.    The area thus outlined was incised deep to adipose tissue with a #15 scalpel blade.  The skin margins were undermined to an appropriate distance in all directions utilizing iris scissors. Rhombic Flap Text: The defect edges were debeveled with a #15 scalpel blade.  Given the location of the defect and the proximity to free margins a rhombic flap was deemed most appropriate.  Using a sterile surgical marker, an appropriate rhombic flap was drawn incorporating the defect.    The area thus outlined was incised deep to adipose tissue with a #15 scalpel blade.  The skin margins were undermined to an appropriate distance in all directions utilizing iris scissors. Rhomboid Transposition Flap Text: The defect edges were debeveled with a #15 scalpel blade. Given the location of the defect and the proximity to free margins a rhomboid transposition flap was deemed most appropriate. Using a sterile surgical marker, an appropriate rhomboid flap was drawn incorporating the defect. The area thus outlined was incised deep to adipose tissue with a #15 scalpel blade. The skin margins were undermined to an appropriate distance in all directions utilizing iris scissors. Following this, the designed flap was carried over into the primary defect and sutured into place. Bi-Rhombic Flap Text: The defect edges were debeveled with a #15 scalpel blade.  Given the location of the defect and the proximity to free margins a bi-rhombic flap was deemed most appropriate.  Using a sterile surgical marker, an appropriate rhombic flap was drawn incorporating the defect. The area thus outlined was incised deep to adipose tissue with a #15 scalpel blade.  The skin margins were undermined to an appropriate distance in all directions utilizing iris scissors. Helical Rim Advancement Flap Text: The defect edges were debeveled with a #15 blade scalpel.  Given the location of the defect and the proximity to free margins (helical rim) a double helical rim advancement flap was deemed most appropriate.  Using a sterile surgical marker, the appropriate advancement flaps were drawn incorporating the defect and placing the expected incisions between the helical rim and antihelix where possible.  The area thus outlined was incised through and through with a #15 scalpel blade.  With a skin hook and iris scissors, the flaps were gently and sharply undermined and freed up. Bilateral Helical Rim Advancement Flap Text: The defect edges were debeveled with a #15 blade scalpel.  Given the location of the defect and the proximity to free margins (helical rim) a bilateral helical rim advancement flap was deemed most appropriate.  Using a sterile surgical marker, the appropriate advancement flaps were drawn incorporating the defect and placing the expected incisions between the helical rim and antihelix where possible.  The area thus outlined was incised through and through with a #15 scalpel blade.  With a skin hook and iris scissors, the flaps were gently and sharply undermined and freed up. Ear Star Wedge Flap Text: The defect edges were debeveled with a #15 blade scalpel.  Given the location of the defect and the proximity to free margins (helical rim) an ear star wedge flap was deemed most appropriate.  Using a sterile surgical marker, the appropriate flap was drawn incorporating the defect and placing the expected incisions between the helical rim and antihelix where possible.  The area thus outlined was incised through and through with a #15 scalpel blade. Banner Transposition Flap Text: The defect edges were debeveled with a #15 scalpel blade.  Given the location of the defect and the proximity to free margins a Banner transposition flap was deemed most appropriate.  Using a sterile surgical marker, an appropriate flap drawn around the defect. The area thus outlined was incised deep to adipose tissue with a #15 scalpel blade.  The skin margins were undermined to an appropriate distance in all directions utilizing iris scissors. Bilobed Flap Text: The defect edges were debeveled with a #15 scalpel blade.  Given the location of the defect and the proximity to free margins a bilobe flap was deemed most appropriate.  Using a sterile surgical marker, an appropriate bilobe flap drawn around the defect.    The area thus outlined was incised deep to adipose tissue with a #15 scalpel blade.  The skin margins were undermined to an appropriate distance in all directions utilizing iris scissors. Bilobed Transposition Flap Text: The defect edges were debeveled with a #15 scalpel blade.  Given the location of the defect and the proximity to free margins a bilobed transposition flap was deemed most appropriate.  Using a sterile surgical marker, an appropriate bilobe flap drawn around the defect.    The area thus outlined was incised deep to adipose tissue with a #15 scalpel blade.  The skin margins were undermined to an appropriate distance in all directions utilizing iris scissors. Trilobed Flap Text: The defect edges were debeveled with a #15 scalpel blade.  Given the location of the defect and the proximity to free margins a trilobed flap was deemed most appropriate.  Using a sterile surgical marker, an appropriate trilobed flap drawn around the defect.    The area thus outlined was incised deep to adipose tissue with a #15 scalpel blade.  The skin margins were undermined to an appropriate distance in all directions utilizing iris scissors. Dorsal Nasal Flap Text: The defect edges were debeveled with a #15 scalpel blade.  Given the location of the defect and the proximity to free margins a dorsal nasal flap was deemed most appropriate.  Using a sterile surgical marker, an appropriate dorsal nasal flap was drawn around the defect.    The area thus outlined was incised deep to adipose tissue with a #15 scalpel blade.  The skin margins were undermined to an appropriate distance in all directions utilizing iris scissors. Island Pedicle Flap Text: The defect edges were debeveled with a #15 scalpel blade.  Given the location of the defect, shape of the defect and the proximity to free margins an island pedicle advancement flap was deemed most appropriate.  Using a sterile surgical marker, an appropriate advancement flap was drawn incorporating the defect, outlining the appropriate donor tissue and placing the expected incisions within the relaxed skin tension lines where possible.    The area thus outlined was incised deep to adipose tissue with a #15 scalpel blade.  The skin margins were undermined to an appropriate distance in all directions around the primary defect and laterally outward around the island pedicle utilizing iris scissors.  There was minimal undermining beneath the pedicle flap. Island Pedicle Flap With Canthal Suspension Text: The defect edges were debeveled with a #15 scalpel blade.  Given the location of the defect, shape of the defect and the proximity to free margins an island pedicle advancement flap was deemed most appropriate.  Using a sterile surgical marker, an appropriate advancement flap was drawn incorporating the defect, outlining the appropriate donor tissue and placing the expected incisions within the relaxed skin tension lines where possible. The area thus outlined was incised deep to adipose tissue with a #15 scalpel blade.  The skin margins were undermined to an appropriate distance in all directions around the primary defect and laterally outward around the island pedicle utilizing iris scissors.  There was minimal undermining beneath the pedicle flap. A suspension suture was placed in the canthal tendon to prevent tension and prevent ectropion. Alar Island Pedicle Flap Text: The defect edges were debeveled with a #15 scalpel blade.  Given the location of the defect, shape of the defect and the proximity to the alar rim an island pedicle advancement flap was deemed most appropriate.  Using a sterile surgical marker, an appropriate advancement flap was drawn incorporating the defect, outlining the appropriate donor tissue and placing the expected incisions within the nasal ala running parallel to the alar rim. The area thus outlined was incised with a #15 scalpel blade.  The skin margins were undermined minimally to an appropriate distance in all directions around the primary defect and laterally outward around the island pedicle utilizing iris scissors.  There was minimal undermining beneath the pedicle flap. Double Island Pedicle Flap Text: The defect edges were debeveled with a #15 scalpel blade.  Given the location of the defect, shape of the defect and the proximity to free margins a double island pedicle advancement flap was deemed most appropriate.  Using a sterile surgical marker, an appropriate advancement flap was drawn incorporating the defect, outlining the appropriate donor tissue and placing the expected incisions within the relaxed skin tension lines where possible.    The area thus outlined was incised deep to adipose tissue with a #15 scalpel blade.  The skin margins were undermined to an appropriate distance in all directions around the primary defect and laterally outward around the island pedicle utilizing iris scissors.  There was minimal undermining beneath the pedicle flap. Island Pedicle Flap-Requiring Vessel Identification Text: The defect edges were debeveled with a #15 scalpel blade.  Given the location of the defect, shape of the defect and the proximity to free margins an island pedicle advancement flap was deemed most appropriate.  Using a sterile surgical marker, an appropriate advancement flap was drawn, based on the axial vessel mentioned above, incorporating the defect, outlining the appropriate donor tissue and placing the expected incisions within the relaxed skin tension lines where possible.    The area thus outlined was incised deep to adipose tissue with a #15 scalpel blade.  The skin margins were undermined to an appropriate distance in all directions around the primary defect and laterally outward around the island pedicle utilizing iris scissors.  There was minimal undermining beneath the pedicle flap. Keystone Flap Text: The defect edges were debeveled with a #15 scalpel blade.  Given the location of the defect, shape of the defect a keystone flap was deemed most appropriate.  Using a sterile surgical marker, an appropriate keystone flap was drawn incorporating the defect, outlining the appropriate donor tissue and placing the expected incisions within the relaxed skin tension lines where possible. The area thus outlined was incised deep to adipose tissue with a #15 scalpel blade.  The skin margins were undermined to an appropriate distance in all directions around the primary defect and laterally outward around the flap utilizing iris scissors. O-T Plasty Text: The defect edges were debeveled with a #15 scalpel blade.  Given the location of the defect, shape of the defect and the proximity to free margins an O-T plasty was deemed most appropriate.  Using a sterile surgical marker, an appropriate O-T plasty was drawn incorporating the defect and placing the expected incisions within the relaxed skin tension lines where possible.    The area thus outlined was incised deep to adipose tissue with a #15 scalpel blade.  The skin margins were undermined to an appropriate distance in all directions utilizing iris scissors. O-Z Plasty Text: The defect edges were debeveled with a #15 scalpel blade.  Given the location of the defect, shape of the defect and the proximity to free margins an O-Z plasty (double transposition flap) was deemed most appropriate.  Using a sterile surgical marker, the appropriate transposition flaps were drawn incorporating the defect and placing the expected incisions within the relaxed skin tension lines where possible.    The area thus outlined was incised deep to adipose tissue with a #15 scalpel blade.  The skin margins were undermined to an appropriate distance in all directions utilizing iris scissors.  Hemostasis was achieved with electrocautery.  The flaps were then transposed into place, one clockwise and the other counterclockwise, and anchored with interrupted buried subcutaneous sutures. Double O-Z Plasty Text: The defect edges were debeveled with a #15 scalpel blade. Given the location of the defect, shape of the defect and the proximity to free margins a Double O-Z plasty (double transposition flap) was deemed most appropriate. Using a sterile surgical marker, the appropriate transposition flaps were drawn incorporating the defect and placing the expected incisions within the relaxed skin tension lines where possible. The area thus outlined was incised deep to adipose tissue with a #15 scalpel blade. The skin margins were undermined to an appropriate distance in all directions utilizing iris scissors. Hemostasis was achieved with electrocautery. The flaps were then transposed and carried over into place, one clockwise and the other counterclockwise, and anchored with interrupted buried subcutaneous sutures. V-Y Plasty Text: The defect edges were debeveled with a #15 scalpel blade.  Given the location of the defect, shape of the defect and the proximity to free margins an V-Y advancement flap was deemed most appropriate.  Using a sterile surgical marker, an appropriate advancement flap was drawn incorporating the defect and placing the expected incisions within the relaxed skin tension lines where possible.    The area thus outlined was incised deep to adipose tissue with a #15 scalpel blade.  The skin margins were undermined to an appropriate distance in all directions utilizing iris scissors. H Plasty Text: Given the location of the defect, shape of the defect and the proximity to free margins a H-plasty was deemed most appropriate for repair.  Using a sterile surgical marker, the appropriate advancement arms of the H-plasty were drawn incorporating the defect and placing the expected incisions within the relaxed skin tension lines where possible. The area thus outlined was incised deep to adipose tissue with a #15 scalpel blade. The skin margins were undermined to an appropriate distance in all directions utilizing iris scissors.  The opposing advancement arms were then advanced into place in opposite direction and anchored with interrupted buried subcutaneous sutures. W Plasty Text: The lesion was extirpated to the level of the fat with a #15 scalpel blade.  Given the location of the defect, shape of the defect and the proximity to free margins a W-plasty was deemed most appropriate for repair.  Using a sterile surgical marker, the appropriate transposition arms of the W-plasty were drawn incorporating the defect and placing the expected incisions within the relaxed skin tension lines where possible.    The area thus outlined was incised deep to adipose tissue with a #15 scalpel blade.  The skin margins were undermined to an appropriate distance in all directions utilizing iris scissors.  The opposing transposition arms were then transposed into place in opposite direction and anchored with interrupted buried subcutaneous sutures. Z Plasty Text: The lesion was extirpated to the level of the fat with a #15 scalpel blade.  Given the location of the defect, shape of the defect and the proximity to free margins a Z-plasty was deemed most appropriate for repair.  Using a sterile surgical marker, the appropriate transposition arms of the Z-plasty were drawn incorporating the defect and placing the expected incisions within the relaxed skin tension lines where possible.    The area thus outlined was incised deep to adipose tissue with a #15 scalpel blade.  The skin margins were undermined to an appropriate distance in all directions utilizing iris scissors.  The opposing transposition arms were then transposed into place in opposite direction and anchored with interrupted buried subcutaneous sutures. Double Z Plasty Text: The lesion was extirpated to the level of the fat with a #15 scalpel blade. Given the location of the defect, shape of the defect and the proximity to free margins a double Z-plasty was deemed most appropriate for repair. Using a sterile surgical marker, the appropriate transposition arms of the double Z-plasty were drawn incorporating the defect and placing the expected incisions within the relaxed skin tension lines where possible. The area thus outlined was incised deep to adipose tissue with a #15 scalpel blade. The skin margins were undermined to an appropriate distance in all directions utilizing iris scissors. The opposing transposition arms were then transposed and carried over into place in opposite direction and anchored with interrupted buried subcutaneous sutures. Zygomaticofacial Flap Text: Given the location of the defect, shape of the defect and the proximity to free margins a zygomaticofacial flap was deemed most appropriate for repair. Using a sterile surgical marker, the appropriate flap was drawn incorporating the defect and placing the expected incisions within the relaxed skin tension lines where possible. The area thus outlined was incised deep to adipose tissue with a #15 scalpel blade with preservation of a vascular pedicle.  The skin margins were undermined to an appropriate distance in all directions utilizing iris scissors. The flap was then carried over into the defect and anchored with interrupted buried subcutaneous sutures. Cheek Interpolation Flap Text: A decision was made to reconstruct the defect utilizing an interpolation axial flap and a staged reconstruction.  A telfa template was made of the defect.  This telfa template was then used to outline the Cheek Interpolation flap.  The donor area for the pedicle flap was then injected with anesthesia.  The flap was excised through the skin and subcutaneous tissue down to the layer of the underlying musculature.  The interpolation flap was carefully excised within this deep plane to maintain its blood supply.  The edges of the donor site were undermined.   The donor site was closed in a primary fashion.  The pedicle was then rotated into position and sutured.  Once the tube was sutured into place, adequate blood supply was confirmed with blanching and refill.  The pedicle was then wrapped with xeroform gauze and dressed appropriately with a telfa and gauze bandage to ensure continued blood supply and protect the attached pedicle. Cheek-To-Nose Interpolation Flap Text: A decision was made to reconstruct the defect utilizing an interpolation axial flap and a staged reconstruction.  A telfa template was made of the defect.  This telfa template was then used to outline the Cheek-To-Nose Interpolation flap.  The donor area for the pedicle flap was then injected with anesthesia.  The flap was excised through the skin and subcutaneous tissue down to the layer of the underlying musculature.  The interpolation flap was carefully excised within this deep plane to maintain its blood supply.  The edges of the donor site were undermined.   The donor site was closed in a primary fashion.  The pedicle was then rotated into position and sutured.  Once the tube was sutured into place, adequate blood supply was confirmed with blanching and refill.  The pedicle was then wrapped with xeroform gauze and dressed appropriately with a telfa and gauze bandage to ensure continued blood supply and protect the attached pedicle. Interpolation Flap Text: A decision was made to reconstruct the defect utilizing an interpolation axial flap and a staged reconstruction.  A telfa template was made of the defect.  This telfa template was then used to outline the interpolation flap.  The donor area for the pedicle flap was then injected with anesthesia.  The flap was excised through the skin and subcutaneous tissue down to the layer of the underlying musculature.  The interpolation flap was carefully excised within this deep plane to maintain its blood supply.  The edges of the donor site were undermined.   The donor site was closed in a primary fashion.  The pedicle was then rotated into position and sutured.  Once the tube was sutured into place, adequate blood supply was confirmed with blanching and refill.  The pedicle was then wrapped with xeroform gauze and dressed appropriately with a telfa and gauze bandage to ensure continued blood supply and protect the attached pedicle. Melolabial Interpolation Flap Text: A decision was made to reconstruct the defect utilizing an interpolation axial flap and a staged reconstruction.  A telfa template was made of the defect.  This telfa template was then used to outline the melolabial interpolation flap.  The donor area for the pedicle flap was then injected with anesthesia.  The flap was excised through the skin and subcutaneous tissue down to the layer of the underlying musculature.  The pedicle flap was carefully excised within this deep plane to maintain its blood supply.  The edges of the donor site were undermined.   The donor site was closed in a primary fashion.  The pedicle was then rotated into position and sutured.  Once the tube was sutured into place, adequate blood supply was confirmed with blanching and refill.  The pedicle was then wrapped with xeroform gauze and dressed appropriately with a telfa and gauze bandage to ensure continued blood supply and protect the attached pedicle. Mastoid Interpolation Flap Text: A decision was made to reconstruct the defect utilizing an interpolation axial flap and a staged reconstruction.  A telfa template was made of the defect.  This telfa template was then used to outline the mastoid interpolation flap.  The donor area for the pedicle flap was then injected with anesthesia.  The flap was excised through the skin and subcutaneous tissue down to the layer of the underlying musculature.  The pedicle flap was carefully excised within this deep plane to maintain its blood supply.  The edges of the donor site were undermined.   The donor site was closed in a primary fashion.  The pedicle was then rotated into position and sutured.  Once the tube was sutured into place, adequate blood supply was confirmed with blanching and refill.  The pedicle was then wrapped with xeroform gauze and dressed appropriately with a telfa and gauze bandage to ensure continued blood supply and protect the attached pedicle. Posterior Auricular Interpolation Flap Text: A decision was made to reconstruct the defect utilizing an interpolation axial flap and a staged reconstruction.  A telfa template was made of the defect.  This telfa template was then used to outline the posterior auricular interpolation flap.  The donor area for the pedicle flap was then injected with anesthesia.  The flap was excised through the skin and subcutaneous tissue down to the layer of the underlying musculature.  The pedicle flap was carefully excised within this deep plane to maintain its blood supply.  The edges of the donor site were undermined.   The donor site was closed in a primary fashion.  The pedicle was then rotated into position and sutured.  Once the tube was sutured into place, adequate blood supply was confirmed with blanching and refill.  The pedicle was then wrapped with xeroform gauze and dressed appropriately with a telfa and gauze bandage to ensure continued blood supply and protect the attached pedicle. Paramedian Forehead Flap Text: A decision was made to reconstruct the defect utilizing an interpolation axial flap and a staged reconstruction.  A telfa template was made of the defect.  This telfa template was then used to outline the paramedian forehead pedicle flap.  The donor area for the pedicle flap was then injected with anesthesia.  The flap was excised through the skin and subcutaneous tissue down to the layer of the underlying musculature.  The pedicle flap was carefully excised within this deep plane to maintain its blood supply.  The edges of the donor site were undermined.   The donor site was closed in a primary fashion.  The pedicle was then rotated into position and sutured.  Once the tube was sutured into place, adequate blood supply was confirmed with blanching and refill.  The pedicle was then wrapped with xeroform gauze and dressed appropriately with a telfa and gauze bandage to ensure continued blood supply and protect the attached pedicle. Abbe Flap (Upper To Lower Lip) Text: The defect of the lower lip was assessed and measured.  Given the location and size of the defect, an Abbe flap was deemed most appropriate. Using a sterile surgical marker, an appropriate Abbe flap was measured and drawn on the upper lip. Local anesthesia was then infiltrated.  A scalpel was then used to incise the upper lip through and through the skin, vermilion, muscle and mucosa, leaving the flap pedicled on the opposite side.  The flap was then rotated and transferred to the lower lip defect.  The flap was then sutured into place with a three layer technique, closing the orbicularis oris muscle layer with subcutaneous buried sutures, followed by a mucosal layer and an epidermal layer. Abbe Flap (Lower To Upper Lip) Text: The defect of the upper lip was assessed and measured.  Given the location and size of the defect, an Abbe flap was deemed most appropriate. Using a sterile surgical marker, an appropriate Abbe flap was measured and drawn on the lower lip. Local anesthesia was then infiltrated. A scalpel was then used to incise the upper lip through and through the skin, vermilion, muscle and mucosa, leaving the flap pedicled on the opposite side.  The flap was then rotated and transferred to the lower lip defect.  The flap was then sutured into place with a three layer technique, closing the orbicularis oris muscle layer with subcutaneous buried sutures, followed by a mucosal layer and an epidermal layer. Estlander Flap (Upper To Lower Lip) Text: The defect of the lower lip was assessed and measured.  Given the location and size of the defect, an Estlander flap was deemed most appropriate. Using a sterile surgical marker, an appropriate Estlander flap was measured and drawn on the upper lip. Local anesthesia was then infiltrated. A scalpel was then used to incise the lateral aspect of the flap, through skin, muscle and mucosa, leaving the flap pedicled medially.  The flap was then rotated and positioned to fill the lower lip defect.  The flap was then sutured into place with a three layer technique, closing the orbicularis oris muscle layer with subcutaneous buried sutures, followed by a mucosal layer and an epidermal layer. Lip Wedge Excision Repair Text: Given the location of the defect and the proximity to free margins a full thickness wedge repair was deemed most appropriate.  Using a sterile surgical marker, the appropriate repair was drawn incorporating the defect and placing the expected incisions perpendicular to the vermilion border.  The vermilion border was also meticulously outlined to ensure appropriate reapproximation during the repair.  The area thus outlined was incised through and through with a #15 scalpel blade.  The muscularis and dermis were reaproximated with deep sutures following hemostasis. Care was taken to realign the vermilion border before proceeding with the superficial closure.  Once the vermilion was realigned the superfical and mucosal closure was finished. Ftsg Text: The defect edges were debeveled with a #15 scalpel blade.  Given the location of the defect, shape of the defect and the proximity to free margins a full thickness skin graft was deemed most appropriate.  Using a sterile surgical marker, the primary defect shape was transferred to the donor site. The area thus outlined was incised deep to adipose tissue with a #15 scalpel blade.  The harvested graft was then trimmed of adipose tissue until only dermis and epidermis was left.  The skin margins of the secondary defect were undermined to an appropriate distance in all directions utilizing iris scissors.  The secondary defect was closed with interrupted buried subcutaneous sutures.  The skin edges were then re-apposed with running  sutures.  The skin graft was then placed in the primary defect and oriented appropriately. Split-Thickness Skin Graft Text: The defect edges were debeveled with a #15 scalpel blade.  Given the location of the defect, shape of the defect and the proximity to free margins a split thickness skin graft was deemed most appropriate.  Using a sterile surgical marker, the primary defect shape was transferred to the donor site. The split thickness graft was then harvested.  The skin graft was then placed in the primary defect and oriented appropriately. Pinch Graft Text: The defect edges were debeveled with a #15 scalpel blade. Given the location of the defect, shape of the defect and the proximity to free margins a pinch graft was deemed most appropriate. Using a sterile surgical marker, the primary defect shape was transferred to the donor site. The area thus outlined was incised deep to adipose tissue with a #15 scalpel blade.  The harvested graft was then trimmed of adipose tissue until only dermis and epidermis was left. The skin margins of the secondary defect were undermined to an appropriate distance in all directions utilizing iris scissors.  The secondary defect was closed with interrupted buried subcutaneous sutures.  The skin edges were then re-apposed with running  sutures.  The skin graft was then placed in the primary defect and oriented appropriately. Burow's Graft Text: The defect edges were debeveled with a #15 scalpel blade. Given the location of the defect, shape of the defect, the proximity to free margins and the presence of a standing cone deformity a Burow's skin graft was deemed most appropriate. The standing cone was removed and this tissue was then trimmed to the shape of the primary defect. The adipose tissue was also removed until only dermis and epidermis were left.  The skin margins of the secondary defect were undermined to an appropriate distance in all directions utilizing iris scissors.  The secondary defect was closed with interrupted buried subcutaneous sutures.  The skin edges were then re-apposed with running  sutures.  The skin graft was then placed in the primary defect and oriented appropriately. Cartilage Graft Text: The defect edges were debeveled with a #15 scalpel blade.  Given the location of the defect, shape of the defect, the fact the defect involved a full thickness cartilage defect a cartilage graft was deemed most appropriate.  An appropriate donor site was identified, cleansed, and anesthetized. The cartilage graft was then harvested and transferred to the recipient site, oriented appropriately and then sutured into place.  The secondary defect was then repaired using a primary closure. Composite Graft Text: The defect edges were debeveled with a #15 scalpel blade.  Given the location of the defect, shape of the defect, the proximity to free margins and the fact the defect was full thickness a composite graft was deemed most appropriate.  The defect was outline and then transferred to the donor site.  A full thickness graft was then excised from the donor site. The graft was then placed in the primary defect, oriented appropriately and then sutured into place.  The secondary defect was then repaired using a primary closure. Epidermal Autograft Text: The defect edges were debeveled with a #15 scalpel blade.  Given the location of the defect, shape of the defect and the proximity to free margins an epidermal autograft was deemed most appropriate.  Using a sterile surgical marker, the primary defect shape was transferred to the donor site. The epidermal graft was then harvested.  The skin graft was then placed in the primary defect and oriented appropriately. Dermal Autograft Text: The defect edges were debeveled with a #15 scalpel blade.  Given the location of the defect, shape of the defect and the proximity to free margins a dermal autograft was deemed most appropriate.  Using a sterile surgical marker, the primary defect shape was transferred to the donor site. The area thus outlined was incised deep to adipose tissue with a #15 scalpel blade.  The harvested graft was then trimmed of adipose and epidermal tissue until only dermis was left.  The skin graft was then placed in the primary defect and oriented appropriately. Skin Substitute Text: The defect edges were debeveled with a #15 scalpel blade.  Given the location of the defect, shape of the defect and the proximity to free margins a skin substitute graft was deemed most appropriate.  The graft material was trimmed to fit the size of the defect. The graft was then placed in the primary defect and oriented appropriately. Tissue Cultured Epidermal Autograft Text: The defect edges were debeveled with a #15 scalpel blade.  Given the location of the defect, shape of the defect and the proximity to free margins a tissue cultured epidermal autograft was deemed most appropriate.  The graft was then trimmed to fit the size of the defect.  The graft was then placed in the primary defect and oriented appropriately. Xenograft Text: The defect edges were debeveled with a #15 scalpel blade.  Given the location of the defect, shape of the defect and the proximity to free margins a xenograft was deemed most appropriate.  The graft was then trimmed to fit the size of the defect.  The graft was then placed in the primary defect and oriented appropriately. Purse String (Intermediate) Text: Given the location of the defect and the characteristics of the surrounding skin a purse string intermediate closure was deemed most appropriate.  Undermining was performed circumfirentially around the surgical defect.  A purse string suture was then placed and tightened. Purse String (Simple) Text: Given the location of the defect and the characteristics of the surrounding skin a purse string simple closure was deemed most appropriate.  Undermining was performed circumferentially around the surgical defect.  A purse string suture was then placed and tightened. Partial Purse String (Intermediate) Text: Given the location of the defect and the characteristics of the surrounding skin an intermediate purse string closure was deemed most appropriate.  Undermining was performed circumferentially around the surgical defect.  A purse string suture was then placed and tightened. Wound tension of the circular defect prevented complete closure of the wound. Partial Purse String (Simple) Text: Given the location of the defect and the characteristics of the surrounding skin a simple purse string closure was deemed most appropriate.  Undermining was performed circumferentially around the surgical defect.  A purse string suture was then placed and tightened. Wound tension of the circular defect prevented complete closure of the wound. Complex Repair And Single Advancement Flap Text: The defect edges were debeveled with a #15 scalpel blade.  The primary defect was closed partially with a complex linear closure.  Given the location of the remaining defect, shape of the defect and the proximity to free margins a single advancement flap was deemed most appropriate for complete closure of the defect.  Using a sterile surgical marker, an appropriate advancement flap was drawn incorporating the defect and placing the expected incisions within the relaxed skin tension lines where possible.    The area thus outlined was incised deep to adipose tissue with a #15 scalpel blade.  The skin margins were undermined to an appropriate distance in all directions utilizing iris scissors. Complex Repair And Double Advancement Flap Text: The defect edges were debeveled with a #15 scalpel blade.  The primary defect was closed partially with a complex linear closure.  Given the location of the remaining defect, shape of the defect and the proximity to free margins a double advancement flap was deemed most appropriate for complete closure of the defect.  Using a sterile surgical marker, an appropriate advancement flap was drawn incorporating the defect and placing the expected incisions within the relaxed skin tension lines where possible.    The area thus outlined was incised deep to adipose tissue with a #15 scalpel blade.  The skin margins were undermined to an appropriate distance in all directions utilizing iris scissors. Complex Repair And Modified Advancement Flap Text: The defect edges were debeveled with a #15 scalpel blade.  The primary defect was closed partially with a complex linear closure.  Given the location of the remaining defect, shape of the defect and the proximity to free margins a modified advancement flap was deemed most appropriate for complete closure of the defect.  Using a sterile surgical marker, an appropriate advancement flap was drawn incorporating the defect and placing the expected incisions within the relaxed skin tension lines where possible.    The area thus outlined was incised deep to adipose tissue with a #15 scalpel blade.  The skin margins were undermined to an appropriate distance in all directions utilizing iris scissors. Complex Repair And A-T Advancement Flap Text: The defect edges were debeveled with a #15 scalpel blade.  The primary defect was closed partially with a complex linear closure.  Given the location of the remaining defect, shape of the defect and the proximity to free margins an A-T advancement flap was deemed most appropriate for complete closure of the defect.  Using a sterile surgical marker, an appropriate advancement flap was drawn incorporating the defect and placing the expected incisions within the relaxed skin tension lines where possible.    The area thus outlined was incised deep to adipose tissue with a #15 scalpel blade.  The skin margins were undermined to an appropriate distance in all directions utilizing iris scissors. Complex Repair And O-T Advancement Flap Text: The defect edges were debeveled with a #15 scalpel blade.  The primary defect was closed partially with a complex linear closure.  Given the location of the remaining defect, shape of the defect and the proximity to free margins an O-T advancement flap was deemed most appropriate for complete closure of the defect.  Using a sterile surgical marker, an appropriate advancement flap was drawn incorporating the defect and placing the expected incisions within the relaxed skin tension lines where possible.    The area thus outlined was incised deep to adipose tissue with a #15 scalpel blade.  The skin margins were undermined to an appropriate distance in all directions utilizing iris scissors. Complex Repair And O-L Flap Text: The defect edges were debeveled with a #15 scalpel blade.  The primary defect was closed partially with a complex linear closure.  Given the location of the remaining defect, shape of the defect and the proximity to free margins an O-L flap was deemed most appropriate for complete closure of the defect.  Using a sterile surgical marker, an appropriate flap was drawn incorporating the defect and placing the expected incisions within the relaxed skin tension lines where possible.    The area thus outlined was incised deep to adipose tissue with a #15 scalpel blade.  The skin margins were undermined to an appropriate distance in all directions utilizing iris scissors. Complex Repair And Bilobe Flap Text: The defect edges were debeveled with a #15 scalpel blade.  The primary defect was closed partially with a complex linear closure.  Given the location of the remaining defect, shape of the defect and the proximity to free margins a bilobe flap was deemed most appropriate for complete closure of the defect.  Using a sterile surgical marker, an appropriate advancement flap was drawn incorporating the defect and placing the expected incisions within the relaxed skin tension lines where possible.    The area thus outlined was incised deep to adipose tissue with a #15 scalpel blade.  The skin margins were undermined to an appropriate distance in all directions utilizing iris scissors. Complex Repair And Melolabial Flap Text: The defect edges were debeveled with a #15 scalpel blade.  The primary defect was closed partially with a complex linear closure.  Given the location of the remaining defect, shape of the defect and the proximity to free margins a melolabial flap was deemed most appropriate for complete closure of the defect.  Using a sterile surgical marker, an appropriate advancement flap was drawn incorporating the defect and placing the expected incisions within the relaxed skin tension lines where possible.    The area thus outlined was incised deep to adipose tissue with a #15 scalpel blade.  The skin margins were undermined to an appropriate distance in all directions utilizing iris scissors. Complex Repair And Rotation Flap Text: The defect edges were debeveled with a #15 scalpel blade.  The primary defect was closed partially with a complex linear closure.  Given the location of the remaining defect, shape of the defect and the proximity to free margins a rotation flap was deemed most appropriate for complete closure of the defect.  Using a sterile surgical marker, an appropriate advancement flap was drawn incorporating the defect and placing the expected incisions within the relaxed skin tension lines where possible.    The area thus outlined was incised deep to adipose tissue with a #15 scalpel blade.  The skin margins were undermined to an appropriate distance in all directions utilizing iris scissors. Complex Repair And Rhombic Flap Text: The defect edges were debeveled with a #15 scalpel blade.  The primary defect was closed partially with a complex linear closure.  Given the location of the remaining defect, shape of the defect and the proximity to free margins a rhombic flap was deemed most appropriate for complete closure of the defect.  Using a sterile surgical marker, an appropriate advancement flap was drawn incorporating the defect and placing the expected incisions within the relaxed skin tension lines where possible.    The area thus outlined was incised deep to adipose tissue with a #15 scalpel blade.  The skin margins were undermined to an appropriate distance in all directions utilizing iris scissors. Complex Repair And Transposition Flap Text: The defect edges were debeveled with a #15 scalpel blade.  The primary defect was closed partially with a complex linear closure.  Given the location of the remaining defect, shape of the defect and the proximity to free margins a transposition flap was deemed most appropriate for complete closure of the defect.  Using a sterile surgical marker, an appropriate advancement flap was drawn incorporating the defect and placing the expected incisions within the relaxed skin tension lines where possible.    The area thus outlined was incised deep to adipose tissue with a #15 scalpel blade.  The skin margins were undermined to an appropriate distance in all directions utilizing iris scissors. Complex Repair And V-Y Plasty Text: The defect edges were debeveled with a #15 scalpel blade.  The primary defect was closed partially with a complex linear closure.  Given the location of the remaining defect, shape of the defect and the proximity to free margins a V-Y plasty was deemed most appropriate for complete closure of the defect.  Using a sterile surgical marker, an appropriate advancement flap was drawn incorporating the defect and placing the expected incisions within the relaxed skin tension lines where possible.    The area thus outlined was incised deep to adipose tissue with a #15 scalpel blade.  The skin margins were undermined to an appropriate distance in all directions utilizing iris scissors. Complex Repair And M Plasty Text: The defect edges were debeveled with a #15 scalpel blade.  The primary defect was closed partially with a complex linear closure.  Given the location of the remaining defect, shape of the defect and the proximity to free margins an M plasty was deemed most appropriate for complete closure of the defect.  Using a sterile surgical marker, an appropriate advancement flap was drawn incorporating the defect and placing the expected incisions within the relaxed skin tension lines where possible.    The area thus outlined was incised deep to adipose tissue with a #15 scalpel blade.  The skin margins were undermined to an appropriate distance in all directions utilizing iris scissors. Complex Repair And Double M Plasty Text: The defect edges were debeveled with a #15 scalpel blade.  The primary defect was closed partially with a complex linear closure.  Given the location of the remaining defect, shape of the defect and the proximity to free margins a double M plasty was deemed most appropriate for complete closure of the defect.  Using a sterile surgical marker, an appropriate advancement flap was drawn incorporating the defect and placing the expected incisions within the relaxed skin tension lines where possible.    The area thus outlined was incised deep to adipose tissue with a #15 scalpel blade.  The skin margins were undermined to an appropriate distance in all directions utilizing iris scissors. Complex Repair And W Plasty Text: The defect edges were debeveled with a #15 scalpel blade.  The primary defect was closed partially with a complex linear closure.  Given the location of the remaining defect, shape of the defect and the proximity to free margins a W plasty was deemed most appropriate for complete closure of the defect.  Using a sterile surgical marker, an appropriate advancement flap was drawn incorporating the defect and placing the expected incisions within the relaxed skin tension lines where possible.    The area thus outlined was incised deep to adipose tissue with a #15 scalpel blade.  The skin margins were undermined to an appropriate distance in all directions utilizing iris scissors. Complex Repair And Z Plasty Text: The defect edges were debeveled with a #15 scalpel blade.  The primary defect was closed partially with a complex linear closure.  Given the location of the remaining defect, shape of the defect and the proximity to free margins a Z plasty was deemed most appropriate for complete closure of the defect.  Using a sterile surgical marker, an appropriate advancement flap was drawn incorporating the defect and placing the expected incisions within the relaxed skin tension lines where possible.    The area thus outlined was incised deep to adipose tissue with a #15 scalpel blade.  The skin margins were undermined to an appropriate distance in all directions utilizing iris scissors. Complex Repair And Dorsal Nasal Flap Text: The defect edges were debeveled with a #15 scalpel blade.  The primary defect was closed partially with a complex linear closure.  Given the location of the remaining defect, shape of the defect and the proximity to free margins a dorsal nasal flap was deemed most appropriate for complete closure of the defect.  Using a sterile surgical marker, an appropriate flap was drawn incorporating the defect and placing the expected incisions within the relaxed skin tension lines where possible.    The area thus outlined was incised deep to adipose tissue with a #15 scalpel blade.  The skin margins were undermined to an appropriate distance in all directions utilizing iris scissors. Complex Repair And Ftsg Text: The defect edges were debeveled with a #15 scalpel blade.  The primary defect was closed partially with a complex linear closure.  Given the location of the defect, shape of the defect and the proximity to free margins a full thickness skin graft was deemed most appropriate to repair the remaining defect.  The graft was trimmed to fit the size of the remaining defect.  The graft was then placed in the primary defect, oriented appropriately, and sutured into place. Complex Repair And Burow's Graft Text: The defect edges were debeveled with a #15 scalpel blade.  The primary defect was closed partially with a complex linear closure.  Given the location of the defect, shape of the defect, the proximity to free margins and the presence of a standing cone deformity a Burow's graft was deemed most appropriate to repair the remaining defect.  The graft was trimmed to fit the size of the remaining defect.  The graft was then placed in the primary defect, oriented appropriately, and sutured into place. Complex Repair And Split-Thickness Skin Graft Text: The defect edges were debeveled with a #15 scalpel blade.  The primary defect was closed partially with a complex linear closure.  Given the location of the defect, shape of the defect and the proximity to free margins a split thickness skin graft was deemed most appropriate to repair the remaining defect.  The graft was trimmed to fit the size of the remaining defect.  The graft was then placed in the primary defect, oriented appropriately, and sutured into place. Complex Repair And Epidermal Autograft Text: The defect edges were debeveled with a #15 scalpel blade.  The primary defect was closed partially with a complex linear closure.  Given the location of the defect, shape of the defect and the proximity to free margins an epidermal autograft was deemed most appropriate to repair the remaining defect.  The graft was trimmed to fit the size of the remaining defect.  The graft was then placed in the primary defect, oriented appropriately, and sutured into place. Complex Repair And Dermal Autograft Text: The defect edges were debeveled with a #15 scalpel blade.  The primary defect was closed partially with a complex linear closure.  Given the location of the defect, shape of the defect and the proximity to free margins an dermal autograft was deemed most appropriate to repair the remaining defect.  The graft was trimmed to fit the size of the remaining defect.  The graft was then placed in the primary defect, oriented appropriately, and sutured into place. Complex Repair And Tissue Cultured Epidermal Autograft Text: The defect edges were debeveled with a #15 scalpel blade.  The primary defect was closed partially with a complex linear closure.  Given the location of the defect, shape of the defect and the proximity to free margins an tissue cultured epidermal autograft was deemed most appropriate to repair the remaining defect.  The graft was trimmed to fit the size of the remaining defect.  The graft was then placed in the primary defect, oriented appropriately, and sutured into place. Complex Repair And Xenograft Text: The defect edges were debeveled with a #15 scalpel blade.  The primary defect was closed partially with a complex linear closure.  Given the location of the defect, shape of the defect and the proximity to free margins a xenograft was deemed most appropriate to repair the remaining defect.  The graft was trimmed to fit the size of the remaining defect.  The graft was then placed in the primary defect, oriented appropriately, and sutured into place. Complex Repair And Skin Substitute Graft Text: The defect edges were debeveled with a #15 scalpel blade.  The primary defect was closed partially with a complex linear closure.  Given the location of the remaining defect, shape of the defect and the proximity to free margins a skin substitute graft was deemed most appropriate to repair the remaining defect.  The graft was trimmed to fit the size of the remaining defect.  The graft was then placed in the primary defect, oriented appropriately, and sutured into place. Path Notes (To The Dermatopathologist): Please check margins. Consent: Written consent was obtained from the patient. The risks and benefits to therapy were discussed in detail. Specifically, the risks of infection, scarring, bleeding, prolonged wound healing, incomplete removal, allergy to anesthesia, nerve injury and recurrence were addressed. Prior to the procedure, the treatment site was clearly identified and confirmed by the patient. All components of Universal Protocol/PAUSE Rule completed. Post-Care Instructions: I reviewed with the patient in detail post-care instructions. Patient is not to engage in any heavy lifting, exercise, or swimming for the next 14 days. Should the patient develop any fevers, chills, bleeding, severe pain patient will contact the office immediately. Home Suture Removal Text: Patient was provided a home suture removal kit and will remove their sutures at home.  If they have any questions or difficulties they will call the office. Where Do You Want The Question To Include Opioid Counseling Located?: Case Summary Tab Information: Selecting Yes will display possible errors in your note based on the variables you have selected. This validation is only offered as a suggestion for you. PLEASE NOTE THAT THE VALIDATION TEXT WILL BE REMOVED WHEN YOU FINALIZE YOUR NOTE. IF YOU WANT TO FAX A PRELIMINARY NOTE YOU WILL NEED TO TOGGLE THIS TO 'NO' IF YOU DO NOT WANT IT IN YOUR FAXED NOTE.

## 2023-10-17 NOTE — BH TREATMENT PLAN - NSTXCONFGOAL_PSY_ALL_CORE
Will ask for assistance as required

## 2023-10-17 NOTE — BH TREATMENT PLAN - NSTXPROBCONF_PSY_ALL_CORE
CONFUSION, ACUTE/CHRONIC

## 2023-10-17 NOTE — BH TREATMENT PLAN - NSTXSUICIDINTERPR_PSY_ALL_CORE
The writer met with the patient to assess progress of their psychiatric rehabilitation goal over the past week. The patient made no improvement towards her goal to identify and utilize 2 coping skills for her suicide/ SIB goal. The patient did not report utilizing coping skills. The patient is compliant with her medications, does not engage with her peers, and has fair ADL’s and behavioral control. Over the next seven days, Psychiatric Rehabilitation staff will continue to provide encouragement, support, psychotherapy, and psychoeducation to assist the patient in the progression of her treatment goal and engagement with activities. The writer was unable to assess for SI/HI/AH/VH due to the patient wanting to end the session.
The writer attempted to meet with the patient to assess progress of their psychiatric rehabilitation goal over the past week. The writer was unable to assess for the patient’s improvement towards her psychiatric rehabilitation goal to identify and utilize 2 coping skills for her suicide/ SIB goal. Per the treatment team, the patient isolates to her room. The patient does not engage with her peers, takes her medications, and has fair ADL’s and behavioral control. Over the next seven days, Psychiatric Rehabilitation staff will continue to provide encouragement, support, therapy, and psychoeducation to assist the patient in the progression of her treatment goal and engagement with activities. The writer was unable to assess for SI/HI/AH/VH.
Over the next 7 days, Psychiatric Rehabilitation staff will utilize group and individual psychotherapy, and psychoeducation to assist the patient in reaching her treatment goal.
Patient made some progress towards her treatment goal evidenced by her ability to identify journaling, listen to soft music, and walking as effective coping skills. Over the next seven days, Psychiatric rehabilitation staff will continue to provide encouragement, support, psychotherapy, and psychoeducation to assist the patient in strenthening coping skill exploration and utilization.
Patient made some progress towards her treatment goal evidenced by her ability to identify journaling, listen to soft music, and walking as effective coping skills. Over the next seven days, Psychiatric rehabilitation staff will continue to provide encouragement, support, psychotherapy, and psychoeducation to assist the patient in strenthening coping skill exploration and utilization.
Over the next 7 days, Psychiatric Rehabilitation staff will utilize group and individual psychotherapy, and psychoeducation to assist the patient in reaching her treatment goal.

## 2023-10-17 NOTE — BH TREATMENT PLAN - NSBHPRIMARYDX_PSY_ALL_CORE
Bipolar 1 disorder    

## 2023-10-17 NOTE — BH TREATMENT PLAN - NSTXPSYCHOGOAL_PSY_ALL_CORE
Will engage in a 15 minute conversation with no irrational content
7 Mm Punch Excision Text: A 7 mm punch biopsy was used to excise the lesion to the level of the subcutaneous fat.  Blunt dissection was used to free the lesion from the surrounding tissues and the lesion was removed.

## 2023-10-17 NOTE — BH TREATMENT PLAN - NSTXSUICIDINTERMD_PSY_ALL_CORE
Med management, group and milieu therapy

## 2023-10-18 PROCEDURE — 99232 SBSQ HOSP IP/OBS MODERATE 35: CPT

## 2023-10-18 RX ADMIN — QUETIAPINE FUMARATE 50 MILLIGRAM(S): 200 TABLET, FILM COATED ORAL at 09:13

## 2023-10-18 RX ADMIN — BUPROPION HYDROCHLORIDE 150 MILLIGRAM(S): 150 TABLET, EXTENDED RELEASE ORAL at 09:13

## 2023-10-18 RX ADMIN — Medication 3 MILLIGRAM(S): at 20:42

## 2023-10-18 RX ADMIN — DIVALPROEX SODIUM 1000 MILLIGRAM(S): 500 TABLET, DELAYED RELEASE ORAL at 20:42

## 2023-10-18 RX ADMIN — DONEPEZIL HYDROCHLORIDE 10 MILLIGRAM(S): 10 TABLET, FILM COATED ORAL at 20:41

## 2023-10-18 RX ADMIN — QUETIAPINE FUMARATE 100 MILLIGRAM(S): 200 TABLET, FILM COATED ORAL at 20:42

## 2023-10-18 NOTE — BH INPATIENT PSYCHIATRY PROGRESS NOTE - NSBHFUPINTERVALHXFT_PSY_A_CORE
Patient was seen for f/u for Bipolar disorder, cognitive impairment. Chart reviewed and case discussed in team meeting. Patient reported having "a bad day today", was tearful, could not elaborate on what was going on or different today. Patient reported poor sleep last night, encouraged patient seek nursing help if insomnia persists tonight. Patient was able to remember groups attended today and whether she sat through the whole group. Patient did express concern she hasn't been able to get in touch with family. Patient visible on the unit, attending groups. Patient denied any SIIP.  Encouraged patient to monitor fluid intake, nursing staff to monitor fluids.

## 2023-10-18 NOTE — BH INPATIENT PSYCHIATRY PROGRESS NOTE - NSBHASSESSSUMMFT_PSY_ALL_CORE
Patient is a 57y/o F  but in a relationship, domiciled w/  daughter, her boyfriend, employed as a transport dispatch at [employer], w/ PPHx of bipolar d/o followed in [facility], w/ 5 prior psychiatric hospitalizations most recently discharged from [facility] 9/7/23 for montserrat (9.13 admission, described as irritability, sleep disturbances, racing thoughts, restlessness), as well as psychotic symptoms (reported to be non command auditory hallucinations, delusional ideas of reference). , no known suicide attempt or violence hx, no known legal hx, no known substance abuse history who was brought in by EMS for concerns for suicidal ideation and bipolar decompensation.    Differential Diagnosis: Bipolar Disorder, Bipolar disorder with psychotic features, Schizoaffective disorder    On assessment, patient was more organized, brighter, more cooperative, more verbal, continues to have difficulty with word finding, denied SIIP.     Plan:   -Admit to [facility], 9.39 ahs been converted to a [facility]  -Routine observation q15 checks, No CO needed in a locked, supervised setting, taking meds.   -Psychiatry:   c/w Depakote ER 1000mg at bedtime for Bipolar d/o, c/w Seroquel to 50 mg daily and decreased to 100mg at bedtime for Mood/psychosis, c/w Buproprion XL 150mg daily for depressive episode and c/w Donepezil 10mg at bedtime for cognitive impairment.   diphenhydrAMINE 50 milliGRAM(s) Oral every 6 hours PRN agitation  diphenhydrAMINE Injectable 50 milliGRAM(s) IntraMuscular once PRN agitation  haloperidol     Tablet 2 milliGRAM(s) Oral every 6 hours PRN agitation  haloperidol    Injectable 2 milliGRAM(s) IntraMuscular Once PRN agitation  LORazepam     Tablet 1 milliGRAM(s) Oral every 6 hours PRN Agitation  LORazepam   Injectable 1 milliGRAM(s) IntraMuscular Once PRN Agitation  -Medical: Dry mouth; Biotene ordered   -Group and milieu therapy   -Dispo as per social work

## 2023-10-18 NOTE — BH INPATIENT PSYCHIATRY PROGRESS NOTE - NSBHCHARTREVIEWVS_PSY_A_CORE FT
Vital Signs Last 24 Hrs  T(C): 36.8 (10-18-23 @ 08:17), Max: 36.8 (10-18-23 @ 08:17)  T(F): 98.3 (10-18-23 @ 08:17), Max: 98.3 (10-18-23 @ 08:17)  HR: --  BP: --  BP(mean): --  RR: 20 (10-18-23 @ 08:17) (20 - 20)  SpO2: 99% (10-18-23 @ 08:17) (99% - 99%)    Orthostatic VS  10-18-23 @ 08:17  Lying BP: --/-- HR: --  Sitting BP: 140/76 HR: 97  Standing BP: 150/80 HR: 100  Site: --  Mode: --  Orthostatic VS  10-17-23 @ 07:28  Lying BP: --/-- HR: --  Sitting BP: 139/78 HR: 103  Standing BP: 125/74 HR: 112  Site: --  Mode: --

## 2023-10-19 PROCEDURE — 99232 SBSQ HOSP IP/OBS MODERATE 35: CPT

## 2023-10-19 RX ADMIN — Medication 3 MILLIGRAM(S): at 21:51

## 2023-10-19 RX ADMIN — DIVALPROEX SODIUM 1000 MILLIGRAM(S): 500 TABLET, DELAYED RELEASE ORAL at 21:49

## 2023-10-19 RX ADMIN — QUETIAPINE FUMARATE 100 MILLIGRAM(S): 200 TABLET, FILM COATED ORAL at 21:49

## 2023-10-19 RX ADMIN — BUPROPION HYDROCHLORIDE 150 MILLIGRAM(S): 150 TABLET, EXTENDED RELEASE ORAL at 08:34

## 2023-10-19 RX ADMIN — QUETIAPINE FUMARATE 50 MILLIGRAM(S): 200 TABLET, FILM COATED ORAL at 08:34

## 2023-10-19 RX ADMIN — DONEPEZIL HYDROCHLORIDE 10 MILLIGRAM(S): 10 TABLET, FILM COATED ORAL at 21:49

## 2023-10-19 NOTE — BH INPATIENT PSYCHIATRY PROGRESS NOTE - NSBHFUPINTERVALHXFT_PSY_A_CORE
f/up dementia, bipolar d/o, care discussed w/ tx team, VSS. Patient reported that she does not remember the sx she came in with. Patient was pleasant on approach, reported that she attends some groups. Reported motivation and focus was good with fair energy level. Patient reported decreased sleep and with fair appetite.

## 2023-10-19 NOTE — BH INPATIENT PSYCHIATRY PROGRESS NOTE - NSBHCHARTREVIEWVS_PSY_A_CORE FT
Vital Signs Last 24 Hrs  T(C): 36.3 (10-19-23 @ 07:39), Max: 36.3 (10-19-23 @ 07:39)  T(F): 97.3 (10-19-23 @ 07:39), Max: 97.3 (10-19-23 @ 07:39)  HR: --  BP: --  BP(mean): --  RR: 20 (10-19-23 @ 07:39) (20 - 20)  SpO2: 99% (10-19-23 @ 07:39) (99% - 99%)    Orthostatic VS  10-19-23 @ 07:39  Lying BP: --/-- HR: --  Sitting BP: 139/81 HR: 76  Standing BP: 140/80 HR: 81  Site: --  Mode: --  Orthostatic VS  10-18-23 @ 08:17  Lying BP: --/-- HR: --  Sitting BP: 140/76 HR: 97  Standing BP: 150/80 HR: 100  Site: --  Mode: --

## 2023-10-19 NOTE — BH INPATIENT PSYCHIATRY PROGRESS NOTE - NSBHASSESSSUMMFT_PSY_ALL_CORE
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 , no known suicide attempt or violence hx, no known legal hx, no known substance abuse history who was brought in by EMS for concerns for suicidal ideation and bipolar decompensation.    Differential Diagnosis: Bipolar Disorder, Bipolar disorder with psychotic features, Schizoaffective disorder    On assessment, patient reported improved mood, with constricted affect, with mild confusion, redirectable,  denied SIIP.     Plan:   -Admit to [facility], 9.39 ahs been converted to a [facility]  -Routine observation q15 checks, No CO needed in a locked, supervised setting, taking meds.   -Psychiatry:   c/w Depakote ER 1000mg at bedtime for Bipolar d/o, c/w Seroquel to 50 mg daily and decreased to 100mg at bedtime for Mood/psychosis, c/w Buproprion XL 150mg daily for depressive episode and c/w Donepezil 10mg at bedtime for cognitive impairment.   diphenhydrAMINE 50 milliGRAM(s) Oral every 6 hours PRN agitation  diphenhydrAMINE Injectable 50 milliGRAM(s) IntraMuscular once PRN agitation  haloperidol     Tablet 2 milliGRAM(s) Oral every 6 hours PRN agitation  haloperidol    Injectable 2 milliGRAM(s) IntraMuscular Once PRN agitation  LORazepam     Tablet 1 milliGRAM(s) Oral every 6 hours PRN Agitation  LORazepam   Injectable 1 milliGRAM(s) IntraMuscular Once PRN Agitation  -Medical: Dry mouth; Biotene ordered   -Group and milieu therapy   -Dispo as per social work

## 2023-10-20 PROCEDURE — 99232 SBSQ HOSP IP/OBS MODERATE 35: CPT

## 2023-10-20 RX ADMIN — BUPROPION HYDROCHLORIDE 150 MILLIGRAM(S): 150 TABLET, EXTENDED RELEASE ORAL at 08:33

## 2023-10-20 RX ADMIN — QUETIAPINE FUMARATE 50 MILLIGRAM(S): 200 TABLET, FILM COATED ORAL at 08:34

## 2023-10-20 RX ADMIN — QUETIAPINE FUMARATE 100 MILLIGRAM(S): 200 TABLET, FILM COATED ORAL at 21:51

## 2023-10-20 RX ADMIN — Medication 3 MILLIGRAM(S): at 21:49

## 2023-10-20 RX ADMIN — DONEPEZIL HYDROCHLORIDE 10 MILLIGRAM(S): 10 TABLET, FILM COATED ORAL at 21:49

## 2023-10-20 RX ADMIN — DIVALPROEX SODIUM 1000 MILLIGRAM(S): 500 TABLET, DELAYED RELEASE ORAL at 21:49

## 2023-10-20 NOTE — BH INPATIENT PSYCHIATRY PROGRESS NOTE - NSBHFUPINTERVALHXFT_PSY_A_CORE
Patient was seen for f/up for dementia, bipolar d/o, care discussed w/ tx team, JAVIER. Patient reported feeling "gloomy and lazy today", did get up for breakfast, will make sure to have lunch and dinner. Patient reported feeling better later after taking a shower. Patient was encouraged to continue attending groups, staff reported she has been doing well, engaged. Patient reported improved sleep last night. Patient denied SIIP, AVH. Discussed upcoming discharge, patient looking forward to discharge.

## 2023-10-20 NOTE — BH DISCHARGE NOTE NURSING/SOCIAL WORK/PSYCH REHAB - NSCDUDCCRISIS_PSY_A_CORE
[facility]  1 [phone]) [facility] ([phone])  Text "WELL" to [phone]  Website: www.[employer]/.[employer] [facility] 1 (392) 621-IRYB (7535) Website: www.safehorizon.org/.National Suicide Prevention Lifeline 6 (906) 753-8941/.  [facility]  1 (927) LIFEN[facility] (589-7112)/.  [facility]  [phone] [place] [phone]   Hours:  Monday through Friday from 9 AM to 3 PM/988 Suicide and Crisis Lifeline

## 2023-10-20 NOTE — BH DISCHARGE NOTE NURSING/SOCIAL WORK/PSYCH REHAB - NSDCADDINFO1FT_PSY_ALL_CORE
You have an appointment with [facility] - located at [address] [place] [address]. Your appointment is on Friday, 10/27/23 at 10AM with Dr. Murtaza Myles. You can contact the office at .

## 2023-10-20 NOTE — BH INPATIENT PSYCHIATRY PROGRESS NOTE - NSBHCHARTREVIEWVS_PSY_A_CORE FT
Vital Signs Last 24 Hrs  T(C): 36.8 (10-20-23 @ 07:09), Max: 36.8 (10-20-23 @ 07:09)  T(F): 98.3 (10-20-23 @ 07:09), Max: 98.3 (10-20-23 @ 07:09)  HR: --  BP: --  BP(mean): --  RR: 20 (10-20-23 @ 07:09) (20 - 20)  SpO2: 99% (10-20-23 @ 07:09) (99% - 99%)    Orthostatic VS  10-20-23 @ 07:09  Lying BP: --/-- HR: --  Sitting BP: 140/74 HR: 79  Standing BP: 155/86 HR: 80  Site: --  Mode: --  Orthostatic VS  10-19-23 @ 07:39  Lying BP: --/-- HR: --  Sitting BP: 139/81 HR: 76  Standing BP: 140/80 HR: 81  Site: --  Mode: --

## 2023-10-20 NOTE — BH DISCHARGE NOTE NURSING/SOCIAL WORK/PSYCH REHAB - DISCHARGE INSTRUCTIONS AFTERCARE APPOINTMENTS
In order to check the location, date, or time of your aftercare appointment, please refer to your Discharge Instructions Document given to you upon leaving the hospital.  If you have lost the instructions please call [phone]

## 2023-10-20 NOTE — BH INPATIENT PSYCHIATRY PROGRESS NOTE - NSBHASSESSSUMMFT_PSY_ALL_CORE
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 , no known suicide attempt or violence hx, no known legal hx, no known substance abuse history who was brought in by EMS for concerns for suicidal ideation and bipolar decompensation.    Differential Diagnosis: Bipolar Disorder, Bipolar disorder with psychotic features, Schizoaffective disorder    On assessment, patient reported improved mood, with constricted affect, with mild confusion, redirectable,  denied SIIP.     Plan:   -Admit to [facility], 9.39 ahs been converted to a [facility]  -Routine observation q15 checks, No CO needed in a locked, supervised setting, taking meds.   -Psychiatry:   c/w Depakote ER 1000mg at bedtime for Bipolar d/o, c/w Seroquel to 50 mg daily and decreased to 100mg at bedtime for Mood/psychosis, c/w Buproprion XL 150mg daily for depressive episode and c/w Donepezil 10mg at bedtime for cognitive impairment.   diphenhydrAMINE 50 milliGRAM(s) Oral every 6 hours PRN agitation  diphenhydrAMINE Injectable 50 milliGRAM(s) IntraMuscular once PRN agitation  haloperidol     Tablet 2 milliGRAM(s) Oral every 6 hours PRN agitation  haloperidol    Injectable 2 milliGRAM(s) IntraMuscular Once PRN agitation  LORazepam     Tablet 1 milliGRAM(s) Oral every 6 hours PRN Agitation  LORazepam   Injectable 1 milliGRAM(s) IntraMuscular Once PRN Agitation  -Medical: Dry mouth; Biotene ordered   -Group and milieu therapy   -Dispo as per social work

## 2023-10-20 NOTE — BH DISCHARGE NOTE NURSING/SOCIAL WORK/PSYCH REHAB - PATIENT PORTAL LINK FT
You can access the FollowMyHealth Patient Portal offered by [facility] by registering at the following website: [email or web address]. By joining [employer]’s FollowMyHealth portal, you will also be able to view your health information using other applications (apps) compatible with our system.

## 2023-10-20 NOTE — BH DISCHARGE NOTE NURSING/SOCIAL WORK/PSYCH REHAB - NSDCPRGOAL_PSY_ALL_CORE
Per the patient’s chart, she was BIBEMS for concerns for suicidal ideation and bipolar decompensation. The patient met her psychiatric rehabilitation goal to identify and utilize 2 coping skills for her suicide/ SIB goal. The patient reported going to groups, meditation, and listening to music as her coping skills. The patient was compliant with her medications, engaged with her peers, and had good ADL’s and behavioral control. The patient denied SI/HI/AH/VH. The patient attended 60% of the Psychiatric Rehabilitation groups throughout her stay. Psychiatric Rehabilitation staff recommends that the patient engage in outpatient services for continued medication and symptom management.

## 2023-10-21 RX ADMIN — Medication 3 MILLIGRAM(S): at 21:54

## 2023-10-21 RX ADMIN — DIVALPROEX SODIUM 1000 MILLIGRAM(S): 500 TABLET, DELAYED RELEASE ORAL at 21:54

## 2023-10-21 RX ADMIN — QUETIAPINE FUMARATE 100 MILLIGRAM(S): 200 TABLET, FILM COATED ORAL at 21:53

## 2023-10-21 RX ADMIN — QUETIAPINE FUMARATE 50 MILLIGRAM(S): 200 TABLET, FILM COATED ORAL at 09:41

## 2023-10-21 RX ADMIN — DONEPEZIL HYDROCHLORIDE 10 MILLIGRAM(S): 10 TABLET, FILM COATED ORAL at 21:54

## 2023-10-21 RX ADMIN — BUPROPION HYDROCHLORIDE 150 MILLIGRAM(S): 150 TABLET, EXTENDED RELEASE ORAL at 09:41

## 2023-10-22 RX ADMIN — DIVALPROEX SODIUM 1000 MILLIGRAM(S): 500 TABLET, DELAYED RELEASE ORAL at 21:35

## 2023-10-22 RX ADMIN — QUETIAPINE FUMARATE 50 MILLIGRAM(S): 200 TABLET, FILM COATED ORAL at 08:37

## 2023-10-22 RX ADMIN — Medication 3 MILLIGRAM(S): at 21:35

## 2023-10-22 RX ADMIN — DONEPEZIL HYDROCHLORIDE 10 MILLIGRAM(S): 10 TABLET, FILM COATED ORAL at 21:35

## 2023-10-22 RX ADMIN — BUPROPION HYDROCHLORIDE 150 MILLIGRAM(S): 150 TABLET, EXTENDED RELEASE ORAL at 08:36

## 2023-10-22 RX ADMIN — QUETIAPINE FUMARATE 100 MILLIGRAM(S): 200 TABLET, FILM COATED ORAL at 21:35

## 2023-10-23 PROCEDURE — 99238 HOSP IP/OBS DSCHRG MGMT 30/<: CPT

## 2023-10-23 RX ORDER — QUETIAPINE FUMARATE 200 MG/1
1 TABLET, FILM COATED ORAL
Qty: 30 | Refills: 0
Start: 2023-10-23 | End: 2023-11-21

## 2023-10-23 RX ORDER — SALIVA SUBSTITUTE COMB NO.11 351 MG
15 POWDER IN PACKET (EA) MUCOUS MEMBRANE
Qty: 1 | Refills: 0
Start: 2023-10-23 | End: 2023-11-21

## 2023-10-23 RX ORDER — LANOLIN ALCOHOL/MO/W.PET/CERES
1 CREAM (GRAM) TOPICAL
Qty: 30 | Refills: 0
Start: 2023-10-23 | End: 2023-11-21

## 2023-10-23 RX ORDER — DONEPEZIL HYDROCHLORIDE 10 MG/1
1 TABLET, FILM COATED ORAL
Qty: 30 | Refills: 0
Start: 2023-10-23 | End: 2023-11-21

## 2023-10-23 RX ORDER — DIVALPROEX SODIUM 500 MG/1
2 TABLET, DELAYED RELEASE ORAL
Qty: 60 | Refills: 0
Start: 2023-10-23 | End: 2023-11-21

## 2023-10-23 RX ORDER — BUPROPION HYDROCHLORIDE 150 MG/1
1 TABLET, EXTENDED RELEASE ORAL
Qty: 30 | Refills: 0
Start: 2023-10-23 | End: 2023-11-21

## 2023-10-23 RX ADMIN — QUETIAPINE FUMARATE 100 MILLIGRAM(S): 200 TABLET, FILM COATED ORAL at 21:40

## 2023-10-23 RX ADMIN — QUETIAPINE FUMARATE 50 MILLIGRAM(S): 200 TABLET, FILM COATED ORAL at 09:56

## 2023-10-23 RX ADMIN — BUPROPION HYDROCHLORIDE 150 MILLIGRAM(S): 150 TABLET, EXTENDED RELEASE ORAL at 09:56

## 2023-10-23 RX ADMIN — Medication 3 MILLIGRAM(S): at 21:39

## 2023-10-23 RX ADMIN — DIVALPROEX SODIUM 1000 MILLIGRAM(S): 500 TABLET, DELAYED RELEASE ORAL at 21:40

## 2023-10-23 RX ADMIN — DONEPEZIL HYDROCHLORIDE 10 MILLIGRAM(S): 10 TABLET, FILM COATED ORAL at 21:39

## 2023-10-23 NOTE — BH INPATIENT PSYCHIATRY PROGRESS NOTE - NSBHFUPINTERVALHXFT_PSY_A_CORE
Patient was seen for f/up for dementia, bipolar d/o, care discussed w/ tx team, JAVIER. Patient reported feeling "down" today, appeared sad, reported it was her Mother's birthday over the weekend which made her sad but realizes she has to push herself. Patient reported even made herself get up even though she felt like staying in bed. Patient reported October and February are hard for her because most of her family is . Discussed upcoming discharge, patient looking forward to going home and spending time with her kids. Discussed continuing treatment including medications and appointments. Patient denied SIIP, AVH.

## 2023-10-23 NOTE — BH INPATIENT PSYCHIATRY PROGRESS NOTE - NSICDXBHSECONDARYDX_PSY_ALL_CORE
Dementia   F03.90  Lithium toxicity, undetermined intent, subsequent encounter   T56.894D  Renal impairment   N28.9  

## 2023-10-23 NOTE — BH INPATIENT PSYCHIATRY PROGRESS NOTE - NSBHATTESTATTENDBILLTIME_PSY_A_CORE
I independently performed the documented

## 2023-10-23 NOTE — BH INPATIENT PSYCHIATRY PROGRESS NOTE - NSBHATTESTATTENDNAMEFT_PSY_A_CORE
Dr Perez

## 2023-10-23 NOTE — BH INPATIENT PSYCHIATRY DISCHARGE NOTE - OTHER PAST PSYCHIATRIC HISTORY (INCLUDE DETAILS REGARDING ONSET, COURSE OF ILLNESS, INPATIENT/OUTPATIENT TREATMENT)
58F w/bipolar I disorder, 2 prior admission, last at [facility] until 09/07/23, no hx of SA or violence, admitted with mood symptoms/psychosis in context of recently stopping lithium due to toxicity.

## 2023-10-23 NOTE — BH INPATIENT PSYCHIATRY PROGRESS NOTE - NSTXPSYCHOPROGRES_PSY_ALL_CORE
Met - goal discontinued

## 2023-10-23 NOTE — BH INPATIENT PSYCHIATRY PROGRESS NOTE - NSBHMSEINSIGHT_PSY_A_CORE
Poor
Fair
Poor
Fair
Poor
Fair
Fair
Poor
Fair
Poor
Poor
Fair
Poor
Fair
Poor
Fair

## 2023-10-23 NOTE — BH INPATIENT PSYCHIATRY PROGRESS NOTE - NSBHASSESSSUMMFT_PSY_ALL_CORE
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 , no known suicide attempt or violence hx, no known legal hx, no known substance abuse history who was brought in by EMS for concerns for suicidal ideation and bipolar decompensation.    Differential Diagnosis: Bipolar Disorder, Bipolar disorder with psychotic features, Schizoaffective disorder    On assessment, patient reported improved mood, with constricted affect, more linear, redirectable,  denied SIIP.     Plan:   -Admit to [facility], 9.39 ahs been converted to a [facility]  -Routine observation q15 checks, No CO needed in a locked, supervised setting, taking meds.   -Psychiatry:   c/w Depakote ER 1000mg at bedtime for Bipolar d/o, c/w Seroquel to 50 mg daily and decreased to 100mg at bedtime for Mood/psychosis, c/w Buproprion XL 150mg daily for depressive episode and c/w Donepezil 10mg at bedtime for cognitive impairment.   diphenhydrAMINE 50 milliGRAM(s) Oral every 6 hours PRN agitation  diphenhydrAMINE Injectable 50 milliGRAM(s) IntraMuscular once PRN agitation  haloperidol     Tablet 2 milliGRAM(s) Oral every 6 hours PRN agitation  haloperidol    Injectable 2 milliGRAM(s) IntraMuscular Once PRN agitation  LORazepam     Tablet 1 milliGRAM(s) Oral every 6 hours PRN Agitation  LORazepam   Injectable 1 milliGRAM(s) IntraMuscular Once PRN Agitation  -Medical: Dry mouth; Biotene ordered   -Group and milieu therapy   -Dispo as per social work

## 2023-10-23 NOTE — BH INPATIENT PSYCHIATRY PROGRESS NOTE - NSTREATMENTCERTY_PSY_ALL_CORE

## 2023-10-23 NOTE — BH INPATIENT PSYCHIATRY PROGRESS NOTE - NSBHMSEAFFQUAL_PSY_A_CORE
Depressed
Euthymic
Euthymic
Depressed
Depressed
Euthymic
Depressed
Euthymic
Depressed
Euthymic
Depressed
Depressed
Euthymic
Depressed

## 2023-10-23 NOTE — BH INPATIENT PSYCHIATRY PROGRESS NOTE - NSBHATTESTATTENDBILLTIME2_PSY_A_CORE
I have reviewed and verified the documentation.

## 2023-10-23 NOTE — BH INPATIENT PSYCHIATRY PROGRESS NOTE - NSBHMETABOLICLABS_PSY_ALL_CORE
Labs within last 12 months

## 2023-10-23 NOTE — BH INPATIENT PSYCHIATRY PROGRESS NOTE - NSBHCHARTREVIEWVS_PSY_A_CORE FT
Vital Signs Last 24 Hrs  T(C): 37 (10-23-23 @ 07:32), Max: 37 (10-23-23 @ 07:32)  T(F): 98.6 (10-23-23 @ 07:32), Max: 98.6 (10-23-23 @ 07:32)  HR: --  BP: --  BP(mean): --  RR: 20 (10-23-23 @ 07:32) (20 - 20)  SpO2: 99% (10-23-23 @ 07:32) (99% - 99%)    Orthostatic VS  10-23-23 @ 07:32  Lying BP: --/-- HR: --  Sitting BP: 129/74 HR: 93  Standing BP: 128/78 HR: 101  Site: --  Mode: --  Orthostatic VS  10-22-23 @ 07:30  Lying BP: --/-- HR: --  Sitting BP: 135/76 HR: 93  Standing BP: 121/74 HR: 108  Site: --  Mode: --

## 2023-10-23 NOTE — BH INPATIENT PSYCHIATRY PROGRESS NOTE - NSBHMSESPEECH_PSY_A_CORE
[facility] [place] Progress Note    Subjective:     YARED Asencio Kitty Valera DO     2021
Normal volume, rate, productivity, spontaneity and articulation
Abnormal as indicated, otherwise normal...
Normal volume, rate, productivity, spontaneity and articulation
Abnormal as indicated, otherwise normal...
Normal volume, rate, productivity, spontaneity and articulation
Abnormal as indicated, otherwise normal...
Normal volume, rate, productivity, spontaneity and articulation

## 2023-10-23 NOTE — BH INPATIENT PSYCHIATRY PROGRESS NOTE - NSBHCONSULTIPREASON_PSY_A_CORE
danger to self; mental illness expected to respond to inpatient care

## 2023-10-23 NOTE — BH INPATIENT PSYCHIATRY PROGRESS NOTE - NSBHMSERELATED_PSY_A_CORE
Poor
Good
Poor
Poor
Good
Poor
Good
Poor
Poor
Good
Poor
Poor
Good

## 2023-10-23 NOTE — BH INPATIENT PSYCHIATRY PROGRESS NOTE - NSBHPSYCHOLCOGABN_PSY_A_CORE
disoriented to time/disoriented to place/disoriented to situation
disoriented to situation
disoriented to time/disoriented to place/disoriented to situation
disoriented to situation
disoriented to time/disoriented to place/disoriented to situation
disoriented to situation
disoriented to situation
disoriented to time/disoriented to place/disoriented to situation
disoriented to time/disoriented to place/disoriented to situation

## 2023-10-23 NOTE — BH INPATIENT PSYCHIATRY PROGRESS NOTE - NSCGISEVERILLNESS_PSY_ALL_CORE
5 = Markedly ill - intrusive symptoms that distinctly impair social/occupational function or cause intrusive levels of distress
5 = Markedly ill - intrusive symptoms that distinctly impair social/occupational function or cause intrusive levels of distress
6 = Severely ill - disruptive pathology, behavior and function are frequently influenced by symptoms, may require assistance from others
5 = Markedly ill - intrusive symptoms that distinctly impair social/occupational function or cause intrusive levels of distress

## 2023-10-23 NOTE — BH INPATIENT PSYCHIATRY PROGRESS NOTE - NSTXSUICIDINTERMD_PSY_ALL_CORE
Med management, group and milieu therapy

## 2023-10-23 NOTE — BH INPATIENT PSYCHIATRY PROGRESS NOTE - OTHER
[place] 
[place] 
[place] 
[place] 
[place] 
[place] 
[place] 
[place] 
[place] 
[place] 
[place] 
[place] 
[place] 
[place] 
[place] 
[place]

## 2023-10-23 NOTE — BH INPATIENT PSYCHIATRY PROGRESS NOTE - NSCGIIMPROVESX_PSY_ALL_CORE
2 = Much improved - notably better with signficant reduction of symptoms; increase in the level of functioning but some symptoms remain
4 = No change - symptoms remain essentially unchanged
2 = Much improved - notably better with signficant reduction of symptoms; increase in the level of functioning but some symptoms remain
2 = Much improved - notably better with signficant reduction of symptoms; increase in the level of functioning but some symptoms remain

## 2023-10-23 NOTE — BH INPATIENT PSYCHIATRY DISCHARGE NOTE - NSDCCPCAREPLAN_GEN_ALL_CORE_FT
PRINCIPAL DISCHARGE DIAGNOSIS  Diagnosis: Bipolar 1 disorder  Assessment and Plan of Treatment:       SECONDARY DISCHARGE DIAGNOSES  Diagnosis: Xerostomia  Assessment and Plan of Treatment:     Diagnosis: Insomnia  Assessment and Plan of Treatment:     Diagnosis: Dementia  Assessment and Plan of Treatment:     Diagnosis: Lithium toxicity  Assessment and Plan of Treatment:

## 2023-10-23 NOTE — BH INPATIENT PSYCHIATRY PROGRESS NOTE - NSBHMSEAFFCONG_PSY_A_CORE
Congruent
Non-congruent
Congruent
Non-congruent
Congruent
Congruent
Non-congruent
Congruent
Congruent
Non-congruent
Congruent
Non-congruent

## 2023-10-23 NOTE — BH INPATIENT PSYCHIATRY PROGRESS NOTE - NSTXPSYCHOINTERMD_PSY_ALL_CORE
Med management, group and milieu therapy
Full range of motion of upper and lower extremities, no joint tenderness/swelling.
Med management, group and milieu therapy

## 2023-10-23 NOTE — BH INPATIENT PSYCHIATRY PROGRESS NOTE - NSBHMSEAFFRANGE_PSY_A_CORE
Blunted/Constricted
Blunted
Constricted
Blunted/Constricted
Blunted
Blunted/Constricted
Blunted
Constricted
Blunted/Constricted
Blunted
Blunted/Constricted
Blunted
Blunted/Constricted
Blunted
Blunted
Blunted/Constricted
Blunted/Constricted
Blunted
Blunted/Constricted
Blunted/Constricted
Blunted
Constricted
Constricted
Blunted
Blunted

## 2023-10-23 NOTE — BH INPATIENT PSYCHIATRY PROGRESS NOTE - NSBHCONTPROVIDER_PSY_ALL_CORE
No...

## 2023-10-23 NOTE — BH INPATIENT PSYCHIATRY PROGRESS NOTE - MSE OPTIONS
Structured MSE

## 2023-10-23 NOTE — BH INPATIENT PSYCHIATRY PROGRESS NOTE - NSBHMSEBODY_PSY_A_CORE
Average build
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
## 2023-10-23 NOTE — BH INPATIENT PSYCHIATRY PROGRESS NOTE - GENERAL APPEARANCE
No deformities present

## 2023-10-23 NOTE — BH INPATIENT PSYCHIATRY PROGRESS NOTE - NSTXPROBPSYCHO_PSY_ALL_CORE
PSYCHOTIC SYMPTOMS

## 2023-10-23 NOTE — BH INPATIENT PSYCHIATRY PROGRESS NOTE - NSBHMSEKNOWHOW_PSY_ALL_CORE
Current Events
Vocabulary
Vocabulary
Current Events
Vocabulary
Current Events
Vocabulary
Current Events
Vocabulary
Current Events
Vocabulary
Vocabulary
Current Events
Vocabulary
Vocabulary
Current Events
Current Events
Vocabulary
Current Events
Current Events
Vocabulary
Current Events
Vocabulary
Current Events
Current Events

## 2023-10-23 NOTE — BH INPATIENT PSYCHIATRY PROGRESS NOTE - NSTXPSYCHOGOAL_PSY_ALL_CORE
Will engage in a 15 minute conversation with no irrational content

## 2023-10-23 NOTE — BH INPATIENT PSYCHIATRY PROGRESS NOTE - NSTXPROBCONF_PSY_ALL_CORE
CONFUSION, ACUTE/CHRONIC

## 2023-10-23 NOTE — BH INPATIENT PSYCHIATRY DISCHARGE NOTE - NSDCRECOMMENDMEDICALFT_PSY_ALL_CORE
Follow up with Neurology appointment with Dr. Aguilar Hauser on Friday, October 27, 2023 at 10 AM.   Location: [address] [place] [address].   Phone: [phone]

## 2023-10-23 NOTE — BH INPATIENT PSYCHIATRY PROGRESS NOTE - NSBHMSEGROOM_PSY_A_CORE
Fair
Good
Fair
Good
Fair
Fair
Good
Fair
Fair
Good
Good
Fair
Good
Good
Fair
Fair
Good
Fair
Fair
Good
Fair
Poor
Fair
Good
Good
Fair

## 2023-10-23 NOTE — BH INPATIENT PSYCHIATRY DISCHARGE NOTE - NSBHASSESSSUMMFT_PSY_ALL_CORE
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 , no known suicide attempt or violence hx, no known legal hx, no known substance abuse history who was brought in by EMS for concerns for suicidal ideation and bipolar decompensation.    Differential Diagnosis: Bipolar Disorder, Bipolar disorder with psychotic features, Schizoaffective disorder    On assessment, patient reported improved mood, with constricted affect, more linear, redirectable,  denied SIIP.     Plan:   -Admit to [facility], 9.39 ahs been converted to a 2[facility]  -Routine observation q15 checks, No CO needed in a locked, supervised setting, taking meds.   -Psychiatry:   c/w Depakote ER 1000mg at bedtime for Bipolar d/o, c/w Seroquel to 50 mg daily and decreased to 100mg at bedtime for Mood/psychosis, c/w Buproprion XL 150mg daily for depressive episode and c/w Donepezil 10mg at bedtime for cognitive impairment.     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 , no known suicide attempt or violence hx, no known legal hx, no known substance abuse history who was brought in by EMS for concerns for suicidal ideation and bipolar decompensation.    Differential Diagnosis: Bipolar Disorder, Bipolar disorder with psychotic features, Schizoaffective disorder    On assessment, patient reported improved mood, more linear, denied SIIP. Patient is not an imminent danger to self/ others and is stable for discharge.     Plan:   -Admit to [facility], 9.39 ahs been converted to a 2[facility]  -Routine observation q15 checks, No CO needed in a locked, supervised setting, taking meds.   -Psychiatry:   c/w Depakote ER 1000mg at bedtime (VPA level 103),  for Bipolar d/o, c/w Seroquel to 50 mg daily and decreased to 100mg at bedtime for Mood/psychosis, c/w Buproprion XL 150mg daily for depressive episode and c/w Donepezil 10mg at bedtime for cognitive impairment.     -Medical: Dry mouth; Biotene ordered   -Group and milieu therapy   -Disposition as per social work

## 2023-10-23 NOTE — BH INPATIENT PSYCHIATRY PROGRESS NOTE - NSBHMSEJUDGE_PSY_A_CORE
Patient arrived to er with with inability to self cath with last time being yesterday evening. Patient then stated he went for a walk around 5 am and stated he felt his balance was off and he was going left and right. Patient has bilateral nephrostomy tubes that were changed on oct 11th on left side to a bigger tube. Patient also having drainage around tube   
Pt self caths at home and is unable to get urine out as usual .   
Fair
Fair
Poor
Fair
Poor
Fair
Poor
Fair
Poor
Fair
Fair
Poor
Poor
Fair

## 2023-10-23 NOTE — BH INPATIENT PSYCHIATRY PROGRESS NOTE - NSTXDCOPNOINTERMD_PSY_ALL_CORE
Med management; group/milieu therapy.

## 2023-10-23 NOTE — BH INPATIENT PSYCHIATRY PROGRESS NOTE - NSTXPSYCHODATEEST_PSY_ALL_CORE
14-Sep-2023

## 2023-10-23 NOTE — BH INPATIENT PSYCHIATRY DISCHARGE NOTE - NSBHFUPINTERVALCCFT_PSY_A_CORE
"When I wake up I'm good. I need to sit and figure out things when I get home. It's a been a long journey."

## 2023-10-23 NOTE — BH INPATIENT PSYCHIATRY DISCHARGE NOTE - NSBHFUPINTERVALHXFT_PSY_A_CORE
F/up for bipolar disorder and dementia. Chart reviewed. Care was discussed with treatment team. Vital signs stable. No acute overnight events. Compliant with medications and did not require PRNs overnight. Patient reported: "When I wake up I'm good. I need to sit and figure out things when I get home. It's a been a long journey." Patient denied suicidal ideation, homicidal ideation, and hallucinations. She denied feelings of depression, hopelessness, and helplessness. Patient endorsed having adequate sleep and good appetite. Patient denied side effects to medication. Patient denied dizziness and constipation. Her last bowel movement was 10/23/23. Patient expressed that she lives with her daughter and that she is looking forward to going home to spend time with her two daughters. She also looks forward to socializing with her friends. Patient verbalized understanding of the importance of complying with medications and was receptive to having a Home Care Aide. Patient reported that she listens to music and meditates as coping skills. Patient is accepting of her daughter administering her medications once she is discharged. Patient is receptive to following up with the Neurologist on Friday, October 27, 2023.    F/up for bipolar disorder and dementia. Chart reviewed. Care was discussed with treatment team. Vital signs stable. No acute overnight events. Compliant with medications and did not require PRNs overnight.  Patient denied suicidal ideation, homicidal ideation, and hallucinations. She denied feelings of depression, hopelessness, and helplessness. Patient endorsed having adequate sleep and good appetite. Patient denied side effects to medication. Patient denied dizziness and constipation. Her last bowel movement was 10/23/23. Patient expressed that she lives with her daughter and that she is looking forward to going home to spend time with her two daughters. She also looks forward to socializing with her friends. Patient verbalized understanding of the importance of complying with medications and was receptive to having a Home Care Aide. Patient reported that she listens to music and meditates as coping skills. Patient is accepting of her daughter administering her medications once she is discharged. Patient is receptive to following up with the Neurologist on Friday, October 27, 2023.

## 2023-10-23 NOTE — BH INPATIENT PSYCHIATRY PROGRESS NOTE - NSBHATTESTTYPEVISIT_PSY_A_CORE
Class I (easy) - visualization of the soft palate, fauces, uvula, and both anterior and posterior pillars
Attending Only
On-site Attending supervising ALEKSEY (99XXX codes)
Attending Only
On-site Attending supervising ALEKSEY (99XXX codes)
ALEKSEY without on-site Attending supervision
On-site Attending supervising ALEKSEY (99XXX codes)
ALEKSEY without on-site Attending supervision
On-site Attending supervising ALEKSEY (99XXX codes)
Attending Only
On-site Attending supervising ALEKSEY (99XXX codes)
Attending Only
On-site Attending supervising ALEKSEY (99XXX codes)

## 2023-10-23 NOTE — BH INPATIENT PSYCHIATRY PROGRESS NOTE - NSTXDCOPNODATEEST_PSY_ALL_CORE
06-Oct-2023
13-Oct-2023
15-Sep-2023
29-Sep-2023
15-Sep-2023
29-Sep-2023
06-Oct-2023
29-Sep-2023
13-Oct-2023
29-Sep-2023
13-Oct-2023
22-Sep-2023
22-Sep-2023
06-Oct-2023
20-Oct-2023
22-Sep-2023
15-Sep-2023
20-Oct-2023
15-Sep-2023
15-Sep-2023
22-Sep-2023
29-Sep-2023
13-Oct-2023
06-Oct-2023
22-Sep-2023
15-Sep-2023
15-Sep-2023
13-Oct-2023

## 2023-10-23 NOTE — BH INPATIENT PSYCHIATRY PROGRESS NOTE - NSTXPROBSUICID_PSY_ALL_CORE
SUICIDE/SELF-INJURIOUS BEHAVIOR

## 2023-10-23 NOTE — BH INPATIENT PSYCHIATRY PROGRESS NOTE - NSTXCONFINTERMD_PSY_ALL_CORE
Med management; group/milieu therapy.
Med management; group/milieu therapy.
Med management; group/milieu therapy.  Neurology has been involved 
Med management; group/milieu therapy.
Med management; group/milieu therapy.
Med management; group/milieu therapy.  Neurology has been involved 
Med management; group/milieu therapy.
Med management; group/milieu therapy.  Neurology has been involved 
Med management; group/milieu therapy.
Med management; group/milieu therapy.  Neurology has been involved 
Med management; group/milieu therapy.
Med management; group/milieu therapy.  Neurology has been involved 
Med management; group/milieu therapy.
Med management; group/milieu therapy.  Neurology has been involved 
Med management; group/milieu therapy.
Med management; group/milieu therapy.  Neurology has been involved 
Med management; group/milieu therapy.
Med management; group/milieu therapy.  Neurology has been involved 

## 2023-10-23 NOTE — BH INPATIENT PSYCHIATRY PROGRESS NOTE - NSBHROSSYSTEMS_PSY_ALL_CORE
Psychiatric
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
## 2023-10-23 NOTE — BH INPATIENT PSYCHIATRY PROGRESS NOTE - NSBHFUPINTERVALCCFT_PSY_A_CORE
"I can't keep things straight" 
reported feeling "good". 
"I am not comfortable ". 
"I had the best sleep of my life"
reported feeling "pretty good". 
reported feeling "pretty good". 
"I can't keep things straight" 
"I think I keep forgetting"
"I"
"I think I am having good day"
"I am doing fine". 
"I felt different Saturday"
"I feel I don't belong"
"Started off bad"
"I' feel people are following me"
reported feeling "pretty good". 
"I"
Patient unable to engage in interview
"I feel clear"
"I don't know how I feel."
"I am doing fine". 
"I had a bad day"
"I am doing fine". 
[place]
"I don't understand why I have to stay here"
"I feel clear and then it all feels like it goes away"
"I am feeling confused"
"I felt a certain way"

## 2023-10-23 NOTE — BH INPATIENT PSYCHIATRY PROGRESS NOTE - NSBHMSEHYG_PSY_A_CORE
Good
Fair
Fair
Good
Fair
Fair
Good
Good
Fair
Good
Good
Fair
Fair
Good
Fair
Fair
Good
Fair
Poor
Good
Fair
Good
Fair
Good

## 2023-10-23 NOTE — BH INPATIENT PSYCHIATRY PROGRESS NOTE - NSBHMSEEYE_PSY_A_CORE
Good
Fair
Good
Fair
Good
Fair
Good
Good
Fair
Fair
Good
Fair
Fair
Good
Poor
Good

## 2023-10-23 NOTE — BH INPATIENT PSYCHIATRY PROGRESS NOTE - NSICDXBHPRIMARYDX_PSY_ALL_CORE
Bipolar 1 disorder   F31.9  

## 2023-10-23 NOTE — BH INPATIENT PSYCHIATRY PROGRESS NOTE - NSTXCONFDATEEST_PSY_ALL_CORE
18-Sep-2023

## 2023-10-23 NOTE — BH INPATIENT PSYCHIATRY DISCHARGE NOTE - NSBHDCHANDOFFFT_PSY_ALL_CORE
handoff emailed Dr. SRINIVAS Arreguin, discussed tx plan/ medications. emailed Dr. Arreguin that she can email BLAYNE Wynn who is the main provider if any questions.

## 2023-10-23 NOTE — BH INPATIENT PSYCHIATRY PROGRESS NOTE - NSBHATTESTATTENDPERFORM_PSY_A_CORE
Medical Decision Making

## 2023-10-23 NOTE — BH INPATIENT PSYCHIATRY PROGRESS NOTE - NSBHMSEATTEN_PSY_A_CORE
Impaired

## 2023-10-23 NOTE — BH INPATIENT PSYCHIATRY PROGRESS NOTE - NSDCCRITERIA_PSY_ALL_CORE
Improved mood, clear and linear thought process, CGI </=3

## 2023-10-23 NOTE — BH INPATIENT PSYCHIATRY PROGRESS NOTE - NSBHCONSBHPROVCNTCTNOFT_PSY_A_CORE
Consent to be obtained

## 2023-10-23 NOTE — BH INPATIENT PSYCHIATRY PROGRESS NOTE - NSBHMSEMOOD_PSY_A_CORE
Normal
Depressed
Normal

## 2023-10-23 NOTE — BH INPATIENT PSYCHIATRY DISCHARGE NOTE - NSBHDCMEDICALFT_PSY_A_CORE
Patient should follow up with PCP to monitor renal function.  Patient should follow up with PCP to monitor renal function.     Neurology consult done on 9/20/23. As per Neurology recommendations, patient should "receive MRI of head without contrast to investigate for demyelination secondary to severe lithium toxicity and avoid re-exposure to lithium if feasible."    MRI Head without contrast done on 9/22/23. Results indicated: "Mild head motion artifact.  No MRI evidence of acute intracranial pathology. There are a few scattered nonspecific punctate foci of T2 FLAIR signal hyperintensity in subcortical white matter of both frontal lobes.  Common etiologies in this age group include postinfectious/postinflammatory lesions, chronic microvascular ischemic disease, migraines and Lyme disease.    Follow-up with Neurology appointment with Dr. Aguilar Hauser on Friday, October 27, 2023 at 10 AM.   Location: [address] [place] [address].   Phone: [phone]

## 2023-10-23 NOTE — BH INPATIENT PSYCHIATRY PROGRESS NOTE - NSTXCONFGOAL_PSY_ALL_CORE
Will ask for assistance as required

## 2023-10-23 NOTE — BH INPATIENT PSYCHIATRY PROGRESS NOTE - NSBHPSYCHOLCOGORIENT_PSY_A_CORE
Oriented to time, place, person, situation
Oriented to time, place, person, situation
Not fully oriented...
Oriented to time, place, person, situation
Not fully oriented...
Oriented to time, place, person, situation
Oriented to time, place, person, situation
Not fully oriented...
Oriented to time, place, person, situation
Not fully oriented...
Oriented to time, place, person, situation
Oriented to time, place, person, situation
Not fully oriented...
Oriented to time, place, person, situation
Not fully oriented...
Oriented to time, place, person, situation
Not fully oriented...
Oriented to time, place, person, situation
Not fully oriented...
Oriented to time, place, person, situation
Not fully oriented...

## 2023-10-23 NOTE — BH INPATIENT PSYCHIATRY PROGRESS NOTE - NSBHCONSDANGERSELF_PSY_A_CORE
suicidal ideation with plan and means/unable to care for self

## 2023-10-23 NOTE — BH INPATIENT PSYCHIATRY PROGRESS NOTE - CURRENT MEDICATION
MEDICATIONS  (STANDING):  Biotene Dry Mouth Oral Rinse 15 milliLiter(s) Swish and Spit three times a day  buPROPion XL (24-Hour) 150 milliGRAM(s) Oral daily  divalproex ER 1000 milliGRAM(s) Oral at bedtime  donepezil 10 milliGRAM(s) Oral at bedtime  melatonin. 3 milliGRAM(s) Oral at bedtime  QUEtiapine 100 milliGRAM(s) Oral at bedtime  QUEtiapine 50 milliGRAM(s) Oral daily    MEDICATIONS  (PRN):  acetaminophen     Tablet .. 650 milliGRAM(s) Oral every 6 hours PRN Temp greater or equal to 38C (100.4F), Mild Pain (1 - 3), Moderate Pain (4 - 6), Severe Pain (7 - 10)  aluminum hydroxide/magnesium hydroxide/simethicone Suspension 30 milliLiter(s) Oral every 6 hours PRN Dyspepsia  magnesium hydroxide Suspension 30 milliLiter(s) Oral daily PRN Constipation  OLANZapine 2.5 milliGRAM(s) Oral every 8 hours PRN Agitation or psychosis  OLANZapine Injectable 2.5 milliGRAM(s) IntraMuscular once PRN Agitation or psychosis   MEDICATIONS  (STANDING):  Biotene Dry Mouth Oral Rinse 15 milliLiter(s) Swish and Spit three times a day  buPROPion XL (24-Hour) 150 milliGRAM(s) Oral daily  divalproex ER 1000 milliGRAM(s) Oral at bedtime  donepezil 10 milliGRAM(s) Oral at bedtime  melatonin. 3 milliGRAM(s) Oral at bedtime  QUEtiapine 50 milliGRAM(s) Oral daily  QUEtiapine 100 milliGRAM(s) Oral at bedtime    MEDICATIONS  (PRN):  acetaminophen     Tablet .. 650 milliGRAM(s) Oral every 6 hours PRN Temp greater or equal to 38C (100.4F), Mild Pain (1 - 3), Moderate Pain (4 - 6), Severe Pain (7 - 10)  aluminum hydroxide/magnesium hydroxide/simethicone Suspension 30 milliLiter(s) Oral every 6 hours PRN Dyspepsia  magnesium hydroxide Suspension 30 milliLiter(s) Oral daily PRN Constipation  OLANZapine 2.5 milliGRAM(s) Oral every 8 hours PRN Agitation or psychosis  OLANZapine Injectable 2.5 milliGRAM(s) IntraMuscular once PRN Agitation or psychosis

## 2023-10-23 NOTE — BH INPATIENT PSYCHIATRY DISCHARGE NOTE - ATTENDING DISCHARGE PHYSICAL EXAMINATION:
Care was discussed and reviewed in interdisciplinary treatment team.  I, Veronika Gonzalez MD, have reviewed and verified the documentation.  I independently performed the documented medical decision making.    Patient was seen and evaluated at bedside by her self. Patient reported that she is feeling "really good" and is looking forward to go back home. Patient stated that she feels safe returning home and she trust her daughter. Patient is aware that she will have increase services at home that will assist with medications, meals and transportation. At the moment patient denies any side effects from the medications and is motivated in continuing her treatment in the outpatient setting. Denies any SI and has been able to contract for safety. Patient denies HI or hallucinations and no delusions have been elicited. During the hospital stay patient has not demonstrated any aggressive or self injurious behaviors and this has been consistent with my observations and the observations of the staff. Safety plan has been reviewed with the patient and daughter; they understand that if at any time she feels like harming her self or harming others she can call 911 or go to any ER.

## 2023-10-23 NOTE — BH INPATIENT PSYCHIATRY PROGRESS NOTE - NSTXSUICIDGOAL_PSY_ALL_CORE
Will identify and utilize 2 coping skills

## 2023-10-23 NOTE — BH INPATIENT PSYCHIATRY PROGRESS NOTE - NSBHATTESTBILLING_PSY_A_CORE
68989-Ehixnkxedg OBS or IP - high complexity OR 50-79 mins
66000-Tlwsjaywoi OBS or IP - moderate complexity OR 35-49 mins
79175-Tgxpkucjuf OBS or IP - moderate complexity OR 35-49 mins
17886-Sfsvgtdojz OBS or IP - high complexity OR 50-79 mins
49810-Yuombogvrd OBS or IP - moderate complexity OR 35-49 mins
87476-Wmmakmujtm OBS or IP - high complexity OR 50-79 mins
21814-Pzaypancny OBS or IP - moderate complexity OR 35-49 mins
50663-Wpqzuaisga OBS or IP - moderate complexity OR 35-49 mins
54613-Cmzoccbqvd OBS or IP - moderate complexity OR 35-49 mins
40148-Pvatugmlxz OBS or IP - moderate complexity OR 35-49 mins
76114-Kecnvslepf OBS or IP - moderate complexity OR 35-49 mins
10112-Ffqvesjrju OBS or IP - moderate complexity OR 35-49 mins
35032-Mctjqlocvy OBS or IP - moderate complexity OR 35-49 mins
54202-Uuazoagkhk OBS or IP - moderate complexity OR 35-49 mins
31875-Rruoecxpto OBS or IP - moderate complexity OR 35-49 mins
59606-Prbybgqong OBS or IP - moderate complexity OR 35-49 mins
67110-Rjiymaegvs OBS or IP - moderate complexity OR 35-49 mins
43056-Vlqvpulgic OBS or IP - moderate complexity OR 35-49 mins
79194-Ajievbjfga OBS or IP - high complexity OR 50-79 mins
18040-Naapijaunx OBS or IP - moderate complexity OR 35-49 mins
98111-Ftmarygwyi OBS or IP - moderate complexity OR 35-49 mins
05803-Jvgijtxvxe OBS or IP - moderate complexity OR 35-49 mins
63980-Bruxpljfuh OBS or IP - high complexity OR 50-79 mins
46257-Gsujvkaxwd OBS or IP - moderate complexity OR 35-49 mins
96441-Siaytutols OBS or IP - moderate complexity OR 35-49 mins
42413-Lbzobaracu OBS or IP - high complexity OR 50-79 mins
62872-Jhtukvrcby OBS or IP - moderate complexity OR 35-49 mins
18022-Qicbvpdbae OBS or IP - moderate complexity OR 35-49 mins

## 2023-10-23 NOTE — BH INPATIENT PSYCHIATRY DISCHARGE NOTE - NSDCMRMEDTOKEN_GEN_ALL_CORE_FT
buPROPion 150 mg/24 hours (XL) oral tablet, extended release: 1 tab(s) orally once a day  divalproex sodium 500 mg oral tablet, extended release: 2 tab(s) orally once a day (at bedtime)  donepezil 10 mg oral tablet: 1 tab(s) orally once a day (at bedtime)  melatonin 3 mg oral tablet: 1 tab(s) orally once a day (at bedtime)  QUEtiapine 100 mg oral tablet: 1 tab(s) orally once a day (at bedtime)  QUEtiapine 50 mg oral tablet: 1 tab(s) orally once a day

## 2023-10-23 NOTE — BH INPATIENT PSYCHIATRY DISCHARGE NOTE - NSBHDCRISKMITIGATE_PSY_ALL_CORE
Safety planning/Referral to case management/Referral to health home/Family/Other social support involvement/Medications targeting suicidality/non-suicidal self injurious behavior

## 2023-10-23 NOTE — BH INPATIENT PSYCHIATRY PROGRESS NOTE - NSBHMSETHTPROC_PSY_A_CORE
Disorganized/Thought blocking/Illogical
Thought blocking/Other
Disorganized/Thought blocking/Illogical
Disorganized/Thought blocking
Other
Thought blocking/Other
Disorganized/Thought blocking/Illogical
Other
Disorganized/Thought blocking/Illogical
Other
Thought blocking/Other
Disorganized/Thought blocking/Illogical
Thought blocking/Other
Thought blocking/Other
Disorganized/Thought blocking/Illogical
Disorganized/Thought blocking/Illogical
Other
Thought blocking/Other
Thought blocking/Other
Disorganized/Thought blocking/Illogical
Thought blocking/Other
Disorganized/Thought blocking/Illogical
Thought blocking/Other
Other
Thought blocking/Other
Thought blocking/Other

## 2023-10-24 VITALS — TEMPERATURE: 98 F

## 2023-10-24 RX ADMIN — BUPROPION HYDROCHLORIDE 150 MILLIGRAM(S): 150 TABLET, EXTENDED RELEASE ORAL at 08:43

## 2023-10-24 RX ADMIN — QUETIAPINE FUMARATE 50 MILLIGRAM(S): 200 TABLET, FILM COATED ORAL at 08:44

## 2023-10-27 ENCOUNTER — APPOINTMENT (OUTPATIENT)
Dept: NEUROLOGY | Facility: CLINIC | Age: 58
End: 2023-10-27
Payer: MEDICAID

## 2023-10-27 VITALS
BODY MASS INDEX: 28.72 KG/M2 | HEIGHT: 67 IN | WEIGHT: 183 LBS | SYSTOLIC BLOOD PRESSURE: 130 MMHG | DIASTOLIC BLOOD PRESSURE: 74 MMHG

## 2023-10-27 DIAGNOSIS — F31.9 BIPOLAR DISORDER, UNSPECIFIED: ICD-10-CM

## 2023-10-27 DIAGNOSIS — G92.9 UNSPECIFIED TOXIC ENCEPHALOPATHY: ICD-10-CM

## 2023-10-27 DIAGNOSIS — Z87.891 PERSONAL HISTORY OF NICOTINE DEPENDENCE: ICD-10-CM

## 2023-10-27 DIAGNOSIS — Z78.9 OTHER SPECIFIED HEALTH STATUS: ICD-10-CM

## 2023-10-27 PROCEDURE — 99214 OFFICE O/P EST MOD 30 MIN: CPT

## 2023-10-27 RX ORDER — DONEPEZIL HYDROCHLORIDE 10 MG/1
10 TABLET ORAL
Refills: 0 | Status: ACTIVE | COMMUNITY

## 2023-10-27 RX ORDER — QUETIAPINE 100 MG/1
100 TABLET, FILM COATED ORAL
Refills: 0 | Status: ACTIVE | COMMUNITY

## 2023-10-27 RX ORDER — QUETIAPINE 50 MG/1
50 TABLET, FILM COATED ORAL
Refills: 0 | Status: ACTIVE | COMMUNITY

## 2023-10-27 RX ORDER — DIVALPROEX SODIUM 500 1/1
500 TABLET, EXTENDED RELEASE ORAL
Refills: 0 | Status: ACTIVE | COMMUNITY

## 2023-10-27 RX ORDER — BUPROPION HYDROCHLORIDE 300 MG/1
300 TABLET, EXTENDED RELEASE ORAL
Refills: 0 | Status: ACTIVE | COMMUNITY

## 2023-11-04 NOTE — BH CONSULTATION LIAISON PROGRESS NOTE - NSBHMSEHYG_PSY_A_CORE
Problem: Adult Inpatient Plan of Care  Goal: Plan of Care Review  Outcome: Ongoing, Progressing  Flowsheets (Taken 11/3/2023 2228)  Plan of Care Reviewed With: patient  Goal: Patient-Specific Goal (Individualized)  Outcome: Ongoing, Progressing  Goal: Absence of Hospital-Acquired Illness or Injury  Outcome: Ongoing, Progressing  Goal: Optimal Comfort and Wellbeing  Outcome: Ongoing, Progressing  Goal: Readiness for Transition of Care  Outcome: Ongoing, Progressing     Problem: Infection  Goal: Absence of Infection Signs and Symptoms  Outcome: Ongoing, Progressing     Problem: Communication Impairment (Mechanical Ventilation, Invasive)  Goal: Effective Communication  Outcome: Ongoing, Progressing     Problem: Device-Related Complication Risk (Mechanical Ventilation, Invasive)  Goal: Optimal Device Function  Outcome: Ongoing, Progressing     Problem: Inability to Wean (Mechanical Ventilation, Invasive)  Goal: Mechanical Ventilation Liberation  Outcome: Ongoing, Progressing     Problem: Nutrition Impairment (Mechanical Ventilation, Invasive)  Goal: Optimal Nutrition Delivery  Outcome: Ongoing, Progressing     Problem: Skin and Tissue Injury (Mechanical Ventilation, Invasive)  Goal: Absence of Device-Related Skin and Tissue Injury  Outcome: Ongoing, Progressing     Problem: Ventilator-Induced Lung Injury (Mechanical Ventilation, Invasive)  Goal: Absence of Ventilator-Induced Lung Injury  Outcome: Ongoing, Progressing     Problem: Communication Impairment (Artificial Airway)  Goal: Effective Communication  Outcome: Ongoing, Progressing     Problem: Device-Related Complication Risk (Artificial Airway)  Goal: Optimal Device Function  Outcome: Ongoing, Progressing     Problem: Skin and Tissue Injury (Artificial Airway)  Goal: Absence of Device-Related Skin or Tissue Injury  Outcome: Ongoing, Progressing     Problem: Fall Injury Risk  Goal: Absence of Fall and Fall-Related Injury  Outcome: Ongoing, Progressing     Problem: 
Skin Injury Risk Increased  Goal: Skin Health and Integrity  Outcome: Ongoing, Progressing     
Fair

## 2023-11-06 NOTE — BH PATIENT CHARACTERISTICS SURVEY - NSPCSPRIVATERES_PSY_ALL_CORE
Client’s child, stepchild, foster child, grandchild/Client’s spouse or domestic partner/Other relatives of client not specified above

## 2023-11-21 NOTE — PROGRESS NOTE ADULT - SUBJECTIVE AND OBJECTIVE BOX
----- Message from Reymundo Valderrama sent at 11/21/2023  2:05 PM CST -----  Regarding: Cpap  Contact: [phone]  I still have not received my Cpap machine. The vendor claims they have made three requests to your office for some documents to be signed in order for me to receive the machine. I just had shoulder surgery last week and have had even more of an issue sleeping. It is becoming a major problem. Can you prescribe sleep meds in the meantime? 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     Mitchell    [facility] Division of Kidney Diseases & Hypertension  FOLLOW UP NOTE  [phone]--------------------------------------------------------------------------------    HPI: 59 yo F with h/o bipolar disorder (on lithium) presenting with lethargy for 1 week with nausea, vomiting, diarrhea, and decreased PO intake. Nephrology was consulted for ALICJA and elevated lithium level. Lithium level was elevated to 5.3 on admission (7/25).    24 hour events/subjective: Pt. was seen and evaluated in the MICU this morning. Pt. with AMS, non-conversive. Unable to obtain ROS. Pt. had 3x HD sessions lasting 2 hours, 40 minutes, and 6 hours. 2nd treatment had abbreviated course due to hypotension.    PAST HISTORY  --------------------------------------------------------------------------------  No significant changes to PMH, PSH, FHx, SHx, unless otherwise noted    ALLERGIES & MEDICATIONS  --------------------------------------------------------------------------------  Allergies  penicillins (Unknown)  amoxicillin (Unknown)  penicillin (Hives)    Intolerances    Standing Inpatient Medications  cefTRIAXone   IVPB 1000 milliGRAM(s) IV Intermittent every 24 hours  chlorhexidine 2% Cloths 1 Application(s) Topical daily  dexMEDEtomidine Infusion 0.2 MICROgram(s)/kG/Hr IV Continuous <Continuous>  heparin   Injectable 5000 Unit(s) SubCutaneous every 8 hours  norepinephrine Infusion 0.05 MICROgram(s)/kG/Min IV Continuous <Continuous>  sodium chloride 0.9%. 1000 milliLiter(s) IV Continuous <Continuous>    PRN Inpatient Medications  haloperidol    Injectable 2.5 milliGRAM(s) IntraMuscular every 6 hours PRN    REVIEW OF SYSTEMS  --------------------------------------------------------------------------------  Unable to obtain ROS due to clinical status.    VITALS/PHYSICAL EXAM  --------------------------------------------------------------------------------  T(C): 35.7 (07-27-23 @ 12:00), Max: 36.3 (07-27-23 @ 00:00)  HR: 73 (07-27-23 @ 12:00) (62 - 102)  BP: 148/76 (07-27-23 @ 12:00) (82/52 - 159/79)  RR: 19 (07-27-23 @ 12:00) (11 - 26)  SpO2: 100% (07-27-23 @ 12:00) (88% - 100%)  Wt(kg): --  Height (cm): 175.3 (07-26-23 @ 01:34)  Weight (kg): 77.3 (07-26-23 @ 01:34)  BMI (kg/m2): 25.2 (07-26-23 @ 01:34)  BSA (m2): 1.93 (07-26-23 @ 01:34)    07-26-23 @ 07:01  -  07-27-23 @ 07:00  --------------------------------------------------------  IN: 6710.1 mL / OUT: 2353 mL / NET: 4357.1 mL    07-27-23 @ 07:01  -  07-27-23 @ 12:58  --------------------------------------------------------  IN: 1127.1 mL / OUT: 1320 mL / NET: -192.9 mL    Physical Exam:  Gen: NAD, well-appearing  HEENT: NCAT, dry oral mucosa  Pulm: CTA B/L  CV: S1S2, tachycardic  Abd: +BS, soft, nontender/nondistended  : No suprapubic tenderness, sierra  Extremities: no bilateral LE edema noted.   Skin: Warm, without rashes    LABS/STUDIES  --------------------------------------------------------------------------------              10.0   14.91 >-----------<  150      [07-27-23 @ 05:46]              30.6     144  |  107  |  10  ----------------------------<  118      [07-27-23 @ 09:58]  4.0   |  25  |  1.01        Ca     8.0     [07-27-23 @ 09:58]      iCa    0.97     [07-27 @ 04:40]      Mg     2.70     [07-27-23 @ 09:58]      Phos  2.5     [07-27-23 @ 09:58]    TPro  5.0  /  Alb  2.7  /  TBili  0.3  /  DBili  x   /  AST  15  /  ALT  12  /  AlkPhos  69  [07-27-23 @ 04:40]    PT/INR: PT 12.3 , INR 1.10       [07-25-23 @ 18:00]  PTT: 22.1       [07-25-23 @ 18:00]    Creatinine Trend:  SCr 1.01 [07-27 @ 09:58]  SCr 0.85 [07-27 @ 08:42]  SCr 0.72 [07-27 @ 04:40]  SCr 2.16 [07-26 @ 17:10]  SCr 2.09 [07-26 @ 13:14]    Urinalysis - [07-27-23 @ 09:58]      Color  / Appearance  / SG  / pH       Gluc 118 / Ketone   / Bili  / Urobili        Blood  / Protein  / Leuk Est  / Nitrite       RBC  / WBC  / Hyaline  / Gran  / Sq Epi  / Non Sq Epi  / Bacteria     TSH 0.48      [07-25-23 @ 17:53]  HBsAb 17.2      [07-26-23 @ 05:30]  HBsAg Nonreact      [07-26-23 @ 05:30]  HBcAb Nonreact      [07-26-23 @ 05:30]

## 2023-11-22 NOTE — BH PATIENT PROFILE - FUNCTIONAL ASSESSMENT - BASIC MOBILITY 3.
Called and spoke with patient. Surgery is scheduled 1/16/24 @ [facility] w [facility]   Paperwork mailed to patient. 4 = No assist / stand by assistance

## 2024-03-07 NOTE — BH INPATIENT PSYCHIATRY ASSESSMENT NOTE - NSSUICPROTFACT_PSY_ALL_CORE
Yes Identifies reasons for living/Supportive social network of family or friends/Engaged in work or school

## 2024-12-05 NOTE — BH INPATIENT PSYCHIATRY PROGRESS NOTE - NSTXDCOTHRGOAL_PSY_ALL_CORE
found.    Endoscopic investigations:     Assessmentand Plan:  58 y.o. female with history of dyspepsia and postprandial abdominal discomfort.  Has been on omeprazole for GERD.  Patient has history of diabetes and has been under control.  Rule out gastritis or possible gastroparesis.  Previous EGD showed hiatal hernia and gastric polyps will schedule EGD, may need gastric emptying scan   Diagnosis Orders   1. Dyspepsia  EGD        No follow-ups on file.    Kalani Johnson MD   Staff Gastroenterologist  [facility]    Please note this report has been partially produced using speech recognition software and may cause contain errors related to thatsystem including grammar, punctuation and spelling as well as words and phrases that may seem inappropriate. If there are questions or concerns please feel free to contact me to clarify.        
Lacking care coordination.
